# Patient Record
Sex: MALE | Race: WHITE | Employment: OTHER | URBAN - NONMETROPOLITAN AREA
[De-identification: names, ages, dates, MRNs, and addresses within clinical notes are randomized per-mention and may not be internally consistent; named-entity substitution may affect disease eponyms.]

---

## 2017-01-08 ENCOUNTER — OFFICE VISIT (OUTPATIENT)
Dept: URGENT CARE | Age: 32
End: 2017-01-08
Payer: MEDICAID

## 2017-01-08 VITALS
BODY MASS INDEX: 23.8 KG/M2 | HEART RATE: 82 BPM | SYSTOLIC BLOOD PRESSURE: 136 MMHG | TEMPERATURE: 98.9 F | DIASTOLIC BLOOD PRESSURE: 88 MMHG | WEIGHT: 78 LBS | OXYGEN SATURATION: 98 % | RESPIRATION RATE: 16 BRPM

## 2017-01-08 DIAGNOSIS — R19.5 ORANGE STOOL: ICD-10-CM

## 2017-01-08 DIAGNOSIS — R10.84 GENERALIZED ABDOMINAL CRAMPING: ICD-10-CM

## 2017-01-08 DIAGNOSIS — R19.5 STOOL MUCUS: Primary | ICD-10-CM

## 2017-01-08 PROCEDURE — 99213 OFFICE O/P EST LOW 20 MIN: CPT | Performed by: NURSE PRACTITIONER

## 2017-01-08 ASSESSMENT — ENCOUNTER SYMPTOMS
CONSTIPATION: 0
ALLERGIC/IMMUNOLOGIC NEGATIVE: 1
DIARRHEA: 0
RECTAL PAIN: 0
ABDOMINAL PAIN: 1
ANAL BLEEDING: 0
BLOOD IN STOOL: 0
EYES NEGATIVE: 1
VOMITING: 0
RESPIRATORY NEGATIVE: 1
NAUSEA: 0

## 2017-01-11 ENCOUNTER — HOSPITAL ENCOUNTER (OUTPATIENT)
Dept: LAB | Age: 32
Setting detail: SPECIMEN
Discharge: HOME | End: 2017-01-11
Payer: MEDICAID

## 2017-01-14 LAB
CULTURE, STOOL: NORMAL
E COLI SHIGA TOXIN ASSAY: NORMAL

## 2017-03-23 ENCOUNTER — OFFICE VISIT (OUTPATIENT)
Dept: PRIMARY CARE CLINIC | Age: 32
End: 2017-03-23
Payer: MEDICAID

## 2017-03-23 VITALS
SYSTOLIC BLOOD PRESSURE: 116 MMHG | DIASTOLIC BLOOD PRESSURE: 64 MMHG | HEART RATE: 100 BPM | OXYGEN SATURATION: 98 % | BODY MASS INDEX: 23.8 KG/M2 | WEIGHT: 78 LBS

## 2017-03-23 DIAGNOSIS — I10 ESSENTIAL HYPERTENSION: ICD-10-CM

## 2017-03-23 DIAGNOSIS — Q05.9 SPINA BIFIDA, UNSPECIFIED HYDROCEPHALUS PRESENCE, UNSPECIFIED SPINAL REGION (HCC): Primary | ICD-10-CM

## 2017-03-23 PROCEDURE — 99213 OFFICE O/P EST LOW 20 MIN: CPT | Performed by: FAMILY MEDICINE

## 2017-03-23 RX ORDER — POTASSIUM CITRATE 5 MEQ/1
TABLET, EXTENDED RELEASE ORAL
Qty: 180 TABLET | Refills: 0 | Status: SHIPPED | OUTPATIENT
Start: 2017-03-23 | End: 2017-05-17 | Stop reason: SDUPTHER

## 2017-03-23 ASSESSMENT — ENCOUNTER SYMPTOMS
SHORTNESS OF BREATH: 0
COUGH: 0

## 2017-04-04 ENCOUNTER — OFFICE VISIT (OUTPATIENT)
Dept: URGENT CARE | Age: 32
End: 2017-04-04
Payer: MEDICAID

## 2017-04-04 VITALS
HEART RATE: 100 BPM | WEIGHT: 78 LBS | RESPIRATION RATE: 20 BRPM | BODY MASS INDEX: 23.8 KG/M2 | DIASTOLIC BLOOD PRESSURE: 89 MMHG | OXYGEN SATURATION: 97 % | SYSTOLIC BLOOD PRESSURE: 129 MMHG | TEMPERATURE: 98.9 F

## 2017-04-04 DIAGNOSIS — M43.6 NECK STIFFNESS: ICD-10-CM

## 2017-04-04 DIAGNOSIS — T14.8XXA MUSCLE STRAIN: Primary | ICD-10-CM

## 2017-04-04 PROCEDURE — 99213 OFFICE O/P EST LOW 20 MIN: CPT | Performed by: NURSE PRACTITIONER

## 2017-04-04 RX ORDER — CYCLOBENZAPRINE HCL 5 MG
5 TABLET ORAL EVERY 8 HOURS PRN
Qty: 15 TABLET | Refills: 0 | Status: SHIPPED | OUTPATIENT
Start: 2017-04-04 | End: 2017-04-09

## 2017-04-04 RX ORDER — MELOXICAM 7.5 MG/1
7.5 TABLET ORAL DAILY
Qty: 10 TABLET | Refills: 0 | Status: SHIPPED | OUTPATIENT
Start: 2017-04-04 | End: 2017-05-23 | Stop reason: ALTCHOICE

## 2017-04-05 ENCOUNTER — TELEPHONE (OUTPATIENT)
Dept: PRIMARY CARE CLINIC | Age: 32
End: 2017-04-05

## 2017-05-03 DIAGNOSIS — R73.01 IMPAIRED FASTING GLUCOSE: Primary | ICD-10-CM

## 2017-05-05 DIAGNOSIS — R73.01 IMPAIRED FASTING GLUCOSE: ICD-10-CM

## 2017-05-05 DIAGNOSIS — I10 ESSENTIAL HYPERTENSION: ICD-10-CM

## 2017-05-05 LAB
ANION GAP SERPL CALCULATED.3IONS-SCNC: 19 MMOL/L (ref 7–19)
BUN BLDV-MCNC: 15 MG/DL (ref 6–20)
CALCIUM SERPL-MCNC: 9.7 MG/DL (ref 8.6–10)
CHLORIDE BLD-SCNC: 96 MMOL/L (ref 98–111)
CHOLESTEROL, TOTAL: 168 MG/DL (ref 160–199)
CO2: 24 MMOL/L (ref 22–29)
CREAT SERPL-MCNC: 0.5 MG/DL (ref 0.5–1.2)
GFR NON-AFRICAN AMERICAN: >60
GLUCOSE BLD-MCNC: 76 MG/DL (ref 74–109)
HBA1C MFR BLD: 5.7 %
HDLC SERPL-MCNC: 43 MG/DL (ref 55–121)
LDL CHOLESTEROL CALCULATED: 113 MG/DL
POTASSIUM SERPL-SCNC: 4.6 MMOL/L (ref 3.5–5)
SODIUM BLD-SCNC: 139 MMOL/L (ref 136–145)
TRIGL SERPL-MCNC: 59 MG/DL (ref 150–199)

## 2017-05-08 ENCOUNTER — TELEPHONE (OUTPATIENT)
Dept: PRIMARY CARE CLINIC | Age: 32
End: 2017-05-08

## 2017-05-15 ENCOUNTER — HOSPITAL ENCOUNTER (OUTPATIENT)
Dept: WOUND CARE | Age: 32
Discharge: HOME | End: 2017-05-15
Payer: MEDICAID

## 2017-05-15 VITALS
HEART RATE: 100 BPM | DIASTOLIC BLOOD PRESSURE: 75 MMHG | TEMPERATURE: 98.6 F | SYSTOLIC BLOOD PRESSURE: 107 MMHG | RESPIRATION RATE: 16 BRPM

## 2017-05-15 PROCEDURE — 99212 OFFICE O/P EST SF 10 MIN: CPT | Performed by: NURSE PRACTITIONER

## 2017-05-15 PROCEDURE — 99213 OFFICE O/P EST LOW 20 MIN: CPT

## 2017-05-17 DIAGNOSIS — I10 ESSENTIAL HYPERTENSION: ICD-10-CM

## 2017-05-19 ENCOUNTER — HOSPITAL ENCOUNTER (EMERGENCY)
Age: 32
Discharge: HOME | End: 2017-05-19
Attending: EMERGENCY MEDICINE
Payer: MEDICAID

## 2017-05-19 ENCOUNTER — HOSPITAL ENCOUNTER (OUTPATIENT)
Age: 32
Setting detail: SPECIMEN
Discharge: HOME | End: 2017-05-19
Payer: MEDICAID

## 2017-05-19 VITALS
OXYGEN SATURATION: 97 % | DIASTOLIC BLOOD PRESSURE: 91 MMHG | RESPIRATION RATE: 16 BRPM | WEIGHT: 78 LBS | BODY MASS INDEX: 23.8 KG/M2 | SYSTOLIC BLOOD PRESSURE: 126 MMHG | HEART RATE: 101 BPM | TEMPERATURE: 98.1 F

## 2017-05-19 DIAGNOSIS — Q64.70 ABNORMALITY OF URETHRAL MEATUS: Primary | ICD-10-CM

## 2017-05-19 PROCEDURE — 99281 EMR DPT VST MAYX REQ PHY/QHP: CPT | Performed by: EMERGENCY MEDICINE

## 2017-05-19 PROCEDURE — 99282 EMERGENCY DEPT VISIT SF MDM: CPT

## 2017-05-19 RX ORDER — POTASSIUM CITRATE 5 MEQ/1
TABLET, EXTENDED RELEASE ORAL
Qty: 180 TABLET | Refills: 5 | Status: SHIPPED | OUTPATIENT
Start: 2017-05-19 | End: 2017-09-21 | Stop reason: SDUPTHER

## 2017-05-19 ASSESSMENT — ENCOUNTER SYMPTOMS
ABDOMINAL PAIN: 0
NAUSEA: 0
VOMITING: 0

## 2017-05-21 LAB — URINE CULTURE, ROUTINE: NORMAL

## 2017-05-23 ENCOUNTER — HOSPITAL ENCOUNTER (OUTPATIENT)
Dept: WOUND CARE | Age: 32
Discharge: HOME | End: 2017-05-23
Payer: MEDICAID

## 2017-05-23 VITALS
TEMPERATURE: 98.4 F | HEART RATE: 126 BPM | RESPIRATION RATE: 16 BRPM | SYSTOLIC BLOOD PRESSURE: 124 MMHG | DIASTOLIC BLOOD PRESSURE: 84 MMHG | BODY MASS INDEX: 15.31 KG/M2 | WEIGHT: 78 LBS | HEIGHT: 60 IN

## 2017-05-23 PROCEDURE — 99213 OFFICE O/P EST LOW 20 MIN: CPT

## 2017-05-23 PROCEDURE — 99213 OFFICE O/P EST LOW 20 MIN: CPT | Performed by: SURGERY

## 2017-05-25 ENCOUNTER — OFFICE VISIT (OUTPATIENT)
Dept: UROLOGY | Age: 32
End: 2017-05-25
Payer: MEDICAID

## 2017-05-25 VITALS
TEMPERATURE: 98.5 F | OXYGEN SATURATION: 97 % | HEART RATE: 93 BPM | SYSTOLIC BLOOD PRESSURE: 100 MMHG | WEIGHT: 75 LBS | BODY MASS INDEX: 22.89 KG/M2 | DIASTOLIC BLOOD PRESSURE: 60 MMHG

## 2017-05-25 DIAGNOSIS — N31.9 NEUROGENIC BLADDER: ICD-10-CM

## 2017-05-25 DIAGNOSIS — N48.5 ULCER OF PENIS: Primary | ICD-10-CM

## 2017-05-25 PROCEDURE — 99214 OFFICE O/P EST MOD 30 MIN: CPT | Performed by: PHYSICIAN ASSISTANT

## 2017-05-25 ASSESSMENT — ENCOUNTER SYMPTOMS
BLURRED VISION: 0
HEMOPTYSIS: 0
DOUBLE VISION: 0
NAUSEA: 0
COUGH: 0
VOMITING: 0
ABDOMINAL PAIN: 0
BACK PAIN: 0

## 2017-06-01 ENCOUNTER — HOSPITAL ENCOUNTER (OUTPATIENT)
Dept: WOUND CARE | Age: 32
Discharge: HOME | End: 2017-06-01
Payer: MEDICAID

## 2017-06-01 VITALS
HEIGHT: 60 IN | DIASTOLIC BLOOD PRESSURE: 66 MMHG | SYSTOLIC BLOOD PRESSURE: 96 MMHG | RESPIRATION RATE: 16 BRPM | WEIGHT: 77 LBS | HEART RATE: 82 BPM | TEMPERATURE: 98.8 F | BODY MASS INDEX: 15.12 KG/M2

## 2017-06-01 PROCEDURE — 99213 OFFICE O/P EST LOW 20 MIN: CPT

## 2017-06-01 PROCEDURE — 99213 OFFICE O/P EST LOW 20 MIN: CPT | Performed by: SURGERY

## 2017-06-23 ENCOUNTER — OFFICE VISIT (OUTPATIENT)
Dept: URGENT CARE | Age: 32
End: 2017-06-23
Payer: MEDICAID

## 2017-06-23 VITALS
WEIGHT: 77 LBS | HEART RATE: 102 BPM | RESPIRATION RATE: 20 BRPM | BODY MASS INDEX: 23.5 KG/M2 | OXYGEN SATURATION: 96 % | SYSTOLIC BLOOD PRESSURE: 130 MMHG | DIASTOLIC BLOOD PRESSURE: 94 MMHG | TEMPERATURE: 99.3 F

## 2017-06-23 DIAGNOSIS — J02.0 STREP PHARYNGITIS: Primary | ICD-10-CM

## 2017-06-23 DIAGNOSIS — J02.9 SORE THROAT: ICD-10-CM

## 2017-06-23 LAB — S PYO AG THROAT QL: POSITIVE

## 2017-06-23 PROCEDURE — 99213 OFFICE O/P EST LOW 20 MIN: CPT | Performed by: PHYSICIAN ASSISTANT

## 2017-06-23 PROCEDURE — 87880 STREP A ASSAY W/OPTIC: CPT | Performed by: PHYSICIAN ASSISTANT

## 2017-06-23 RX ORDER — AMOXICILLIN 400 MG/5ML
800 POWDER, FOR SUSPENSION ORAL 2 TIMES DAILY
Qty: 200 ML | Refills: 0 | Status: SHIPPED | OUTPATIENT
Start: 2017-06-23 | End: 2017-07-03

## 2017-06-23 ASSESSMENT — ENCOUNTER SYMPTOMS
RHINORRHEA: 1
COUGH: 1
NAUSEA: 0
DIARRHEA: 0
VOMITING: 0
ABDOMINAL PAIN: 0
SORE THROAT: 1

## 2017-07-10 DIAGNOSIS — Q05.9 SPINA BIFIDA, UNSPECIFIED HYDROCEPHALUS PRESENCE, UNSPECIFIED SPINAL REGION (HCC): ICD-10-CM

## 2017-07-13 DIAGNOSIS — Q05.9 SPINA BIFIDA, UNSPECIFIED HYDROCEPHALUS PRESENCE, UNSPECIFIED SPINAL REGION (HCC): ICD-10-CM

## 2017-09-08 DIAGNOSIS — Q05.9 SPINA BIFIDA, UNSPECIFIED HYDROCEPHALUS PRESENCE, UNSPECIFIED SPINAL REGION (HCC): ICD-10-CM

## 2017-09-19 ENCOUNTER — TELEPHONE (OUTPATIENT)
Dept: PRIMARY CARE CLINIC | Age: 32
End: 2017-09-19

## 2017-09-21 ENCOUNTER — OFFICE VISIT (OUTPATIENT)
Dept: PRIMARY CARE CLINIC | Age: 32
End: 2017-09-21
Payer: MEDICAID

## 2017-09-21 VITALS
OXYGEN SATURATION: 97 % | DIASTOLIC BLOOD PRESSURE: 68 MMHG | SYSTOLIC BLOOD PRESSURE: 116 MMHG | HEIGHT: 60 IN | WEIGHT: 78 LBS | BODY MASS INDEX: 15.31 KG/M2 | TEMPERATURE: 98 F | HEART RATE: 103 BPM

## 2017-09-21 DIAGNOSIS — F41.0 PANIC ATTACKS: ICD-10-CM

## 2017-09-21 DIAGNOSIS — I10 ESSENTIAL HYPERTENSION: Primary | ICD-10-CM

## 2017-09-21 DIAGNOSIS — Q05.9 SPINA BIFIDA, UNSPECIFIED HYDROCEPHALUS PRESENCE, UNSPECIFIED SPINAL REGION (HCC): ICD-10-CM

## 2017-09-21 PROCEDURE — 99213 OFFICE O/P EST LOW 20 MIN: CPT | Performed by: FAMILY MEDICINE

## 2017-09-21 RX ORDER — LISINOPRIL 5 MG/1
TABLET ORAL
Qty: 30 TABLET | Refills: 11 | Status: SHIPPED | OUTPATIENT
Start: 2017-09-21 | End: 2018-09-25 | Stop reason: SDUPTHER

## 2017-09-21 RX ORDER — POTASSIUM CITRATE 5 MEQ/1
TABLET, EXTENDED RELEASE ORAL
Qty: 180 TABLET | Refills: 5 | Status: SHIPPED | OUTPATIENT
Start: 2017-09-21 | End: 2018-10-19 | Stop reason: SDUPTHER

## 2017-09-21 ASSESSMENT — ENCOUNTER SYMPTOMS
COUGH: 0
SHORTNESS OF BREATH: 0

## 2017-09-25 ENCOUNTER — TELEPHONE (OUTPATIENT)
Dept: URGENT CARE | Age: 32
End: 2017-09-25

## 2017-09-25 ENCOUNTER — OFFICE VISIT (OUTPATIENT)
Dept: URGENT CARE | Age: 32
End: 2017-09-25
Payer: MEDICAID

## 2017-09-25 VITALS
BODY MASS INDEX: 23.8 KG/M2 | DIASTOLIC BLOOD PRESSURE: 85 MMHG | HEART RATE: 109 BPM | SYSTOLIC BLOOD PRESSURE: 120 MMHG | TEMPERATURE: 98.9 F | RESPIRATION RATE: 18 BRPM | WEIGHT: 78 LBS | OXYGEN SATURATION: 97 %

## 2017-09-25 DIAGNOSIS — R10.84 GENERALIZED ABDOMINAL PAIN: ICD-10-CM

## 2017-09-25 DIAGNOSIS — R82.90 CLOUDY URINE: ICD-10-CM

## 2017-09-25 DIAGNOSIS — N30.01 ACUTE CYSTITIS WITH HEMATURIA: Primary | ICD-10-CM

## 2017-09-25 DIAGNOSIS — R31.9 HEMATURIA: ICD-10-CM

## 2017-09-25 DIAGNOSIS — R10.9 RIGHT FLANK PAIN: ICD-10-CM

## 2017-09-25 LAB
APPEARANCE FLUID: CLEAR
BILIRUBIN, POC: ABNORMAL
BLOOD URINE, POC: ABNORMAL
CLARITY, POC: CLEAR
COLOR, POC: YELLOW
GLUCOSE URINE, POC: ABNORMAL
KETONES, POC: ABNORMAL
LEUKOCYTE EST, POC: ABNORMAL
NITRITE, POC: ABNORMAL
PH, POC: 7
PROTEIN, POC: 100
SPECIFIC GRAVITY, POC: 1.01
UROBILINOGEN, POC: 2

## 2017-09-25 PROCEDURE — 99213 OFFICE O/P EST LOW 20 MIN: CPT | Performed by: NURSE PRACTITIONER

## 2017-09-25 RX ORDER — SULFAMETHOXAZOLE AND TRIMETHOPRIM 200; 40 MG/5ML; MG/5ML
160 SUSPENSION ORAL 2 TIMES DAILY
Qty: 400 ML | Refills: 0 | Status: SHIPPED | OUTPATIENT
Start: 2017-09-25 | End: 2018-07-26 | Stop reason: SDUPTHER

## 2017-09-25 ASSESSMENT — ENCOUNTER SYMPTOMS
VOMITING: 0
DIARRHEA: 0
ABDOMINAL PAIN: 1
NAUSEA: 1
RESPIRATORY NEGATIVE: 1

## 2017-09-26 ENCOUNTER — TELEPHONE (OUTPATIENT)
Dept: UROLOGY | Age: 32
End: 2017-09-26

## 2017-09-26 ENCOUNTER — HOSPITAL ENCOUNTER (OUTPATIENT)
Dept: CT IMAGING | Age: 32
Discharge: HOME | End: 2017-09-26
Payer: MEDICAID

## 2017-09-26 DIAGNOSIS — R10.9 RIGHT FLANK PAIN: ICD-10-CM

## 2017-09-26 DIAGNOSIS — N20.0 KIDNEY STONE: Primary | ICD-10-CM

## 2017-09-26 DIAGNOSIS — R31.9 HEMATURIA: ICD-10-CM

## 2017-09-26 PROCEDURE — 74176 CT ABD & PELVIS W/O CONTRAST: CPT

## 2017-09-27 LAB
ORGANISM: ABNORMAL
URINE CULTURE, ROUTINE: ABNORMAL
URINE CULTURE, ROUTINE: ABNORMAL

## 2017-12-28 ENCOUNTER — OFFICE VISIT (OUTPATIENT)
Dept: URGENT CARE | Age: 32
End: 2017-12-28
Payer: MEDICAID

## 2017-12-28 VITALS
RESPIRATION RATE: 18 BRPM | OXYGEN SATURATION: 97 % | WEIGHT: 74 LBS | BODY MASS INDEX: 14.53 KG/M2 | SYSTOLIC BLOOD PRESSURE: 135 MMHG | HEIGHT: 60 IN | TEMPERATURE: 99.1 F | HEART RATE: 111 BPM | DIASTOLIC BLOOD PRESSURE: 95 MMHG

## 2017-12-28 DIAGNOSIS — J02.9 SORE THROAT: Primary | ICD-10-CM

## 2017-12-28 LAB — S PYO AG THROAT QL: NORMAL

## 2017-12-28 PROCEDURE — 87880 STREP A ASSAY W/OPTIC: CPT | Performed by: NURSE PRACTITIONER

## 2017-12-28 PROCEDURE — 99213 OFFICE O/P EST LOW 20 MIN: CPT | Performed by: NURSE PRACTITIONER

## 2017-12-28 ASSESSMENT — ENCOUNTER SYMPTOMS
SINUS PRESSURE: 0
ALLERGIC/IMMUNOLOGIC NEGATIVE: 1
GASTROINTESTINAL NEGATIVE: 1
VOICE CHANGE: 0
RHINORRHEA: 0
EYES NEGATIVE: 1
TROUBLE SWALLOWING: 0
SORE THROAT: 1
COUGH: 0
SHORTNESS OF BREATH: 0

## 2017-12-29 NOTE — PROGRESS NOTES
[facility] E CARE  [facility]  [address] [place] [id number]  Dept: [phone]  Loc: [phone]    Apurva Becerril is a 28 y.o. male who presents today for his medical conditions/complaints as noted below. Apurva Becerril is c/o of Pharyngitis and Fatigue        HPI:     HPIPt presents to clinic with c/o dry scratchy throat that started yesterday. Denies fever, SOB, chills, n/v/d or any other symptoms. Mom states that pt's BP has been running a little high and it happens when he gets nervous and stressed. States he is on 5 mg BP med. States he has appt with PCP in March. Pt presents to clinic in wheelchair. Results for orders placed or performed in visit on 12/28/17   POCT rapid strep A   Result Value Ref Range    Strep A Ag None Detected None Detected       POCT strep is negative. No results found for this visit on 12/28/17. Past Medical History:   Diagnosis Date    Anxiety     Chronic kidney disease     kidney stones started at age 5, treated at [facility].   Has had lithotripsy and surgery to remove stones    Club Foot     Congenital paraplegia ([address]     Hypertension     diagnosed at age 6    Leg fracture, left     while in body cast due to swelling    Myelomeningocele with hydrocephalus ([address] 1985    Open spina bifida at birth   [facility] Neuropathy ([address]     Pressure sore on ankle     and knee     Scoliosis     Spastic neurogenic bladder     cather x 5 a day      (ventriculoperitoneal) shunt status       Past Surgical History:   Procedure Laterality Date    HERNIA REPAIR      groin    HIP SURGERY Left     LITHOTRIPSY      VENTRICULOPERITONEAL SHUNT      with revisions        Family History   Problem Relation Age of Onset    Colon Cancer Mother 54     In remission    High Cholesterol Mother     High Blood Pressure Mother     Thyroid Disease Mother      Hypothyroidism    Coronary Art Dis Maternal Grandmother CABG x 2    Heart Attack Maternal Grandmother     High Blood Pressure Maternal Grandmother     Cancer Maternal Grandmother      Pancreatic Cancer    Heart Failure Maternal Grandfather       in     Immunodeficiency Neg Hx        Social History   Substance Use Topics    Smoking status: Never Smoker    Smokeless tobacco: Never Used    Alcohol use No      Current Outpatient Prescriptions   Medication Sig Dispense Refill    menthol-zinc oxide (CALMOSEPTINE) 0.44-20.6 % OINT ointment Apply topically daily Max 30 ml per day.  potassium citrate (UROCIT-K) 5 MEQ (540 MG) extended release tablet Take 2 tablets 3 times a day with meals 180 tablet 5    lisinopril (PRINIVIL;ZESTRIL) 5 MG tablet TAKE (1) TABLET DAILY FOR BLOOD PRESSURE. 30 tablet 11    Catheters MISC CATHETER 5 TIMES DAILY IN & OUT *Q05.9* 150 each 5    ALPRAZolam (XANAX) 0.5 MG tablet Take 0.5 mg by mouth 2 times daily as needed for Sleep       Blood Pressure Monitoring [address] Disabled patient who doesn't drive needs BP monitor for home for parents to check it for him 1 Device 0    aspirin 81 MG chewable tablet Take 81 mg by mouth daily. No current facility-administered medications for this visit. Allergies   Allergen Reactions    Latex Swelling and Rash     Had reaction at dentist office-redness, swelling, and rash    Ceftriaxone Other (See Comments)     Fever, hallucinations was transported via ambulance [place]     Vancomycin Other (See Comments)     Red warm hands and itching       Health Maintenance   Topic Date Due    Flu vaccine (1) 2017    DTaP/Tdap/Td vaccine (2 - Td) 2022    HIV screen  Addressed       Subjective:      Review of Systems   Constitutional: Negative. HENT: Positive for sore throat. Negative for ear pain, rhinorrhea, sinus pressure, sneezing, trouble swallowing and voice change. Eyes: Negative. Respiratory: Negative for cough and shortness of breath.     Cardiovascular: Negative. Gastrointestinal: Negative. Endocrine: Negative. Genitourinary: Negative. Musculoskeletal: Negative. Skin: Negative. Negative for rash. Allergic/Immunologic: Negative. Neurological: Negative. Hematological: Negative. Psychiatric/Behavioral: Negative. Objective:     Physical Exam   Constitutional: He is oriented to person, place, and time. Vital signs are normal. He appears well-developed and well-nourished. Non-toxic appearance. He does not have a sickly appearance. He does not appear ill. No distress. HENT:   Head: Normocephalic. Right Ear: Hearing, tympanic membrane, external ear and ear canal normal.   Left Ear: Hearing, tympanic membrane, external ear and ear canal normal.   Nose: Nose normal. Right sinus exhibits no maxillary sinus tenderness and no frontal sinus tenderness. Left sinus exhibits no maxillary sinus tenderness and no frontal sinus tenderness. Mouth/Throat: Uvula is midline, oropharynx is clear and moist and mucous membranes are normal. No posterior oropharyngeal erythema. Eyes: Conjunctivae are normal.   Neck: Normal range of motion. Cardiovascular: Normal rate and regular rhythm. Pulmonary/Chest: Effort normal and breath sounds normal. He has no decreased breath sounds. Abdominal: Soft. Bowel sounds are normal.   Lymphadenopathy:        Head (right side): No submental, no submandibular, no tonsillar, no preauricular, no posterior auricular and no occipital adenopathy present. Head (left side): No submental, no submandibular, no tonsillar, no preauricular, no posterior auricular and no occipital adenopathy present. He has no cervical adenopathy. Neurological: He is alert and oriented to person, place, and time. Skin: Skin is warm and intact. No rash noted. Psychiatric: He has a normal mood and affect.  His speech is normal and behavior is normal. Judgment and thought content normal. Cognition and memory are normal. Nursing note and vitals reviewed. BP (!) 135/95   Pulse 111   Temp 99.1 °F (37.3 °C) (Oral)   Resp 18   Ht 4' (1.219 m)   Wt 74 lb (33.6 kg)   SpO2 97%   BMI 22.58 kg/m²     Assessment:      1. Sore throat  POCT rapid strep A       Plan:      Orders Placed This Encounter   Procedures    POCT rapid strep A       Return if symptoms worsen or fail to improve. Orders Placed This Encounter   Procedures    POCT rapid strep A     No orders of the defined types were placed in this encounter. Patient given educational materials - see patient instructions. Discussed use, benefit, and side effects of prescribed medications. All patient questions answered. Pt voiced understanding. Reviewed health maintenance. Instructed to continue current medications, diet and exercise. Patient agreed with treatment plan. Follow up as directed. Patient Instructions   1. Gargle 4 times a day with salt water  2. Monitor BP and follow up with Dr Mohamud Hanson if consistently high  3. Check BP in relaxed environment at home and  Go to ER if BP on bottom over 100  4.  Return to clinic if symptoms worsen or fail to improve        Electronically signed by Lisa Correa CNP on 12/28/2017 at 6:54 PM

## 2017-12-29 NOTE — PATIENT INSTRUCTIONS
1. Gargle 4 times a day with salt water  2. Monitor BP and follow up with Dr Jeet Bustillos if consistently high  3. Check BP in relaxed environment at home and  Go to ER if BP on bottom over 100  4.  Return to clinic if symptoms worsen or fail to improve

## 2018-01-09 ENCOUNTER — OFFICE VISIT (OUTPATIENT)
Dept: PRIMARY CARE CLINIC | Age: 33
End: 2018-01-09
Payer: MEDICAID

## 2018-01-09 VITALS
TEMPERATURE: 97.6 F | WEIGHT: 74 LBS | BODY MASS INDEX: 14.53 KG/M2 | HEART RATE: 138 BPM | DIASTOLIC BLOOD PRESSURE: 100 MMHG | SYSTOLIC BLOOD PRESSURE: 152 MMHG | HEIGHT: 60 IN | OXYGEN SATURATION: 97 %

## 2018-01-09 DIAGNOSIS — G83.9 PARALYSIS (HCC): ICD-10-CM

## 2018-01-09 DIAGNOSIS — F41.9 ANXIETY: Primary | ICD-10-CM

## 2018-01-09 PROCEDURE — 99213 OFFICE O/P EST LOW 20 MIN: CPT | Performed by: NURSE PRACTITIONER

## 2018-01-09 RX ORDER — BUSPIRONE HYDROCHLORIDE 7.5 MG/1
7.5 TABLET ORAL 2 TIMES DAILY
Qty: 60 TABLET | Refills: 1 | Status: SHIPPED | OUTPATIENT
Start: 2018-01-09 | End: 2018-02-12 | Stop reason: DRUGHIGH

## 2018-01-09 ASSESSMENT — ENCOUNTER SYMPTOMS
RESPIRATORY NEGATIVE: 1
GASTROINTESTINAL NEGATIVE: 1

## 2018-01-09 NOTE — PROGRESS NOTES
agreed with treatment plan. Follow up as directed.            Electronically signed by DEVON Dalal on 1/9/2018 at 2:41 PM

## 2018-02-08 ENCOUNTER — TELEPHONE (OUTPATIENT)
Dept: PRIMARY CARE CLINIC | Age: 33
End: 2018-02-08

## 2018-02-08 NOTE — TELEPHONE ENCOUNTER
Patients mom called regarding form that has to be filled out every 6 months that was faxed from [employer] for [employer] up and chucks

## 2018-02-12 ENCOUNTER — OFFICE VISIT (OUTPATIENT)
Dept: PRIMARY CARE CLINIC | Age: 33
End: 2018-02-12
Payer: MEDICAID

## 2018-02-12 VITALS
WEIGHT: 74 LBS | HEART RATE: 101 BPM | HEIGHT: 60 IN | DIASTOLIC BLOOD PRESSURE: 80 MMHG | SYSTOLIC BLOOD PRESSURE: 124 MMHG | TEMPERATURE: 99.1 F | OXYGEN SATURATION: 98 % | BODY MASS INDEX: 14.53 KG/M2

## 2018-02-12 DIAGNOSIS — F41.9 ANXIETY: ICD-10-CM

## 2018-02-12 DIAGNOSIS — I10 ESSENTIAL HYPERTENSION: Primary | ICD-10-CM

## 2018-02-12 DIAGNOSIS — Q05.9 SPINA BIFIDA, UNSPECIFIED HYDROCEPHALUS PRESENCE, UNSPECIFIED SPINAL REGION (HCC): ICD-10-CM

## 2018-02-12 PROCEDURE — 99213 OFFICE O/P EST LOW 20 MIN: CPT | Performed by: FAMILY MEDICINE

## 2018-02-12 RX ORDER — BUSPIRONE HYDROCHLORIDE 5 MG/1
5 TABLET ORAL 2 TIMES DAILY PRN
Qty: 60 TABLET | Refills: 2 | Status: SHIPPED | OUTPATIENT
Start: 2018-02-12 | End: 2018-11-08 | Stop reason: SDUPTHER

## 2018-02-12 ASSESSMENT — ENCOUNTER SYMPTOMS
COUGH: 0
SHORTNESS OF BREATH: 0

## 2018-02-12 ASSESSMENT — PATIENT HEALTH QUESTIONNAIRE - PHQ9
SUM OF ALL RESPONSES TO PHQ9 QUESTIONS 1 & 2: 0
1. LITTLE INTEREST OR PLEASURE IN DOING THINGS: 0
SUM OF ALL RESPONSES TO PHQ QUESTIONS 1-9: 0
2. FEELING DOWN, DEPRESSED OR HOPELESS: 0

## 2018-02-12 NOTE — PROGRESS NOTES
heart sounds. No murmur heard. Decreased pulses on right. Cold, erythematous right lower extremity. Pulmonary/Chest: No accessory muscle usage. No respiratory distress. He has no wheezes. He has no rales. Abdominal: He exhibits no distension and no mass. There is no tenderness. Musculoskeletal: He exhibits no edema. Paraplegic  Wheelchair bound   Neurological: He is alert and oriented to person, place, and time. Skin: He is not diaphoretic. Nursing note and vitals reviewed. Assessment    ICD-10-CM ICD-9-CM    1. Essential hypertension I10 401.9 The current medical regimen is effective;  continue present plan and medications. Comprehensive Metabolic Panel      Lipid panel   2. Spina bifida, unspecified hydrocephalus presence, unspecified spinal region ([facility] Q05.9 741.90    3. Anxiety F41.9 300.00 Decrease Buspar to 5 mg po prn anxiety. Plan    Orders Placed This Encounter   Medications    busPIRone (BUSPAR) 5 MG tablet     Sig: Take 1 tablet by mouth 2 times daily as needed (anxiety)     Dispense:  60 tablet     Refill:  2       Orders Placed This Encounter   Procedures    Comprehensive Metabolic Panel    Lipid panel        Return in about 3 months (around 5/12/2018) for htn, anxiety. There are no Patient Instructions on file for this visit.

## 2018-02-18 ENCOUNTER — OFFICE VISIT (OUTPATIENT)
Dept: URGENT CARE | Age: 33
End: 2018-02-18
Payer: MEDICAID

## 2018-02-18 VITALS
HEART RATE: 134 BPM | DIASTOLIC BLOOD PRESSURE: 86 MMHG | OXYGEN SATURATION: 98 % | BODY MASS INDEX: 22.58 KG/M2 | WEIGHT: 74 LBS | RESPIRATION RATE: 18 BRPM | SYSTOLIC BLOOD PRESSURE: 136 MMHG | TEMPERATURE: 98.5 F

## 2018-02-18 DIAGNOSIS — J02.9 ACUTE PHARYNGITIS, UNSPECIFIED ETIOLOGY: ICD-10-CM

## 2018-02-18 DIAGNOSIS — H10.32 ACUTE CONJUNCTIVITIS OF LEFT EYE, UNSPECIFIED ACUTE CONJUNCTIVITIS TYPE: ICD-10-CM

## 2018-02-18 DIAGNOSIS — J02.9 SORE THROAT: Primary | ICD-10-CM

## 2018-02-18 DIAGNOSIS — J01.90 ACUTE SINUSITIS, RECURRENCE NOT SPECIFIED, UNSPECIFIED LOCATION: ICD-10-CM

## 2018-02-18 LAB
INFLUENZA A ANTIBODY: NEGATIVE
INFLUENZA B ANTIBODY: NEGATIVE
S PYO AG THROAT QL: NORMAL

## 2018-02-18 PROCEDURE — 87804 INFLUENZA ASSAY W/OPTIC: CPT | Performed by: SPECIALIST

## 2018-02-18 PROCEDURE — 87880 STREP A ASSAY W/OPTIC: CPT | Performed by: SPECIALIST

## 2018-02-18 PROCEDURE — 99213 OFFICE O/P EST LOW 20 MIN: CPT | Performed by: SPECIALIST

## 2018-02-18 RX ORDER — AZITHROMYCIN 200 MG/5ML
10 POWDER, FOR SUSPENSION ORAL DAILY
Qty: 42 ML | Refills: 0 | Status: SHIPPED | OUTPATIENT
Start: 2018-02-18 | End: 2018-02-23

## 2018-02-18 RX ORDER — TOBRAMYCIN 3 MG/ML
2 SOLUTION/ DROPS OPHTHALMIC
Qty: 1 BOTTLE | Refills: 0 | Status: SHIPPED | OUTPATIENT
Start: 2018-02-18 | End: 2018-02-25

## 2018-02-18 ASSESSMENT — ENCOUNTER SYMPTOMS
RHINORRHEA: 1
COUGH: 1
SORE THROAT: 1

## 2018-02-18 NOTE — PROGRESS NOTES
[facility] [facility] E CARE  [address] [place] [address] Chito Gutiérrez [id number]  Dept: [phone]  Loc: [phone]    Rusty Adair is a 28 y.o. male who presents today for his medical conditions/complaints as noted below. Rusty Adair is c/o of Cough; Pharyngitis; Congestion; and Eye Problem (REDNESS IN LEFT EYE)        HPI:     Cough   This is a new problem. The current episode started yesterday. The problem has been gradually worsening. The cough is productive of sputum. Associated symptoms include headaches, rhinorrhea and a sore throat. Associated symptoms comments: Left eye drainage. Pharyngitis   Associated symptoms include congestion, coughing, headaches and a sore throat. Past Medical History:   Diagnosis Date    Anxiety     Chronic kidney disease     kidney stones started at age 5, treated at [facility].   Has had lithotripsy and surgery to remove stones    Club Foot     Congenital paraplegia ([address]     Hypertension     diagnosed at age 6    Leg fracture, left     while in body cast due to swelling    Myelomeningocele with hydrocephalus ([address] 1985    Open spina bifida at birth   [facility] Neuropathy ([address]     Pressure sore on ankle     and knee     Scoliosis     Spastic neurogenic bladder     cather x 5 a day      (ventriculoperitoneal) shunt status       Past Surgical History:   Procedure Laterality Date    HERNIA REPAIR      groin    HIP SURGERY Left     LITHOTRIPSY      VENTRICULOPERITONEAL SHUNT      with revisions        Family History   Problem Relation Age of Onset    Colon Cancer Mother 54     In remission    High Cholesterol Mother     High Blood Pressure Mother     Thyroid Disease Mother      Hypothyroidism    Coronary Art Dis Maternal Grandmother      CABG x 2    Heart Attack Maternal Grandmother     High Blood Pressure Maternal Grandmother     Cancer Maternal Grandmother      Pancreatic Cancer    Heart Failure Maternal Grandfather       in     Immunodeficiency Neg Hx        Social History   Substance Use Topics    Smoking status: Never Smoker    Smokeless tobacco: Never Used    Alcohol use No      Current Outpatient Prescriptions   Medication Sig Dispense Refill    tobramycin (TOBREX) 0.3 % ophthalmic solution Place 2 drops into the left eye every 4 hours (while awake) for 7 days 1 Bottle 0    azithromycin (ZITHROMAX) 200 MG/5ML suspension Take 8.4 mLs by mouth daily for 5 days Take by mouth daily. (10mg/kg day one and 5mg/kg day two through five 42 mL 0    busPIRone (BUSPAR) 5 MG tablet Take 1 tablet by mouth 2 times daily as needed (anxiety) 60 tablet 2    menthol-zinc oxide (CALMOSEPTINE) 0.44-20.6 % OINT ointment Apply topically daily Max 30 ml per day.  potassium citrate (UROCIT-K) 5 MEQ (540 MG) extended release tablet Take 2 tablets 3 times a day with meals 180 tablet 5    lisinopril (PRINIVIL;ZESTRIL) 5 MG tablet TAKE (1) TABLET DAILY FOR BLOOD PRESSURE. 30 tablet 11    Catheters MISC CATHETER 5 TIMES DAILY IN & OUT *Q05.9* 150 each 5    ALPRAZolam (XANAX) 0.5 MG tablet Take 0.5 mg by mouth 2 times daily as needed for Sleep       Blood Pressure Monitoring [address] Disabled patient who doesn't drive needs BP monitor for home for parents to check it for him 1 Device 0    aspirin 81 MG chewable tablet Take 81 mg by mouth daily. No current facility-administered medications for this visit.       Allergies   Allergen Reactions    Latex Swelling and Rash     Had reaction at dentist office-redness, swelling, and rash    Ceftriaxone Other (See Comments)     Fever, hallucinations was transported via ambulance [place]     Vancomycin Other (See Comments)     Red warm hands and itching       Health Maintenance   Topic Date Due    Flu vaccine (1) 2017    A1C test (Diabetic or Prediabetic)  2018    Potassium monitoring  2018    Creatinine monitoring  2018   

## 2018-02-23 DIAGNOSIS — Q05.9 SPINA BIFIDA, UNSPECIFIED HYDROCEPHALUS PRESENCE, UNSPECIFIED SPINAL REGION (HCC): ICD-10-CM

## 2018-03-16 DIAGNOSIS — Q05.9 SPINA BIFIDA, UNSPECIFIED HYDROCEPHALUS PRESENCE, UNSPECIFIED SPINAL REGION (HCC): ICD-10-CM

## 2018-03-19 DIAGNOSIS — I10 ESSENTIAL HYPERTENSION: ICD-10-CM

## 2018-03-19 LAB
ALBUMIN SERPL-MCNC: 4.5 G/DL (ref 3.5–5.2)
ALP BLD-CCNC: 86 U/L (ref 40–130)
ALT SERPL-CCNC: 17 U/L (ref 5–41)
ANION GAP SERPL CALCULATED.3IONS-SCNC: 12 MMOL/L (ref 7–19)
AST SERPL-CCNC: 22 U/L (ref 5–40)
BILIRUB SERPL-MCNC: 0.7 MG/DL (ref 0.2–1.2)
BUN BLDV-MCNC: 14 MG/DL (ref 6–20)
CALCIUM SERPL-MCNC: 9.4 MG/DL (ref 8.6–10)
CHLORIDE BLD-SCNC: 102 MMOL/L (ref 98–111)
CHOLESTEROL, TOTAL: 179 MG/DL (ref 160–199)
CO2: 29 MMOL/L (ref 22–29)
CREAT SERPL-MCNC: <0.5 MG/DL (ref 0.5–1.2)
GFR NON-AFRICAN AMERICAN: >60
GLUCOSE BLD-MCNC: 87 MG/DL (ref 74–109)
HDLC SERPL-MCNC: 52 MG/DL (ref 55–121)
LDL CHOLESTEROL CALCULATED: 117 MG/DL
POTASSIUM SERPL-SCNC: 4.8 MMOL/L (ref 3.5–5)
SODIUM BLD-SCNC: 143 MMOL/L (ref 136–145)
TOTAL PROTEIN: 7.1 G/DL (ref 6.6–8.7)
TRIGL SERPL-MCNC: 49 MG/DL (ref 0–149)

## 2018-03-20 ENCOUNTER — TELEPHONE (OUTPATIENT)
Dept: PRIMARY CARE CLINIC | Age: 33
End: 2018-03-20

## 2018-05-01 ENCOUNTER — TELEPHONE (OUTPATIENT)
Dept: PRIMARY CARE CLINIC | Age: 33
End: 2018-05-01

## 2018-06-05 ENCOUNTER — TELEPHONE (OUTPATIENT)
Dept: PRIMARY CARE CLINIC | Age: 33
End: 2018-06-05

## 2018-06-14 ENCOUNTER — TELEPHONE (OUTPATIENT)
Dept: PRIMARY CARE CLINIC | Age: 33
End: 2018-06-14

## 2018-06-14 ENCOUNTER — OFFICE VISIT (OUTPATIENT)
Dept: PRIMARY CARE CLINIC | Age: 33
End: 2018-06-14
Payer: MEDICAID

## 2018-06-14 VITALS
HEART RATE: 147 BPM | SYSTOLIC BLOOD PRESSURE: 116 MMHG | TEMPERATURE: 99 F | OXYGEN SATURATION: 98 % | HEIGHT: 60 IN | WEIGHT: 72 LBS | DIASTOLIC BLOOD PRESSURE: 78 MMHG | BODY MASS INDEX: 14.14 KG/M2

## 2018-06-14 DIAGNOSIS — R50.9 FEVER, UNSPECIFIED FEVER CAUSE: Primary | ICD-10-CM

## 2018-06-14 DIAGNOSIS — N30.00 ACUTE CYSTITIS WITHOUT HEMATURIA: ICD-10-CM

## 2018-06-14 DIAGNOSIS — F41.9 ANXIETY: ICD-10-CM

## 2018-06-14 LAB
APPEARANCE FLUID: ABNORMAL
BILIRUBIN, POC: ABNORMAL
BLOOD URINE, POC: ABNORMAL
CLARITY, POC: ABNORMAL
COLOR, POC: YELLOW
GLUCOSE URINE, POC: ABNORMAL
KETONES, POC: ABNORMAL
LEUKOCYTE EST, POC: ABNORMAL
NITRITE, POC: ABNORMAL
PH, POC: 6
PROTEIN, POC: ABNORMAL
SPECIFIC GRAVITY, POC: 1.02
UROBILINOGEN, POC: 0.2

## 2018-06-14 PROCEDURE — 99213 OFFICE O/P EST LOW 20 MIN: CPT | Performed by: FAMILY MEDICINE

## 2018-06-14 PROCEDURE — 81002 URINALYSIS NONAUTO W/O SCOPE: CPT | Performed by: FAMILY MEDICINE

## 2018-06-14 RX ORDER — CIPROFLOXACIN 500 MG/1
500 TABLET, FILM COATED ORAL 2 TIMES DAILY
Qty: 20 TABLET | Refills: 0 | Status: SHIPPED | OUTPATIENT
Start: 2018-06-14 | End: 2018-06-24

## 2018-06-14 RX ORDER — CIPROFLOXACIN 500 MG/5ML
500 KIT ORAL 2 TIMES DAILY
Qty: 100 ML | Refills: 0 | Status: SHIPPED | OUTPATIENT
Start: 2018-06-14 | End: 2018-06-14

## 2018-06-14 ASSESSMENT — ENCOUNTER SYMPTOMS
VOMITING: 0
ANAL BLEEDING: 0
BLOOD IN STOOL: 0
NAUSEA: 0
WHEEZING: 0
SHORTNESS OF BREATH: 0
EYE PAIN: 0
COLOR CHANGE: 0

## 2018-06-20 ENCOUNTER — OFFICE VISIT (OUTPATIENT)
Dept: PRIMARY CARE CLINIC | Age: 33
End: 2018-06-20
Payer: MEDICAID

## 2018-06-20 VITALS
DIASTOLIC BLOOD PRESSURE: 78 MMHG | OXYGEN SATURATION: 98 % | TEMPERATURE: 98.4 F | WEIGHT: 72 LBS | HEART RATE: 110 BPM | BODY MASS INDEX: 14.14 KG/M2 | HEIGHT: 60 IN | SYSTOLIC BLOOD PRESSURE: 122 MMHG

## 2018-06-20 DIAGNOSIS — R00.0 TACHYCARDIA: ICD-10-CM

## 2018-06-20 DIAGNOSIS — Q05.9 SPINA BIFIDA, UNSPECIFIED HYDROCEPHALUS PRESENCE, UNSPECIFIED SPINAL REGION (HCC): ICD-10-CM

## 2018-06-20 DIAGNOSIS — N39.0 URINARY TRACT INFECTION ASSOCIATED WITH CATHETERIZATION OF URINARY TRACT, UNSPECIFIED INDWELLING URINARY CATHETER TYPE, INITIAL ENCOUNTER (HCC): Primary | ICD-10-CM

## 2018-06-20 DIAGNOSIS — T83.511A URINARY TRACT INFECTION ASSOCIATED WITH CATHETERIZATION OF URINARY TRACT, UNSPECIFIED INDWELLING URINARY CATHETER TYPE, INITIAL ENCOUNTER (HCC): Primary | ICD-10-CM

## 2018-06-20 PROCEDURE — 99213 OFFICE O/P EST LOW 20 MIN: CPT | Performed by: FAMILY MEDICINE

## 2018-06-20 ASSESSMENT — ENCOUNTER SYMPTOMS
SHORTNESS OF BREATH: 0
COUGH: 0

## 2018-07-26 ENCOUNTER — OFFICE VISIT (OUTPATIENT)
Dept: PRIMARY CARE CLINIC | Age: 33
End: 2018-07-26
Payer: MEDICAID

## 2018-07-26 VITALS
OXYGEN SATURATION: 96 % | DIASTOLIC BLOOD PRESSURE: 90 MMHG | HEART RATE: 131 BPM | BODY MASS INDEX: 21.97 KG/M2 | TEMPERATURE: 99.3 F | WEIGHT: 72 LBS | SYSTOLIC BLOOD PRESSURE: 136 MMHG

## 2018-07-26 DIAGNOSIS — N30.01 ACUTE CYSTITIS WITH HEMATURIA: Primary | ICD-10-CM

## 2018-07-26 DIAGNOSIS — N30.01 ACUTE CYSTITIS WITH HEMATURIA: ICD-10-CM

## 2018-07-26 LAB
APPEARANCE FLUID: ABNORMAL
BILIRUBIN, POC: ABNORMAL
BLOOD URINE, POC: ABNORMAL
CLARITY, POC: ABNORMAL
COLOR, POC: YELLOW
GLUCOSE URINE, POC: ABNORMAL
KETONES, POC: ABNORMAL
LEUKOCYTE EST, POC: ABNORMAL
NITRITE, POC: ABNORMAL
PH, POC: 7
PROTEIN, POC: ABNORMAL
SPECIFIC GRAVITY, POC: 1.01
UROBILINOGEN, POC: 0.2

## 2018-07-26 PROCEDURE — 99213 OFFICE O/P EST LOW 20 MIN: CPT | Performed by: NURSE PRACTITIONER

## 2018-07-26 PROCEDURE — 81002 URINALYSIS NONAUTO W/O SCOPE: CPT | Performed by: NURSE PRACTITIONER

## 2018-07-26 RX ORDER — SULFAMETHOXAZOLE AND TRIMETHOPRIM 200; 40 MG/5ML; MG/5ML
160 SUSPENSION ORAL 2 TIMES DAILY
Qty: 400 ML | Refills: 0 | Status: SHIPPED | OUTPATIENT
Start: 2018-07-26 | End: 2018-08-05

## 2018-07-26 NOTE — PROGRESS NOTES
[address] [place] [address], [facility]  Phone:  [phone]  Fax:  [phone]      Polly Medley is a 28 y.o. male who presents today for his medical conditions/complaints as noted below. Polly Medley is c/o of Other (possible UTI)      Chief Complaint   Patient presents with    Other     possible UTI       HPI:     HPI    Polly Medley presents today with his mother and father for complaints of increased urinary incontinence, foul smelling urine, and lower back pain when his bladder is full. He is a paraplegic and sees a urologist in [place]. He has not been to the urologist in over two years. He self caths five times per day with clean technique. He is anxious and does not want to be admitted to the hospital. He has this fear since he was admitted for tachycardia in Urgent care and after botox treatment a few years ago. He also had botox injections two years ago for his bladder but does not want this again. He was treated for a UTI 34 days ago with Cipro. He states improvement for a few weeks then symptoms returned. He cannot see the urologist in [place] until September. He has increased his fluid intake per orders. Past Medical History:   Diagnosis Date    Anxiety     Chronic kidney disease     kidney stones started at age 5, treated at [address] [place] [address].   Has had lithotripsy and surgery to remove stones    Club Foot     Congenital paraplegia ([address]     Hypertension     diagnosed at age 6    Leg fracture, left     while in body cast due to swelling    Myelomeningocele with hydrocephalus ([address] 1985    Open spina bifida at birth   [facility] Neuropathy ([address]     Pressure sore on ankle     and knee     Scoliosis     Spastic neurogenic bladder     cather x 5 a day      (ventriculoperitoneal) shunt status         Past Surgical History:   Procedure Laterality Date    HERNIA REPAIR      groin    HIP SURGERY Left     LITHOTRIPSY      VENTRICULOPERITONEAL SHUNT      with revisions        Social History   Substance Use Topics    Smoking status: Never Smoker    Smokeless tobacco: Never Used    Alcohol use No        Current Outpatient Prescriptions   Medication Sig Dispense Refill    sulfamethoxazole-trimethoprim (BACTRIM;SEPTRA) 200-40 MG/5ML suspension Take 20 mLs by mouth 2 times daily for 10 days 400 mL 0    Catheters MISC CATHETER 5 TIMES DAILY IN & OUT *Q05.9* 150 each 2    busPIRone (BUSPAR) 5 MG tablet Take 1 tablet by mouth 2 times daily as needed (anxiety) 60 tablet 2    menthol-zinc oxide (CALMOSEPTINE) 0.44-20.6 % OINT ointment Apply topically daily Max 30 ml per day.  potassium citrate (UROCIT-K) 5 MEQ (540 MG) extended release tablet Take 2 tablets 3 times a day with meals 180 tablet 5    lisinopril (PRINIVIL;ZESTRIL) 5 MG tablet TAKE (1) TABLET DAILY FOR BLOOD PRESSURE. 30 tablet 11    Blood Pressure Monitoring [address] Disabled patient who doesn't drive needs BP monitor for home for parents to check it for him 1 Device 0    aspirin 81 MG chewable tablet Take 81 mg by mouth daily.  ALPRAZolam (XANAX) 0.5 MG tablet Take 0.5 mg by mouth 2 times daily as needed for Sleep        No current facility-administered medications for this visit.         Allergies   Allergen Reactions    Latex Swelling and Rash     Had reaction at dentist office-redness, swelling, and rash    Ceftriaxone Other (See Comments)     Fever, hallucinations was transported via ambulance [place]     Vancomycin Other (See Comments)     Red warm hands and itching       Family History   Problem Relation Age of Onset    Colon Cancer Mother 54        In remission    High Cholesterol Mother     High Blood Pressure Mother     Thyroid Disease Mother         Hypothyroidism    Coronary Art Dis Maternal Grandmother         CABG x 2    Heart Attack Maternal Grandmother     High Blood Pressure Maternal Grandmother     Cancer Maternal Grandmother         Pancreatic Cancer   

## 2018-07-28 LAB
ORGANISM: ABNORMAL
URINE CULTURE, ROUTINE: ABNORMAL
URINE CULTURE, ROUTINE: ABNORMAL

## 2018-07-29 DIAGNOSIS — N30.01 ACUTE CYSTITIS WITH HEMATURIA: Primary | ICD-10-CM

## 2018-07-29 RX ORDER — NITROFURANTOIN 25; 75 MG/1; MG/1
100 CAPSULE ORAL 2 TIMES DAILY
Qty: 14 CAPSULE | Refills: 0 | Status: SHIPPED | OUTPATIENT
Start: 2018-07-29 | End: 2018-08-05

## 2018-07-30 ENCOUNTER — TELEPHONE (OUTPATIENT)
Dept: PRIMARY CARE CLINIC | Age: 33
End: 2018-07-30

## 2018-08-07 ENCOUNTER — OFFICE VISIT (OUTPATIENT)
Dept: PRIMARY CARE CLINIC | Age: 33
End: 2018-08-07
Payer: MEDICAID

## 2018-08-07 VITALS
OXYGEN SATURATION: 98 % | HEIGHT: 60 IN | HEART RATE: 140 BPM | WEIGHT: 72 LBS | BODY MASS INDEX: 14.14 KG/M2 | SYSTOLIC BLOOD PRESSURE: 110 MMHG | DIASTOLIC BLOOD PRESSURE: 68 MMHG | TEMPERATURE: 98.8 F

## 2018-08-07 DIAGNOSIS — Q05.9 SPINA BIFIDA, UNSPECIFIED HYDROCEPHALUS PRESENCE, UNSPECIFIED SPINAL REGION (HCC): ICD-10-CM

## 2018-08-07 DIAGNOSIS — T83.511A URINARY TRACT INFECTION ASSOCIATED WITH CATHETERIZATION OF URINARY TRACT, UNSPECIFIED INDWELLING URINARY CATHETER TYPE, INITIAL ENCOUNTER (HCC): ICD-10-CM

## 2018-08-07 DIAGNOSIS — N39.0 URINARY TRACT INFECTION ASSOCIATED WITH CATHETERIZATION OF URINARY TRACT, UNSPECIFIED INDWELLING URINARY CATHETER TYPE, INITIAL ENCOUNTER (HCC): ICD-10-CM

## 2018-08-07 DIAGNOSIS — R00.0 TACHYCARDIA: Primary | ICD-10-CM

## 2018-08-07 PROCEDURE — 99213 OFFICE O/P EST LOW 20 MIN: CPT | Performed by: FAMILY MEDICINE

## 2018-08-07 NOTE — PROGRESS NOTES
started at age 5, treated at [facility]. Has had lithotripsy and surgery to remove stones    Club Foot     [facility]     Hypertension     diagnosed at age 6    Leg fracture, left     while in body cast due to swelling    Myelomeningocele with hydrocephalus ([facility] 1985    Open spina bifida at birth   [facility] Neuropathy     Pressure sore on ankle     and knee     Scoliosis     Spastic neurogenic bladder     cather x 5 a day      (ventriculoperitoneal) shunt status        Past Surgical History:   Procedure Laterality Date    HERNIA REPAIR      groin    HIP SURGERY Left     LITHOTRIPSY      VENTRICULOPERITONEAL SHUNT      with revisions        Social History     Social History    Marital status: Single     Spouse name: N/A    Number of children: N/A    Years of education: N/A     Social History Main Topics    Smoking status: Never Smoker    Smokeless tobacco: Never Used    Alcohol use No    Drug use: No    Sexual activity: No     Other Topics Concern    None     Social History Narrative    Adult living with his mother, does his own transfers, sleeps on twin mattress on the floor     Not working, on disability, enjoys painting, goes to [employer] most days     /68   Pulse 140   Temp 98.8 °F (37.1 °C)   Ht 4' (1.219 m)   Wt 72 lb (32.7 kg)   SpO2 98%   BMI 21.97 kg/m²     Physical Exam   Constitutional: He is oriented to person, place, and time. He appears well-developed and well-nourished. No distress. HENT:   Head: Normocephalic and atraumatic. Neck: Normal range of motion. Neck supple. Cardiovascular: Regular rhythm and normal heart sounds. Tachycardia present. No murmur heard. Decreased pulses on right. Cold, erythematous right lower extremity. Pulmonary/Chest: No accessory muscle usage. No respiratory distress. He has no wheezes. He has no rales. Abdominal: He exhibits no distension and no mass. There is no tenderness.    Musculoskeletal: He

## 2018-08-20 ASSESSMENT — ENCOUNTER SYMPTOMS: COUGH: 0

## 2018-09-25 RX ORDER — LISINOPRIL 5 MG/1
TABLET ORAL
Qty: 30 TABLET | Refills: 0 | Status: SHIPPED | OUTPATIENT
Start: 2018-09-25 | End: 2018-10-25 | Stop reason: SDUPTHER

## 2018-10-09 ENCOUNTER — OFFICE VISIT (OUTPATIENT)
Dept: URGENT CARE | Age: 33
End: 2018-10-09
Payer: MEDICAID

## 2018-10-09 ENCOUNTER — HOSPITAL ENCOUNTER (OUTPATIENT)
Dept: GENERAL RADIOLOGY | Age: 33
Discharge: HOME | End: 2018-10-09
Payer: MEDICAID

## 2018-10-09 ENCOUNTER — TELEPHONE (OUTPATIENT)
Dept: PRIMARY CARE CLINIC | Age: 33
End: 2018-10-09

## 2018-10-09 VITALS
TEMPERATURE: 98.1 F | HEART RATE: 115 BPM | OXYGEN SATURATION: 98 % | SYSTOLIC BLOOD PRESSURE: 137 MMHG | RESPIRATION RATE: 18 BRPM | WEIGHT: 72 LBS | BODY MASS INDEX: 21.97 KG/M2 | DIASTOLIC BLOOD PRESSURE: 95 MMHG

## 2018-10-09 DIAGNOSIS — R31.9 HEMATURIA, UNSPECIFIED TYPE: ICD-10-CM

## 2018-10-09 DIAGNOSIS — N30.01 ACUTE CYSTITIS WITH HEMATURIA: Primary | ICD-10-CM

## 2018-10-09 PROCEDURE — 99213 OFFICE O/P EST LOW 20 MIN: CPT | Performed by: SPECIALIST

## 2018-10-09 PROCEDURE — 74018 RADEX ABDOMEN 1 VIEW: CPT

## 2018-10-09 PROCEDURE — 81002 URINALYSIS NONAUTO W/O SCOPE: CPT | Performed by: SPECIALIST

## 2018-10-09 RX ORDER — NITROFURANTOIN 25; 75 MG/1; MG/1
100 CAPSULE ORAL 2 TIMES DAILY
Qty: 20 CAPSULE | Refills: 0 | Status: SHIPPED | OUTPATIENT
Start: 2018-10-09 | End: 2019-12-16 | Stop reason: SDUPTHER

## 2018-10-09 NOTE — PROGRESS NOTES
Neurological: He is alert and oriented to person, place, and time. Skin: Skin is warm and dry. Psychiatric: He has a normal mood and affect. His speech is normal and behavior is normal.   Nursing note and vitals reviewed. BP (!) 137/95   Pulse 115   Temp 98.1 °F (36.7 °C) (Temporal)   Resp 18   Wt 72 lb (32.7 kg)   SpO2 98%   BMI 21.97 kg/m²     Assessment:       Diagnosis Orders   1. Acute cystitis with hematuria     2. Hematuria, unspecified type  POCT Urinalysis no Micro    XR ABDOMEN (KUB) (SINGLE AP VIEW)     EXAMINATION:  XR ABDOMEN (KUB) (SINGLE AP VIEW)  10/9/2018 4:14 PM   HISTORY: Hematuria. Pain on the right side. COMPARISON: No comparison study. TECHNIQUE: Supine abdomen. FINDINGS:  The patient has severe spinal stenosis and deformity of the   spine. There is a chronic appearing left hip dislocation. The bowel   gas pattern is normal. No organomegaly is seen. The visualized lung   bases are clear. Ventriculoperitoneal shunt tubing is noted.       Impression   No acute findings. Signed by Dr Ike Joshua on 10/9/2018 4:15 PM         Plan:      Orders Placed This Encounter   Procedures    XR ABDOMEN (KUB) (SINGLE AP VIEW)     Standing Status:   Future     Number of Occurrences:   1     Standing Expiration Date:   10/9/2019     Order Specific Question:   Reason for exam:     Answer:   abdominal pain     Order Specific Question:   Reason for exam:     Answer:   hematuria    POCT Urinalysis no Micro       No Follow-up on file.     Orders Placed This Encounter   Procedures    XR ABDOMEN (KUB) (SINGLE AP VIEW)     Standing Status:   Future     Number of Occurrences:   1     Standing Expiration Date:   10/9/2019     Order Specific Question:   Reason for exam:     Answer:   abdominal pain     Order Specific Question:   Reason for exam:     Answer:   hematuria    POCT Urinalysis no Micro     Orders Placed This Encounter   Medications    nitrofurantoin, macrocrystal-monohydrate, and have to limit your fluids, talk with your doctor before you increase your fluid intake.)  · Avoid drinks that are carbonated or have caffeine. They can irritate the bladder. · Urinate often. Try to empty your bladder each time. · To relieve pain, take a hot bath or lay a heating pad (set on low) over your lower belly or genital area. Never go to sleep with a heating pad in place. To help prevent UTIs  · Drink plenty of fluids, enough so that your urine is light yellow or clear like water. If you have kidney, heart, or liver disease and have to limit fluids, talk with your doctor before you increase the amount of fluids you drink. · Urinate when you have the urge. Do not hold your urine for a long time. Urinate before you go to sleep. · Keep your penis clean. Catheter care  If you have a drainage tube (catheter) in place, the following steps will help you care for it. · Always wash your hands before and after touching your catheter. · Check the area around the urethra for inflammation or signs of infection. Signs of infection include irritated, swollen, red, or tender skin, or pus around the catheter. · Clean the area around the catheter with soap and water two times a day. Dry with a clean towel afterward. · Do not apply powder or lotion to the skin around the catheter. To empty the urine collection bag  · Wash your hands with soap and water. · Without touching the drain spout, remove the spout from its sleeve at the bottom of the collection bag. Open the valve on the spout. · Let the urine flow out of the bag and into the toilet or a container. Do not let the tubing or drain spout touch anything. · After you empty the bag, clean the end of the drain spout with tissue and water. Close the valve and put the drain spout back into its sleeve at the bottom of the collection bag. · Wash your hands with soap and water. When should you call for help?   Call your doctor now or seek immediate medical care

## 2018-10-10 LAB
APPEARANCE FLUID: ABNORMAL
BILIRUBIN, POC: NEGATIVE
BLOOD URINE, POC: ABNORMAL
CLARITY, POC: CLEAR
COLOR, POC: YELLOW
GLUCOSE URINE, POC: NEGATIVE
KETONES, POC: NEGATIVE
LEUKOCYTE EST, POC: ABNORMAL
NITRITE, POC: NEGATIVE
PH, POC: 7
PROTEIN, POC: NEGATIVE
SPECIFIC GRAVITY, POC: 1.01
UROBILINOGEN, POC: 1

## 2018-10-11 DIAGNOSIS — Q05.9 SPINA BIFIDA, UNSPECIFIED HYDROCEPHALUS PRESENCE, UNSPECIFIED SPINAL REGION (HCC): ICD-10-CM

## 2018-10-19 DIAGNOSIS — I10 ESSENTIAL HYPERTENSION: ICD-10-CM

## 2018-10-19 RX ORDER — POTASSIUM CITRATE 5 MEQ/1
TABLET, EXTENDED RELEASE ORAL
Qty: 180 TABLET | Refills: 0 | Status: SHIPPED | OUTPATIENT
Start: 2018-10-19 | End: 2018-11-08 | Stop reason: SDUPTHER

## 2018-10-22 DIAGNOSIS — Q05.9 SPINA BIFIDA, UNSPECIFIED HYDROCEPHALUS PRESENCE, UNSPECIFIED SPINAL REGION (HCC): ICD-10-CM

## 2018-10-25 DIAGNOSIS — I10 ESSENTIAL HYPERTENSION: Primary | ICD-10-CM

## 2018-10-25 RX ORDER — LISINOPRIL 5 MG/1
TABLET ORAL
Qty: 30 TABLET | Refills: 3 | Status: SHIPPED | OUTPATIENT
Start: 2018-10-25 | End: 2019-01-25 | Stop reason: SDUPTHER

## 2018-11-08 ENCOUNTER — OFFICE VISIT (OUTPATIENT)
Dept: PRIMARY CARE CLINIC | Age: 33
End: 2018-11-08
Payer: MEDICAID

## 2018-11-08 VITALS
OXYGEN SATURATION: 97 % | WEIGHT: 72 LBS | SYSTOLIC BLOOD PRESSURE: 122 MMHG | HEIGHT: 60 IN | HEART RATE: 105 BPM | TEMPERATURE: 98 F | BODY MASS INDEX: 14.14 KG/M2 | DIASTOLIC BLOOD PRESSURE: 84 MMHG | RESPIRATION RATE: 14 BRPM

## 2018-11-08 DIAGNOSIS — T83.511D URINARY TRACT INFECTION ASSOCIATED WITH CATHETERIZATION OF URINARY TRACT, UNSPECIFIED INDWELLING URINARY CATHETER TYPE, SUBSEQUENT ENCOUNTER: Primary | ICD-10-CM

## 2018-11-08 DIAGNOSIS — I10 ESSENTIAL HYPERTENSION: ICD-10-CM

## 2018-11-08 DIAGNOSIS — N39.0 URINARY TRACT INFECTION ASSOCIATED WITH CATHETERIZATION OF URINARY TRACT, UNSPECIFIED INDWELLING URINARY CATHETER TYPE, SUBSEQUENT ENCOUNTER: ICD-10-CM

## 2018-11-08 DIAGNOSIS — T83.511D URINARY TRACT INFECTION ASSOCIATED WITH CATHETERIZATION OF URINARY TRACT, UNSPECIFIED INDWELLING URINARY CATHETER TYPE, SUBSEQUENT ENCOUNTER: ICD-10-CM

## 2018-11-08 DIAGNOSIS — N39.0 URINARY TRACT INFECTION ASSOCIATED WITH CATHETERIZATION OF URINARY TRACT, UNSPECIFIED INDWELLING URINARY CATHETER TYPE, SUBSEQUENT ENCOUNTER: Primary | ICD-10-CM

## 2018-11-08 LAB
APPEARANCE FLUID: NORMAL
BILIRUBIN, POC: NORMAL
BLOOD URINE, POC: NORMAL
CLARITY, POC: CLEAR
COLOR, POC: YELLOW
GLUCOSE URINE, POC: NORMAL
KETONES, POC: NORMAL
LEUKOCYTE EST, POC: NORMAL
NITRITE, POC: NORMAL
PH, POC: 8.5
PROTEIN, POC: NORMAL
SPECIFIC GRAVITY, POC: 1.01
UROBILINOGEN, POC: 1

## 2018-11-08 PROCEDURE — 81002 URINALYSIS NONAUTO W/O SCOPE: CPT | Performed by: NURSE PRACTITIONER

## 2018-11-08 PROCEDURE — 99213 OFFICE O/P EST LOW 20 MIN: CPT | Performed by: NURSE PRACTITIONER

## 2018-11-08 RX ORDER — POTASSIUM CITRATE 5 MEQ/1
TABLET, EXTENDED RELEASE ORAL
Qty: 180 TABLET | Refills: 2 | Status: SHIPPED | OUTPATIENT
Start: 2018-11-08 | End: 2019-02-12 | Stop reason: SDUPTHER

## 2018-11-08 RX ORDER — BUSPIRONE HYDROCHLORIDE 5 MG/1
5 TABLET ORAL 2 TIMES DAILY PRN
Qty: 60 TABLET | Refills: 2 | Status: SHIPPED | OUTPATIENT
Start: 2018-11-08 | End: 2019-11-04 | Stop reason: SDUPTHER

## 2018-11-08 RX ORDER — OXYBUTYNIN CHLORIDE 5 MG/5ML
5 SYRUP ORAL 2 TIMES DAILY
COMMUNITY
Start: 2018-09-10 | End: 2018-11-08 | Stop reason: SDUPTHER

## 2018-11-08 RX ORDER — OXYBUTYNIN CHLORIDE 5 MG/5ML
5 SYRUP ORAL 2 TIMES DAILY
Qty: 473 ML | Refills: 1 | Status: SHIPPED | OUTPATIENT
Start: 2018-11-08 | End: 2020-02-17

## 2018-11-08 ASSESSMENT — ENCOUNTER SYMPTOMS: RESPIRATORY NEGATIVE: 1

## 2018-11-08 NOTE — PROGRESS NOTES
[facility] PRIMARY CARE  [address] [facility]  [address] [place] [address]  Dept: [phone]  Dept Fax: [phone]  Loc: [phone]    Gautam Pfeiffer is a 35 y.o. male who presents today for his medical conditions/complaints as noted below. Gautam Pfeiffer is c/o of Urinary Tract Infection (FU-symptoms are gone)      Chief Complaint   Patient presents with    Urinary Tract Infection     FU-symptoms are gone       HPI:     HPI  Patient here for follow up on UTIs. Patient here with mother and father. They reports symptoms have completely resolved. He is concerned about reoccurring UTIs. He has been seeing a physician in [place] for these issues. Past Medical History:   Diagnosis Date    Anxiety     Chronic kidney disease     kidney stones started at age 5, treated at [facility].   Has had lithotripsy and surgery to remove stones    Club Foot     [address] [address]     Hypertension     diagnosed at age 6    Leg fracture, left     while in body cast due to swelling    Myelomeningocele with hydrocephalus ([facility] [address] 1985    Open spina bifida at birth   Mike Neff Neuropathy     Pressure sore on ankle     and knee     Scoliosis     Spastic neurogenic bladder     cather x 5 a day      (ventriculoperitoneal) shunt status         Past Surgical History:   Procedure Laterality Date    HERNIA REPAIR      groin    HIP SURGERY Left     LITHOTRIPSY      VENTRICULOPERITONEAL SHUNT      with revisions        Social History   Substance Use Topics    Smoking status: Never Smoker    Smokeless tobacco: Never Used    Alcohol use No        Current Outpatient Prescriptions   Medication Sig Dispense Refill    potassium citrate (UROCIT-K) 5 MEQ (540 MG) extended release tablet TAKE (2) TABLETS THREE TIMES DAILY WITH MEALS. 180 tablet 2    busPIRone (BUSPAR) 5 MG tablet Take 1 tablet by mouth 2 times daily as needed (anxiety) 60 tablet 2    oxybutynin reactive to light. Neck: Normal range of motion. Neck supple. Cardiovascular: Normal rate, regular rhythm and normal heart sounds. No murmur heard. Pulmonary/Chest: Effort normal and breath sounds normal. No respiratory distress. He has no wheezes. Abdominal: Soft. Bowel sounds are normal. He exhibits no distension. There is no tenderness. Musculoskeletal: He exhibits no edema or tenderness. Lumbar back: He exhibits decreased range of motion. Wheelchair bound   Neurological: He is alert and oriented to person, place, and time. Skin: Skin is warm and dry. No rash noted. Psychiatric: He has a normal mood and affect. His behavior is normal. Thought content normal.   Nursing note and vitals reviewed. /84   Pulse 105   Temp 98 °F (36.7 °C) (Temporal)   Resp 14   Ht 4' (1.219 m)   Wt 72 lb (32.7 kg)   SpO2 97%   BMI 21.97 kg/m²     Assessment:      Diagnosis Orders   1. Urinary tract infection associated with catheterization of urinary tract, unspecified indwelling urinary catheter type, subsequent encounter  POCT Urinalysis no Micro    Urine Culture   2. Essential hypertension  potassium citrate (UROCIT-K) 5 MEQ (540 MG) extended release tablet       Results for orders placed or performed in visit on 11/08/18   POCT Urinalysis no Micro   Result Value Ref Range    Color, UA Yellow     Clarity, UA Clear     Glucose, UA POC Neg     Bilirubin, UA Neg     Ketones, UA Neg     Spec Grav, UA 1.015     Blood, UA POC Neg     pH, UA 8.5     Protein, UA POC Neg     Urobilinogen, UA 1.0     Leukocytes, UA Neg     Nitrite, UA Neg     Appearance, Fluid  Clear, Slightly Cloudy       Plan:     I am checking urine culture - we will call with these results. Refills on other meds. Return in about 3 months (around 2/8/2019) for Follow up.     Orders Placed This Encounter   Procedures    Urine Culture     Standing Status:   Future     Number of Occurrences:   1     Standing Expiration Date:

## 2018-11-10 LAB — URINE CULTURE, ROUTINE: NORMAL

## 2018-11-13 ENCOUNTER — TELEPHONE (OUTPATIENT)
Dept: PRIMARY CARE CLINIC | Age: 33
End: 2018-11-13

## 2018-12-30 ENCOUNTER — HOSPITAL ENCOUNTER (INPATIENT)
Age: 33
LOS: 2 days | Discharge: HOME HEALTH | DRG: 699 | End: 2019-01-02
Attending: EMERGENCY MEDICINE | Admitting: HOSPITALIST
Payer: MEDICAID

## 2018-12-30 ENCOUNTER — OFFICE VISIT (OUTPATIENT)
Dept: URGENT CARE | Age: 33
End: 2018-12-30

## 2018-12-30 VITALS
OXYGEN SATURATION: 96 % | SYSTOLIC BLOOD PRESSURE: 120 MMHG | DIASTOLIC BLOOD PRESSURE: 78 MMHG | BODY MASS INDEX: 21.97 KG/M2 | HEART RATE: 154 BPM | TEMPERATURE: 98.3 F | RESPIRATION RATE: 20 BRPM | WEIGHT: 72 LBS

## 2018-12-30 DIAGNOSIS — N39.0 BACTERIAL UTI: Primary | ICD-10-CM

## 2018-12-30 DIAGNOSIS — D72.829 LEUKOCYTOSIS, UNSPECIFIED TYPE: ICD-10-CM

## 2018-12-30 DIAGNOSIS — R00.0 SINUS TACHYCARDIA: ICD-10-CM

## 2018-12-30 DIAGNOSIS — A49.9 BACTERIAL UTI: Primary | ICD-10-CM

## 2018-12-30 DIAGNOSIS — R35.0 URINARY FREQUENCY: Primary | ICD-10-CM

## 2018-12-30 PROBLEM — F41.9 ANXIETY: Status: ACTIVE | Noted: 2018-12-30

## 2018-12-30 LAB
ALBUMIN SERPL-MCNC: 4.5 G/DL (ref 3.5–5.2)
ALP BLD-CCNC: 78 U/L (ref 40–130)
ALT SERPL-CCNC: 20 U/L (ref 5–41)
ANION GAP SERPL CALCULATED.3IONS-SCNC: 10 MMOL/L (ref 7–19)
AST SERPL-CCNC: 21 U/L (ref 5–40)
BACTERIA: ABNORMAL /HPF
BASOPHILS ABSOLUTE: 0.1 K/UL (ref 0–0.2)
BASOPHILS RELATIVE PERCENT: 0.2 % (ref 0–1)
BILIRUB SERPL-MCNC: 1 MG/DL (ref 0.2–1.2)
BILIRUBIN URINE: NEGATIVE
BLOOD, URINE: ABNORMAL
BUN BLDV-MCNC: 9 MG/DL (ref 6–20)
CALCIUM SERPL-MCNC: 9.6 MG/DL (ref 8.6–10)
CHLORIDE BLD-SCNC: 101 MMOL/L (ref 98–111)
CLARITY: ABNORMAL
CO2: 29 MMOL/L (ref 22–29)
COLOR: YELLOW
CREAT SERPL-MCNC: 0.5 MG/DL (ref 0.5–1.2)
EOSINOPHILS ABSOLUTE: 0 K/UL (ref 0–0.6)
EOSINOPHILS RELATIVE PERCENT: 0 % (ref 0–5)
EPITHELIAL CELLS, UA: 0 /HPF (ref 0–5)
GFR NON-AFRICAN AMERICAN: >60
GLUCOSE BLD-MCNC: 115 MG/DL (ref 74–109)
GLUCOSE URINE: NEGATIVE MG/DL
HCT VFR BLD CALC: 48.8 % (ref 42–52)
HEMOGLOBIN: 15.5 G/DL (ref 14–18)
HYALINE CASTS: 2 /HPF (ref 0–8)
KETONES, URINE: NEGATIVE MG/DL
LACTIC ACID: 1.6 MMOL/L (ref 0.5–1.9)
LEUKOCYTE ESTERASE, URINE: ABNORMAL
LYMPHOCYTES ABSOLUTE: 1.4 K/UL (ref 1.1–4.5)
LYMPHOCYTES RELATIVE PERCENT: 4.3 % (ref 20–40)
MCH RBC QN AUTO: 30.5 PG (ref 27–31)
MCHC RBC AUTO-ENTMCNC: 31.8 G/DL (ref 33–37)
MCV RBC AUTO: 95.9 FL (ref 80–94)
MONOCYTES ABSOLUTE: 2.7 K/UL (ref 0–0.9)
MONOCYTES RELATIVE PERCENT: 8.2 % (ref 0–10)
NEUTROPHILS ABSOLUTE: 28.7 K/UL (ref 1.5–7.5)
NEUTROPHILS RELATIVE PERCENT: 86.7 % (ref 50–65)
NITRITE, URINE: POSITIVE
PDW BLD-RTO: 12.4 % (ref 11.5–14.5)
PH UA: 7
PLATELET # BLD: 344 K/UL (ref 130–400)
PMV BLD AUTO: 9.3 FL (ref 9.4–12.4)
POTASSIUM SERPL-SCNC: 4.2 MMOL/L (ref 3.5–5)
PROTEIN UA: NEGATIVE MG/DL
RBC # BLD: 5.09 M/UL (ref 4.7–6.1)
RBC UA: 2 /HPF (ref 0–4)
SODIUM BLD-SCNC: 140 MMOL/L (ref 136–145)
SPECIFIC GRAVITY UA: 1.01
TOTAL PROTEIN: 7.7 G/DL (ref 6.6–8.7)
URINE REFLEX TO CULTURE: YES
UROBILINOGEN, URINE: 1 E.U./DL
WBC # BLD: 33.2 K/UL (ref 4.8–10.8)
WBC UA: 374 /HPF (ref 0–5)

## 2018-12-30 PROCEDURE — G0378 HOSPITAL OBSERVATION PER HR: HCPCS

## 2018-12-30 PROCEDURE — 87086 URINE CULTURE/COLONY COUNT: CPT

## 2018-12-30 PROCEDURE — 80053 COMPREHEN METABOLIC PANEL: CPT

## 2018-12-30 PROCEDURE — 87040 BLOOD CULTURE FOR BACTERIA: CPT

## 2018-12-30 PROCEDURE — 81001 URINALYSIS AUTO W/SCOPE: CPT

## 2018-12-30 PROCEDURE — 99285 EMERGENCY DEPT VISIT HI MDM: CPT

## 2018-12-30 PROCEDURE — 99285 EMERGENCY DEPT VISIT HI MDM: CPT | Performed by: EMERGENCY MEDICINE

## 2018-12-30 PROCEDURE — 51701 INSERT BLADDER CATHETER: CPT

## 2018-12-30 PROCEDURE — 2500000003 HC RX 250 WO HCPCS: Performed by: FAMILY MEDICINE

## 2018-12-30 PROCEDURE — 36415 COLL VENOUS BLD VENIPUNCTURE: CPT

## 2018-12-30 PROCEDURE — 85025 COMPLETE CBC W/AUTO DIFF WBC: CPT

## 2018-12-30 PROCEDURE — 2580000003 HC RX 258: Performed by: FAMILY MEDICINE

## 2018-12-30 PROCEDURE — 93005 ELECTROCARDIOGRAM TRACING: CPT

## 2018-12-30 PROCEDURE — 1210000000 HC MED SURG R&B

## 2018-12-30 PROCEDURE — 6370000000 HC RX 637 (ALT 250 FOR IP): Performed by: FAMILY MEDICINE

## 2018-12-30 PROCEDURE — 87186 SC STD MICRODIL/AGAR DIL: CPT

## 2018-12-30 PROCEDURE — 6360000002 HC RX W HCPCS: Performed by: FAMILY MEDICINE

## 2018-12-30 PROCEDURE — 99222 1ST HOSP IP/OBS MODERATE 55: CPT | Performed by: FAMILY MEDICINE

## 2018-12-30 PROCEDURE — 83605 ASSAY OF LACTIC ACID: CPT

## 2018-12-30 PROCEDURE — 99999 PR OFFICE/OUTPT VISIT,PROCEDURE ONLY: CPT | Performed by: SPECIALIST

## 2018-12-30 PROCEDURE — 2580000003 HC RX 258: Performed by: EMERGENCY MEDICINE

## 2018-12-30 RX ORDER — SODIUM CHLORIDE 9 MG/ML
INJECTION, SOLUTION INTRAVENOUS CONTINUOUS
Status: DISCONTINUED | OUTPATIENT
Start: 2018-12-30 | End: 2019-01-02 | Stop reason: HOSPADM

## 2018-12-30 RX ORDER — ONDANSETRON 2 MG/ML
4 INJECTION INTRAMUSCULAR; INTRAVENOUS EVERY 6 HOURS PRN
Status: DISCONTINUED | OUTPATIENT
Start: 2018-12-30 | End: 2019-01-02 | Stop reason: HOSPADM

## 2018-12-30 RX ORDER — 0.9 % SODIUM CHLORIDE 0.9 %
1000 INTRAVENOUS SOLUTION INTRAVENOUS ONCE
Status: COMPLETED | OUTPATIENT
Start: 2018-12-30 | End: 2018-12-30

## 2018-12-30 RX ORDER — BUSPIRONE HYDROCHLORIDE 5 MG/1
5 TABLET ORAL 2 TIMES DAILY PRN
Status: DISCONTINUED | OUTPATIENT
Start: 2018-12-30 | End: 2019-01-02 | Stop reason: HOSPADM

## 2018-12-30 RX ORDER — ALPRAZOLAM 0.5 MG/1
0.5 TABLET ORAL 2 TIMES DAILY PRN
Status: DISCONTINUED | OUTPATIENT
Start: 2018-12-30 | End: 2019-01-02 | Stop reason: HOSPADM

## 2018-12-30 RX ORDER — ACETAMINOPHEN 325 MG/1
650 TABLET ORAL EVERY 4 HOURS PRN
Status: DISCONTINUED | OUTPATIENT
Start: 2018-12-30 | End: 2019-01-02 | Stop reason: HOSPADM

## 2018-12-30 RX ORDER — SODIUM CHLORIDE 0.9 % (FLUSH) 0.9 %
10 SYRINGE (ML) INJECTION EVERY 12 HOURS SCHEDULED
Status: DISCONTINUED | OUTPATIENT
Start: 2018-12-30 | End: 2019-01-02 | Stop reason: HOSPADM

## 2018-12-30 RX ORDER — LISINOPRIL 5 MG/1
5 TABLET ORAL DAILY
Status: DISCONTINUED | OUTPATIENT
Start: 2018-12-30 | End: 2019-01-02 | Stop reason: HOSPADM

## 2018-12-30 RX ORDER — SODIUM CHLORIDE 0.9 % (FLUSH) 0.9 %
10 SYRINGE (ML) INJECTION PRN
Status: DISCONTINUED | OUTPATIENT
Start: 2018-12-30 | End: 2019-01-02 | Stop reason: HOSPADM

## 2018-12-30 RX ORDER — OXYBUTYNIN CHLORIDE 5 MG/1
5 TABLET ORAL 2 TIMES DAILY
Status: DISCONTINUED | OUTPATIENT
Start: 2018-12-30 | End: 2019-01-02 | Stop reason: HOSPADM

## 2018-12-30 RX ORDER — ASPIRIN 81 MG/1
81 TABLET, CHEWABLE ORAL DAILY
Status: DISCONTINUED | OUTPATIENT
Start: 2018-12-30 | End: 2019-01-02 | Stop reason: HOSPADM

## 2018-12-30 RX ORDER — POTASSIUM CITRATE 10 MEQ/1
10 TABLET, EXTENDED RELEASE ORAL
Status: DISCONTINUED | OUTPATIENT
Start: 2018-12-30 | End: 2019-01-02 | Stop reason: HOSPADM

## 2018-12-30 RX ADMIN — ASPIRIN 81 MG CHEWABLE TABLET 81 MG: 81 TABLET CHEWABLE at 16:44

## 2018-12-30 RX ADMIN — DOXYCYCLINE 100 MG: 100 INJECTION, POWDER, LYOPHILIZED, FOR SOLUTION INTRAVENOUS at 17:02

## 2018-12-30 RX ADMIN — ACETAMINOPHEN 650 MG: 325 TABLET ORAL at 20:28

## 2018-12-30 RX ADMIN — SODIUM CHLORIDE: 9 INJECTION, SOLUTION INTRAVENOUS at 16:44

## 2018-12-30 RX ADMIN — POTASSIUM CITRATE 10 MEQ: 10 TABLET ORAL at 16:44

## 2018-12-30 RX ADMIN — OXYBUTYNIN CHLORIDE 5 MG: 5 TABLET ORAL at 20:28

## 2018-12-30 RX ADMIN — SODIUM CHLORIDE 1000 ML: 9 INJECTION, SOLUTION INTRAVENOUS at 14:08

## 2018-12-30 RX ADMIN — BUSPIRONE HYDROCHLORIDE 5 MG: 5 TABLET ORAL at 20:28

## 2018-12-30 RX ADMIN — ENOXAPARIN SODIUM 40 MG: 40 INJECTION SUBCUTANEOUS at 16:44

## 2018-12-30 ASSESSMENT — ENCOUNTER SYMPTOMS
DIARRHEA: 0
ABDOMINAL PAIN: 0
NAUSEA: 0
VOMITING: 0

## 2018-12-31 LAB
ANION GAP SERPL CALCULATED.3IONS-SCNC: 9 MMOL/L (ref 7–19)
BUN BLDV-MCNC: 8 MG/DL (ref 6–20)
CALCIUM SERPL-MCNC: 8.3 MG/DL (ref 8.6–10)
CHLORIDE BLD-SCNC: 107 MMOL/L (ref 98–111)
CO2: 26 MMOL/L (ref 22–29)
CREAT SERPL-MCNC: <0.5 MG/DL (ref 0.5–1.2)
EKG P AXIS: 63 DEGREES
EKG P-R INTERVAL: 126 MS
EKG Q-T INTERVAL: 272 MS
EKG QRS DURATION: 72 MS
EKG QTC CALCULATION (BAZETT): 394 MS
EKG T AXIS: 32 DEGREES
GFR NON-AFRICAN AMERICAN: >60
GLUCOSE BLD-MCNC: 80 MG/DL (ref 74–109)
HCT VFR BLD CALC: 38.5 % (ref 42–52)
HEMOGLOBIN: 12 G/DL (ref 14–18)
MCH RBC QN AUTO: 30.3 PG (ref 27–31)
MCHC RBC AUTO-ENTMCNC: 31.2 G/DL (ref 33–37)
MCV RBC AUTO: 97.2 FL (ref 80–94)
PDW BLD-RTO: 12.5 % (ref 11.5–14.5)
PLATELET # BLD: 257 K/UL (ref 130–400)
PMV BLD AUTO: 9.1 FL (ref 9.4–12.4)
POTASSIUM REFLEX MAGNESIUM: 3.8 MMOL/L (ref 3.5–5)
RBC # BLD: 3.96 M/UL (ref 4.7–6.1)
SODIUM BLD-SCNC: 142 MMOL/L (ref 136–145)
WBC # BLD: 27 K/UL (ref 4.8–10.8)

## 2018-12-31 PROCEDURE — 85027 COMPLETE CBC AUTOMATED: CPT

## 2018-12-31 PROCEDURE — 99254 IP/OBS CNSLTJ NEW/EST MOD 60: CPT | Performed by: UROLOGY

## 2018-12-31 PROCEDURE — 6360000002 HC RX W HCPCS: Performed by: FAMILY MEDICINE

## 2018-12-31 PROCEDURE — 2580000003 HC RX 258: Performed by: FAMILY MEDICINE

## 2018-12-31 PROCEDURE — 6370000000 HC RX 637 (ALT 250 FOR IP): Performed by: FAMILY MEDICINE

## 2018-12-31 PROCEDURE — 2500000003 HC RX 250 WO HCPCS: Performed by: FAMILY MEDICINE

## 2018-12-31 PROCEDURE — 36415 COLL VENOUS BLD VENIPUNCTURE: CPT

## 2018-12-31 PROCEDURE — 80048 BASIC METABOLIC PNL TOTAL CA: CPT

## 2018-12-31 PROCEDURE — 51701 INSERT BLADDER CATHETER: CPT

## 2018-12-31 PROCEDURE — 99233 SBSQ HOSP IP/OBS HIGH 50: CPT | Performed by: HOSPITALIST

## 2018-12-31 PROCEDURE — 1210000000 HC MED SURG R&B

## 2018-12-31 RX ADMIN — DOXYCYCLINE 100 MG: 100 INJECTION, POWDER, LYOPHILIZED, FOR SOLUTION INTRAVENOUS at 05:13

## 2018-12-31 RX ADMIN — ENOXAPARIN SODIUM 40 MG: 40 INJECTION SUBCUTANEOUS at 17:57

## 2018-12-31 RX ADMIN — OXYBUTYNIN CHLORIDE 5 MG: 5 TABLET ORAL at 08:55

## 2018-12-31 RX ADMIN — BUSPIRONE HYDROCHLORIDE 5 MG: 5 TABLET ORAL at 14:08

## 2018-12-31 RX ADMIN — POTASSIUM CITRATE 10 MEQ: 10 TABLET ORAL at 14:08

## 2018-12-31 RX ADMIN — OXYBUTYNIN CHLORIDE 5 MG: 5 TABLET ORAL at 21:07

## 2018-12-31 RX ADMIN — POTASSIUM CITRATE 10 MEQ: 10 TABLET ORAL at 17:57

## 2018-12-31 RX ADMIN — POTASSIUM CITRATE 10 MEQ: 10 TABLET ORAL at 08:55

## 2018-12-31 RX ADMIN — DOXYCYCLINE 100 MG: 100 INJECTION, POWDER, LYOPHILIZED, FOR SOLUTION INTRAVENOUS at 14:08

## 2018-12-31 RX ADMIN — LISINOPRIL 5 MG: 5 TABLET ORAL at 08:55

## 2018-12-31 RX ADMIN — ASPIRIN 81 MG CHEWABLE TABLET 81 MG: 81 TABLET CHEWABLE at 08:55

## 2018-12-31 RX ADMIN — SODIUM CHLORIDE: 9 INJECTION, SOLUTION INTRAVENOUS at 03:19

## 2019-01-01 PROBLEM — N39.0 E. COLI UTI: Status: ACTIVE | Noted: 2019-01-01

## 2019-01-01 PROBLEM — B96.20 E. COLI UTI: Status: ACTIVE | Noted: 2019-01-01

## 2019-01-01 LAB
ANION GAP SERPL CALCULATED.3IONS-SCNC: 9 MMOL/L (ref 7–19)
BUN BLDV-MCNC: 7 MG/DL (ref 6–20)
CALCIUM SERPL-MCNC: 8.3 MG/DL (ref 8.6–10)
CHLORIDE BLD-SCNC: 110 MMOL/L (ref 98–111)
CO2: 25 MMOL/L (ref 22–29)
CREAT SERPL-MCNC: <0.5 MG/DL (ref 0.5–1.2)
GFR NON-AFRICAN AMERICAN: >60
GLUCOSE BLD-MCNC: 91 MG/DL (ref 74–109)
HCT VFR BLD CALC: 35.2 % (ref 42–52)
HEMOGLOBIN: 11.2 G/DL (ref 14–18)
MCH RBC QN AUTO: 30.9 PG (ref 27–31)
MCHC RBC AUTO-ENTMCNC: 31.8 G/DL (ref 33–37)
MCV RBC AUTO: 97.2 FL (ref 80–94)
ORGANISM: ABNORMAL
PDW BLD-RTO: 12.7 % (ref 11.5–14.5)
PLATELET # BLD: 237 K/UL (ref 130–400)
PMV BLD AUTO: 9.5 FL (ref 9.4–12.4)
POTASSIUM REFLEX MAGNESIUM: 4.1 MMOL/L (ref 3.5–5)
RBC # BLD: 3.62 M/UL (ref 4.7–6.1)
SODIUM BLD-SCNC: 144 MMOL/L (ref 136–145)
URINE CULTURE, ROUTINE: ABNORMAL
URINE CULTURE, ROUTINE: ABNORMAL
WBC # BLD: 17.6 K/UL (ref 4.8–10.8)

## 2019-01-01 PROCEDURE — 6360000002 HC RX W HCPCS: Performed by: HOSPITALIST

## 2019-01-01 PROCEDURE — 36415 COLL VENOUS BLD VENIPUNCTURE: CPT

## 2019-01-01 PROCEDURE — 2500000003 HC RX 250 WO HCPCS: Performed by: FAMILY MEDICINE

## 2019-01-01 PROCEDURE — 85027 COMPLETE CBC AUTOMATED: CPT

## 2019-01-01 PROCEDURE — 6360000002 HC RX W HCPCS: Performed by: FAMILY MEDICINE

## 2019-01-01 PROCEDURE — 51701 INSERT BLADDER CATHETER: CPT

## 2019-01-01 PROCEDURE — 80048 BASIC METABOLIC PNL TOTAL CA: CPT

## 2019-01-01 PROCEDURE — 99232 SBSQ HOSP IP/OBS MODERATE 35: CPT | Performed by: HOSPITALIST

## 2019-01-01 PROCEDURE — 1210000000 HC MED SURG R&B

## 2019-01-01 PROCEDURE — 6370000000 HC RX 637 (ALT 250 FOR IP): Performed by: FAMILY MEDICINE

## 2019-01-01 PROCEDURE — 2580000003 HC RX 258: Performed by: FAMILY MEDICINE

## 2019-01-01 RX ORDER — LEVOFLOXACIN 5 MG/ML
750 INJECTION, SOLUTION INTRAVENOUS EVERY 24 HOURS
Status: DISCONTINUED | OUTPATIENT
Start: 2019-01-01 | End: 2019-01-02 | Stop reason: HOSPADM

## 2019-01-01 RX ADMIN — OXYBUTYNIN CHLORIDE 5 MG: 5 TABLET ORAL at 08:47

## 2019-01-01 RX ADMIN — LISINOPRIL 5 MG: 5 TABLET ORAL at 08:47

## 2019-01-01 RX ADMIN — ASPIRIN 81 MG CHEWABLE TABLET 81 MG: 81 TABLET CHEWABLE at 08:47

## 2019-01-01 RX ADMIN — SODIUM CHLORIDE: 9 INJECTION, SOLUTION INTRAVENOUS at 13:43

## 2019-01-01 RX ADMIN — BUSPIRONE HYDROCHLORIDE 5 MG: 5 TABLET ORAL at 17:25

## 2019-01-01 RX ADMIN — SODIUM CHLORIDE: 9 INJECTION, SOLUTION INTRAVENOUS at 01:56

## 2019-01-01 RX ADMIN — POTASSIUM CITRATE 10 MEQ: 10 TABLET ORAL at 08:47

## 2019-01-01 RX ADMIN — POTASSIUM CITRATE 10 MEQ: 10 TABLET ORAL at 16:45

## 2019-01-01 RX ADMIN — DOXYCYCLINE 100 MG: 100 INJECTION, POWDER, LYOPHILIZED, FOR SOLUTION INTRAVENOUS at 02:04

## 2019-01-01 RX ADMIN — POTASSIUM CITRATE 10 MEQ: 10 TABLET ORAL at 12:22

## 2019-01-01 RX ADMIN — OXYBUTYNIN CHLORIDE 5 MG: 5 TABLET ORAL at 19:49

## 2019-01-01 RX ADMIN — Medication 10 ML: at 19:49

## 2019-01-01 RX ADMIN — Medication 10 ML: at 08:47

## 2019-01-01 RX ADMIN — ENOXAPARIN SODIUM 40 MG: 40 INJECTION SUBCUTANEOUS at 16:45

## 2019-01-01 RX ADMIN — LEVOFLOXACIN 750 MG: 5 INJECTION, SOLUTION INTRAVENOUS at 10:23

## 2019-01-02 ENCOUNTER — TELEPHONE (OUTPATIENT)
Dept: PRIMARY CARE CLINIC | Age: 34
End: 2019-01-02

## 2019-01-02 VITALS
SYSTOLIC BLOOD PRESSURE: 135 MMHG | WEIGHT: 73.3 LBS | BODY MASS INDEX: 14.39 KG/M2 | HEIGHT: 60 IN | HEART RATE: 102 BPM | RESPIRATION RATE: 20 BRPM | DIASTOLIC BLOOD PRESSURE: 81 MMHG | TEMPERATURE: 98.1 F | OXYGEN SATURATION: 96 %

## 2019-01-02 LAB
ANION GAP SERPL CALCULATED.3IONS-SCNC: 11 MMOL/L (ref 7–19)
BUN BLDV-MCNC: 7 MG/DL (ref 6–20)
CALCIUM SERPL-MCNC: 8.3 MG/DL (ref 8.6–10)
CHLORIDE BLD-SCNC: 109 MMOL/L (ref 98–111)
CO2: 23 MMOL/L (ref 22–29)
CREAT SERPL-MCNC: <0.5 MG/DL (ref 0.5–1.2)
GFR NON-AFRICAN AMERICAN: >60
GLUCOSE BLD-MCNC: 81 MG/DL (ref 74–109)
HCT VFR BLD CALC: 33.6 % (ref 42–52)
HEMOGLOBIN: 10.6 G/DL (ref 14–18)
MCH RBC QN AUTO: 30.8 PG (ref 27–31)
MCHC RBC AUTO-ENTMCNC: 31.5 G/DL (ref 33–37)
MCV RBC AUTO: 97.7 FL (ref 80–94)
PDW BLD-RTO: 12.7 % (ref 11.5–14.5)
PLATELET # BLD: 224 K/UL (ref 130–400)
PMV BLD AUTO: 9.8 FL (ref 9.4–12.4)
POTASSIUM REFLEX MAGNESIUM: 3.9 MMOL/L (ref 3.5–5)
RBC # BLD: 3.44 M/UL (ref 4.7–6.1)
SODIUM BLD-SCNC: 143 MMOL/L (ref 136–145)
WBC # BLD: 8.9 K/UL (ref 4.8–10.8)

## 2019-01-02 PROCEDURE — 36415 COLL VENOUS BLD VENIPUNCTURE: CPT

## 2019-01-02 PROCEDURE — 99239 HOSP IP/OBS DSCHRG MGMT >30: CPT | Performed by: HOSPITALIST

## 2019-01-02 PROCEDURE — 6360000002 HC RX W HCPCS: Performed by: HOSPITALIST

## 2019-01-02 PROCEDURE — 6370000000 HC RX 637 (ALT 250 FOR IP): Performed by: FAMILY MEDICINE

## 2019-01-02 PROCEDURE — 80048 BASIC METABOLIC PNL TOTAL CA: CPT

## 2019-01-02 PROCEDURE — 85027 COMPLETE CBC AUTOMATED: CPT

## 2019-01-02 PROCEDURE — 51701 INSERT BLADDER CATHETER: CPT

## 2019-01-02 RX ORDER — CIPROFLOXACIN 500 MG/5ML
500 KIT ORAL 2 TIMES DAILY
Qty: 100 ML | Refills: 0 | Status: SHIPPED | OUTPATIENT
Start: 2019-01-02 | End: 2019-01-12

## 2019-01-02 RX ADMIN — POTASSIUM CITRATE 10 MEQ: 10 TABLET ORAL at 09:09

## 2019-01-02 RX ADMIN — LISINOPRIL 5 MG: 5 TABLET ORAL at 09:09

## 2019-01-02 RX ADMIN — ASPIRIN 81 MG CHEWABLE TABLET 81 MG: 81 TABLET CHEWABLE at 09:09

## 2019-01-02 RX ADMIN — OXYBUTYNIN CHLORIDE 5 MG: 5 TABLET ORAL at 09:09

## 2019-01-02 RX ADMIN — LEVOFLOXACIN 750 MG: 5 INJECTION, SOLUTION INTRAVENOUS at 09:08

## 2019-01-03 ENCOUNTER — TELEPHONE (OUTPATIENT)
Dept: PRIMARY CARE CLINIC | Age: 34
End: 2019-01-03

## 2019-01-03 ENCOUNTER — TELEPHONE (OUTPATIENT)
Dept: INTERNAL MEDICINE | Age: 34
End: 2019-01-03

## 2019-01-03 DIAGNOSIS — N39.0 E. COLI UTI: ICD-10-CM

## 2019-01-03 DIAGNOSIS — B96.20 E. COLI UTI: ICD-10-CM

## 2019-01-03 DIAGNOSIS — A49.9 BACTERIAL UTI: Primary | ICD-10-CM

## 2019-01-03 DIAGNOSIS — N39.0 BACTERIAL UTI: Primary | ICD-10-CM

## 2019-01-04 LAB
BLOOD CULTURE, ROUTINE: NORMAL
CULTURE, BLOOD 2: NORMAL

## 2019-01-08 ENCOUNTER — OFFICE VISIT (OUTPATIENT)
Dept: PRIMARY CARE CLINIC | Age: 34
End: 2019-01-08
Payer: MEDICAID

## 2019-01-08 VITALS
OXYGEN SATURATION: 97 % | HEIGHT: 60 IN | BODY MASS INDEX: 14.33 KG/M2 | WEIGHT: 73 LBS | TEMPERATURE: 98.1 F | HEART RATE: 110 BPM | DIASTOLIC BLOOD PRESSURE: 68 MMHG | SYSTOLIC BLOOD PRESSURE: 122 MMHG

## 2019-01-08 DIAGNOSIS — R00.0 SINUS TACHYCARDIA: ICD-10-CM

## 2019-01-08 DIAGNOSIS — Z09 HOSPITAL DISCHARGE FOLLOW-UP: Primary | ICD-10-CM

## 2019-01-08 DIAGNOSIS — Q05.9 SPINA BIFIDA, UNSPECIFIED HYDROCEPHALUS PRESENCE, UNSPECIFIED SPINAL REGION (HCC): ICD-10-CM

## 2019-01-08 DIAGNOSIS — B96.20 E. COLI UTI: ICD-10-CM

## 2019-01-08 DIAGNOSIS — N39.0 E. COLI UTI: ICD-10-CM

## 2019-01-08 PROCEDURE — 1111F DSCHRG MED/CURRENT MED MERGE: CPT | Performed by: NURSE PRACTITIONER

## 2019-01-08 PROCEDURE — 99214 OFFICE O/P EST MOD 30 MIN: CPT | Performed by: NURSE PRACTITIONER

## 2019-01-08 ASSESSMENT — ENCOUNTER SYMPTOMS: RESPIRATORY NEGATIVE: 1

## 2019-01-25 DIAGNOSIS — I10 ESSENTIAL HYPERTENSION: ICD-10-CM

## 2019-01-25 RX ORDER — LISINOPRIL 5 MG/1
TABLET ORAL
Qty: 30 TABLET | Refills: 0 | Status: SHIPPED | OUTPATIENT
Start: 2019-01-25 | End: 2019-02-12 | Stop reason: SDUPTHER

## 2019-02-12 ENCOUNTER — OFFICE VISIT (OUTPATIENT)
Dept: PRIMARY CARE CLINIC | Age: 34
End: 2019-02-12
Payer: MEDICAID

## 2019-02-12 VITALS
DIASTOLIC BLOOD PRESSURE: 82 MMHG | WEIGHT: 73 LBS | OXYGEN SATURATION: 92 % | HEIGHT: 60 IN | SYSTOLIC BLOOD PRESSURE: 124 MMHG | TEMPERATURE: 98.4 F | HEART RATE: 108 BPM | BODY MASS INDEX: 14.33 KG/M2

## 2019-02-12 DIAGNOSIS — I10 ESSENTIAL HYPERTENSION: ICD-10-CM

## 2019-02-12 DIAGNOSIS — R30.0 DYSURIA: Primary | ICD-10-CM

## 2019-02-12 DIAGNOSIS — Q05.9 SPINA BIFIDA, UNSPECIFIED HYDROCEPHALUS PRESENCE, UNSPECIFIED SPINAL REGION (HCC): ICD-10-CM

## 2019-02-12 LAB
APPEARANCE FLUID: NORMAL
BILIRUBIN, POC: NORMAL
BLOOD URINE, POC: NORMAL
CLARITY, POC: CLEAR
COLOR, POC: YELLOW
GLUCOSE URINE, POC: NORMAL
KETONES, POC: NORMAL
LEUKOCYTE EST, POC: NORMAL
NITRITE, POC: NORMAL
PH, POC: 7
PROTEIN, POC: NORMAL
SPECIFIC GRAVITY, POC: 1.01
UROBILINOGEN, POC: 1

## 2019-02-12 PROCEDURE — 99213 OFFICE O/P EST LOW 20 MIN: CPT | Performed by: FAMILY MEDICINE

## 2019-02-12 PROCEDURE — 81002 URINALYSIS NONAUTO W/O SCOPE: CPT | Performed by: FAMILY MEDICINE

## 2019-02-12 RX ORDER — LISINOPRIL 5 MG/1
TABLET ORAL
Qty: 30 TABLET | Refills: 3 | Status: SHIPPED | OUTPATIENT
Start: 2019-02-12 | End: 2019-08-13 | Stop reason: SDUPTHER

## 2019-02-12 RX ORDER — POTASSIUM CITRATE 5 MEQ/1
TABLET, EXTENDED RELEASE ORAL
Qty: 180 TABLET | Refills: 2 | Status: SHIPPED | OUTPATIENT
Start: 2019-02-12 | End: 2019-06-17 | Stop reason: SDUPTHER

## 2019-02-12 ASSESSMENT — PATIENT HEALTH QUESTIONNAIRE - PHQ9
SUM OF ALL RESPONSES TO PHQ QUESTIONS 1-9: 0
SUM OF ALL RESPONSES TO PHQ QUESTIONS 1-9: 0
2. FEELING DOWN, DEPRESSED OR HOPELESS: 0
1. LITTLE INTEREST OR PLEASURE IN DOING THINGS: 0
SUM OF ALL RESPONSES TO PHQ9 QUESTIONS 1 & 2: 0

## 2019-02-12 ASSESSMENT — ENCOUNTER SYMPTOMS
COUGH: 0
SHORTNESS OF BREATH: 0
COLOR CHANGE: 0

## 2019-06-17 DIAGNOSIS — I10 ESSENTIAL HYPERTENSION: ICD-10-CM

## 2019-06-17 RX ORDER — POTASSIUM CITRATE 5 MEQ/1
TABLET, EXTENDED RELEASE ORAL
Qty: 180 TABLET | Refills: 0 | Status: SHIPPED | OUTPATIENT
Start: 2019-06-17 | End: 2019-08-13 | Stop reason: SDUPTHER

## 2019-06-24 DIAGNOSIS — I10 ESSENTIAL HYPERTENSION: ICD-10-CM

## 2019-06-24 RX ORDER — LISINOPRIL 5 MG/1
TABLET ORAL
Qty: 30 TABLET | Refills: 0 | Status: SHIPPED | OUTPATIENT
Start: 2019-06-24 | End: 2020-02-17

## 2019-08-13 ENCOUNTER — OFFICE VISIT (OUTPATIENT)
Dept: PRIMARY CARE CLINIC | Age: 34
End: 2019-08-13
Payer: MEDICAID

## 2019-08-13 VITALS
WEIGHT: 72 LBS | OXYGEN SATURATION: 99 % | HEART RATE: 97 BPM | SYSTOLIC BLOOD PRESSURE: 120 MMHG | HEIGHT: 60 IN | DIASTOLIC BLOOD PRESSURE: 82 MMHG | TEMPERATURE: 99.2 F | BODY MASS INDEX: 14.14 KG/M2

## 2019-08-13 DIAGNOSIS — I10 ESSENTIAL HYPERTENSION: Primary | ICD-10-CM

## 2019-08-13 DIAGNOSIS — R73.09 ELEVATED GLUCOSE: ICD-10-CM

## 2019-08-13 PROCEDURE — 99213 OFFICE O/P EST LOW 20 MIN: CPT | Performed by: NURSE PRACTITIONER

## 2019-08-13 RX ORDER — LISINOPRIL 5 MG/1
TABLET ORAL
Qty: 30 TABLET | Refills: 5 | Status: SHIPPED | OUTPATIENT
Start: 2019-08-13 | End: 2020-01-16

## 2019-08-13 RX ORDER — POTASSIUM CITRATE 5 MEQ/1
TABLET, EXTENDED RELEASE ORAL
Qty: 180 TABLET | Refills: 5 | Status: SHIPPED | OUTPATIENT
Start: 2019-08-13 | End: 2020-06-22 | Stop reason: SDUPTHER

## 2019-08-13 RX ORDER — SOLIFENACIN SUCCINATE 5 MG/1
5 TABLET, FILM COATED ORAL
COMMUNITY
Start: 2019-04-22 | End: 2020-02-17 | Stop reason: SDUPTHER

## 2019-08-13 NOTE — PROGRESS NOTES
[address] [address]     Phone:  [phone]  Fax:  [phone]      Gala Noland is a 29 y.o. male who presents today for his medical conditions/complaints as noted below. Gala Noland is c/o of Establish Care      Chief Complaint   Patient presents with    \[facility] [place] [facility]\"" Care       HPI:     HPI    Gala Noland presents [facility] to [facility]. He is a former patient of Dr. Inge Lr. He needs med refills. He was born with spina bifida and is a paraplegic. He functions well in a wheelchair. He goes to adult  most days. His mothe ris present with him today. They deny any new issues. He has no skin issues, per patient and mother. He self caths. He has frequent UTIs, but has went several months without one. He denies issues today. He also has HTN and tachycardia. He is well controlled today. He takes his medication regularly. He also has anxiety. He takes BuSpar maybe one per week for this. Sees ortho, neurosurgery and urology at [facility]. Past Medical History:   Diagnosis Date    Anxiety     Chronic kidney disease     kidney stones started at age 5, treated at [facility].   Has had lithotripsy and surgery to remove stones    Club Foot     [facility]en[facility] paraplegia ([facility] [address]     Hypertension     diagnosed at age 6    Leg fracture, left     while in body cast due to swelling    Myelomeningocele with hydrocephalus ([facility] [address] 1985    Open spina bifida at birth   Chelsie Imam Neuropathy     Pressure sore on ankle     and knee     Scoliosis     Spastic neurogenic bladder     cather x 5 a day      (ventriculoperitoneal) shunt status         Past Surgical History:   Procedure Laterality Date    HERNIA REPAIR      groin    HIP SURGERY Left     LITHOTRIPSY      VENTRICULOPERITONEAL SHUNT      with revisions        Social History     Tobacco Use    Smoking status: Never Smoker    Smokeless tobacco: Never Used   Substance Use Topics    Alcohol use: No        Current Outpatient Medications   Medication Sig Dispense Refill    solifenacin (VESICARE) 5 MG tablet Take 5 mg by mouth      lisinopril (PRINIVIL;ZESTRIL) 5 MG tablet TAKE (1) TABLET DAILY FOR BLOOD PRESSURE. 30 tablet 5    potassium citrate (UROCIT-K) 5 MEQ (540 MG) extended release tablet TAKE (2) TABLETS BY MOUTH THREE TIMES DAILY WITH MEALS. 180 tablet 5    Catheters MISC CATHETER 6 TIMES DAILY IN & OUT *Q05.9* 180 each 0    Catheters MISC CATHETER 6 TIMES DAILY IN & OUT *Q05.9* 180 each 1    busPIRone (BUSPAR) 5 MG tablet Take 1 tablet by mouth 2 times daily as needed (anxiety) 60 tablet 2    menthol-zinc oxide (CALMOSEPTINE) 0.44-20.6 % OINT ointment Apply topically daily Max 30 ml per day.  Blood Pressure Monitoring [address] Disabled patient who doesn't drive needs BP monitor for home for parents to check it for him 1 Device 0    aspirin 81 MG chewable tablet Take 81 mg by mouth daily.  lisinopril (PRINIVIL;ZESTRIL) 5 MG tablet TAKE (1) TABLET DAILY FOR BLOOD PRESSURE. 30 tablet 0    oxybutynin (DITROPAN) 5 MG/5ML syrup Take 5 mLs by mouth 2 times daily (Patient not taking: Reported on 8/13/2019) 473 mL 1     No current facility-administered medications for this visit. Allergies   Allergen Reactions    Latex Swelling and Rash     Had reaction at dentist office-redness, swelling, and rash  Other reaction(s): rash  Had reaction at dentist office-redness, swelling, and rash    Ceftriaxone Other (See Comments)     Other reaction(s): Other (see comments)  Fever, hallucinations was transported via ambulance [address]   Other reaction(s): high fever  Fever, hallucinations was transported via ambulance [place]     Vancomycin Other (See Comments)     Other reaction(s):  Other (See Comments)  Red warm hands and itching  Red warm hands and itching       Family History   Problem Relation Age of Onset    Colon Cancer Mother 54        In remission    High Cholesterol Mother     High waist down   Lymphadenopathy:     He has no cervical adenopathy. Neurological: He is alert and oriented to person, place, and time. Skin: Skin is warm and dry. No rash noted. Psychiatric: He has a normal mood and affect. Nursing note and vitals reviewed. /82   Pulse 97   Temp 99.2 °F (37.3 °C) (Temporal)   Ht 4' (1.219 m)   Wt 72 lb (32.7 kg)   SpO2 99%   BMI 21.97 kg/m²     Assessment:      Diagnosis Orders   1. Essential hypertension  lisinopril (PRINIVIL;ZESTRIL) 5 MG tablet    potassium citrate (UROCIT-K) 5 MEQ (540 MG) extended release tablet    CBC    Comprehensive Metabolic Panel    Lipid, Fasting   2. Elevated glucose  Hemoglobin A1C       No results found for this visit on 08/13/19. Plan:     Meds refilled. Labs ordered. Patient will get these while fasting. Will call with results. Added hemoglocin A1C because at one time, he was prediabetic. Return in about 6 months (around 2/13/2020), or if symptoms worsen or fail to improve, for Physical.    Orders Placed This Encounter   Procedures    CBC     Standing Status:   Future     Standing Expiration Date:   8/13/2020    Comprehensive Metabolic Panel     Standing Status:   Future     Standing Expiration Date:   8/13/2020    Lipid, Fasting     Standing Status:   Future     Standing Expiration Date:   8/13/2020    Hemoglobin A1C     Standing Status:   Future     Standing Expiration Date:   8/13/2020       Orders Placed This Encounter   Medications    lisinopril (PRINIVIL;ZESTRIL) 5 MG tablet     Sig: TAKE (1) TABLET DAILY FOR BLOOD PRESSURE. Dispense:  30 tablet     Refill:  5    potassium citrate (UROCIT-K) 5 MEQ (540 MG) extended release tablet     Sig: TAKE (2) TABLETS BY MOUTH THREE TIMES DAILY WITH MEALS. Dispense:  180 tablet     Refill:  5        Patient offered educational materials - see patient instructions for any instruction needed. Discussed use, benefit, and side effects of prescribed medications.

## 2019-08-14 ASSESSMENT — ENCOUNTER SYMPTOMS
SHORTNESS OF BREATH: 0
VOMITING: 0
BACK PAIN: 0
DIARRHEA: 0
COUGH: 0
WHEEZING: 0
ABDOMINAL PAIN: 0
NAUSEA: 0

## 2019-09-04 DIAGNOSIS — Q05.9 SPINA BIFIDA, UNSPECIFIED HYDROCEPHALUS PRESENCE, UNSPECIFIED SPINAL REGION (HCC): ICD-10-CM

## 2019-11-04 RX ORDER — BUSPIRONE HYDROCHLORIDE 5 MG/1
TABLET ORAL
Qty: 60 TABLET | Refills: 0 | Status: SHIPPED | OUTPATIENT
Start: 2019-11-04 | End: 2020-02-17 | Stop reason: SDUPTHER

## 2019-12-16 ENCOUNTER — OFFICE VISIT (OUTPATIENT)
Dept: PRIMARY CARE CLINIC | Age: 34
End: 2019-12-16
Payer: MEDICAID

## 2019-12-16 VITALS
BODY MASS INDEX: 21.97 KG/M2 | WEIGHT: 72 LBS | TEMPERATURE: 98.4 F | OXYGEN SATURATION: 98 % | HEART RATE: 102 BPM | RESPIRATION RATE: 20 BRPM | SYSTOLIC BLOOD PRESSURE: 118 MMHG | DIASTOLIC BLOOD PRESSURE: 84 MMHG

## 2019-12-16 DIAGNOSIS — N31.9 NEUROGENIC BLADDER: ICD-10-CM

## 2019-12-16 DIAGNOSIS — N30.01 ACUTE CYSTITIS WITH HEMATURIA: Primary | ICD-10-CM

## 2019-12-16 DIAGNOSIS — N30.01 ACUTE CYSTITIS WITH HEMATURIA: ICD-10-CM

## 2019-12-16 PROCEDURE — 99213 OFFICE O/P EST LOW 20 MIN: CPT | Performed by: NURSE PRACTITIONER

## 2019-12-16 PROCEDURE — 81002 URINALYSIS NONAUTO W/O SCOPE: CPT | Performed by: NURSE PRACTITIONER

## 2019-12-16 RX ORDER — NITROFURANTOIN 25; 75 MG/1; MG/1
100 CAPSULE ORAL 2 TIMES DAILY
Qty: 20 CAPSULE | Refills: 0 | Status: SHIPPED | OUTPATIENT
Start: 2019-12-16 | End: 2019-12-26

## 2019-12-19 ENCOUNTER — TELEPHONE (OUTPATIENT)
Dept: PRIMARY CARE CLINIC | Age: 34
End: 2019-12-19

## 2019-12-19 LAB
ORGANISM: ABNORMAL
URINE CULTURE, ROUTINE: ABNORMAL
URINE CULTURE, ROUTINE: ABNORMAL

## 2020-02-13 DIAGNOSIS — I10 ESSENTIAL HYPERTENSION: ICD-10-CM

## 2020-02-13 DIAGNOSIS — R73.09 ELEVATED GLUCOSE: ICD-10-CM

## 2020-02-13 LAB
ALBUMIN SERPL-MCNC: 4.5 G/DL (ref 3.5–5.2)
ALP BLD-CCNC: 73 U/L (ref 40–130)
ALT SERPL-CCNC: 16 U/L (ref 5–41)
ANION GAP SERPL CALCULATED.3IONS-SCNC: 14 MMOL/L (ref 7–19)
AST SERPL-CCNC: 17 U/L (ref 5–40)
BILIRUB SERPL-MCNC: 0.7 MG/DL (ref 0.2–1.2)
BUN BLDV-MCNC: 16 MG/DL (ref 6–20)
CALCIUM SERPL-MCNC: 9.6 MG/DL (ref 8.6–10)
CHLORIDE BLD-SCNC: 102 MMOL/L (ref 98–111)
CHOLESTEROL, FASTING: 201 MG/DL (ref 160–199)
CO2: 27 MMOL/L (ref 22–29)
CREAT SERPL-MCNC: <0.5 MG/DL (ref 0.5–1.2)
GFR NON-AFRICAN AMERICAN: >60
GLUCOSE BLD-MCNC: 85 MG/DL (ref 74–109)
HBA1C MFR BLD: 5.3 % (ref 4–6)
HCT VFR BLD CALC: 48.3 % (ref 42–52)
HDLC SERPL-MCNC: 51 MG/DL (ref 55–121)
HEMOGLOBIN: 15.5 G/DL (ref 14–18)
LDL CHOLESTEROL CALCULATED: 140 MG/DL
MCH RBC QN AUTO: 31.1 PG (ref 27–31)
MCHC RBC AUTO-ENTMCNC: 32.1 G/DL (ref 33–37)
MCV RBC AUTO: 97 FL (ref 80–94)
PDW BLD-RTO: 12.1 % (ref 11.5–14.5)
PLATELET # BLD: 285 K/UL (ref 130–400)
PMV BLD AUTO: 10.1 FL (ref 9.4–12.4)
POTASSIUM SERPL-SCNC: 4.7 MMOL/L (ref 3.5–5)
RBC # BLD: 4.98 M/UL (ref 4.7–6.1)
SODIUM BLD-SCNC: 143 MMOL/L (ref 136–145)
TOTAL PROTEIN: 7.2 G/DL (ref 6.6–8.7)
TRIGLYCERIDE, FASTING: 50 MG/DL (ref 0–149)
WBC # BLD: 6.6 K/UL (ref 4.8–10.8)

## 2020-02-17 ENCOUNTER — OFFICE VISIT (OUTPATIENT)
Dept: PRIMARY CARE CLINIC | Age: 35
End: 2020-02-17
Payer: MEDICAID

## 2020-02-17 VITALS
BODY MASS INDEX: 14.14 KG/M2 | HEART RATE: 132 BPM | OXYGEN SATURATION: 99 % | HEIGHT: 60 IN | SYSTOLIC BLOOD PRESSURE: 122 MMHG | TEMPERATURE: 97.9 F | DIASTOLIC BLOOD PRESSURE: 80 MMHG | WEIGHT: 72 LBS

## 2020-02-17 PROBLEM — I10 ESSENTIAL HYPERTENSION: Status: ACTIVE | Noted: 2020-02-17

## 2020-02-17 PROCEDURE — 99395 PREV VISIT EST AGE 18-39: CPT | Performed by: NURSE PRACTITIONER

## 2020-02-17 PROCEDURE — 69210 REMOVE IMPACTED EAR WAX UNI: CPT | Performed by: NURSE PRACTITIONER

## 2020-02-17 RX ORDER — BUSPIRONE HYDROCHLORIDE 5 MG/1
TABLET ORAL
Qty: 60 TABLET | Refills: 11 | Status: SHIPPED | OUTPATIENT
Start: 2020-02-17 | End: 2021-08-03 | Stop reason: SDUPTHER

## 2020-02-17 RX ORDER — SOLIFENACIN SUCCINATE 5 MG/1
5 TABLET, FILM COATED ORAL DAILY
Qty: 30 TABLET | Refills: 11 | Status: SHIPPED | OUTPATIENT
Start: 2020-02-17 | End: 2021-02-02 | Stop reason: SDUPTHER

## 2020-02-17 RX ORDER — LISINOPRIL 5 MG/1
TABLET ORAL
Qty: 30 TABLET | Refills: 11 | Status: SHIPPED | OUTPATIENT
Start: 2020-02-17 | End: 2021-02-02 | Stop reason: SDUPTHER

## 2020-02-17 ASSESSMENT — PATIENT HEALTH QUESTIONNAIRE - PHQ9
1. LITTLE INTEREST OR PLEASURE IN DOING THINGS: 0
2. FEELING DOWN, DEPRESSED OR HOPELESS: 0
SUM OF ALL RESPONSES TO PHQ9 QUESTIONS 1 & 2: 0
SUM OF ALL RESPONSES TO PHQ QUESTIONS 1-9: 0
SUM OF ALL RESPONSES TO PHQ QUESTIONS 1-9: 0

## 2020-02-17 ASSESSMENT — ENCOUNTER SYMPTOMS
SINUS PAIN: 0
SHORTNESS OF BREATH: 0
VOMITING: 0
ABDOMINAL PAIN: 0
NAUSEA: 0
WHEEZING: 0
DIARRHEA: 0
SINUS PRESSURE: 0
EYE PAIN: 0
COUGH: 0
BACK PAIN: 0

## 2020-02-17 NOTE — PROGRESS NOTES
[address] [address]     Phone:  [phone]  Fax:  [phone]      Raghav Pandey is a 29 y.o. male who presents today for his medical conditions/complaints as noted below. Raghav Pandey is c/o of Annual Exam (doing good)      Chief Complaint   Patient presents with    Annual Exam     doing good       HPI:     HPI    Raghav Pandey presents today for an annual wellness exam.  His mother and father are present with him today. He was born with spina bifida and has congenital paraplegia. He has a neurogenic bladder and has to in and out cath himself 4-6 times per day. He does follow-up with urology in [place]. He states he only sees them yearly. He has no urinary complaints of infections. He is in a wheelchair. He is alert and oriented. He does have known tachycardia. He states he gets anxious when coming to the doctor. He did take a BuSpar 5 mg before coming to this appointment. He states his pulse is from  at home. He denies any chest pain or shortness of breath. He was once on a beta-blocker, metoprolol, he states this made him sweat and feel fatigued. He does not wish to take any further medications. He typically Goes to [facility] 5 days/week. After checking ears, he does state he has had some ear pressure and popping lately. He denies any fevers. Past Medical History:   Diagnosis Date    Anxiety     Chronic kidney disease     kidney stones started at age 5, treated at [facility].   Has had lithotripsy and surgery to remove stones    Club Foot     [facility] [address]     Hypertension     diagnosed at age 6    Leg fracture, left     while in body cast due to swelling    Myelomeningocele with hydrocephalus ([address] 1985    Open spina bifida at birth    Neuropathy     Pressure sore on ankle     and knee     Scoliosis     Spastic neurogenic bladder     cather x 5 a day      (ventriculoperitoneal) shunt status         Past Surgical History:   Procedure Laterality Date    HERNIA REPAIR      groin    HIP SURGERY Left     LITHOTRIPSY      VENTRICULOPERITONEAL SHUNT      with revisions        Social History     Tobacco Use    Smoking status: Never Smoker    Smokeless tobacco: Never Used   Substance Use Topics    Alcohol use: No        Current Outpatient Medications   Medication Sig Dispense Refill    Catheters MISC CATHETER 6 TIMES DAILY IN & OUT *Q05.9* 180 each 11    busPIRone (BUSPAR) 5 MG tablet TAKE 1 TABLET TWICE DAILY AS NEEDED FOR ANXIETY 60 tablet 11    lisinopril (PRINIVIL;ZESTRIL) 5 MG tablet TAKE (1) TABLET DAILY FOR BLOOD PRESSURE. 30 tablet 11    solifenacin (VESICARE) 5 MG tablet Take 1 tablet by mouth daily Take 5 mg by mouth 30 tablet 11    potassium citrate (UROCIT-K) 5 MEQ (540 MG) extended release tablet TAKE (2) TABLETS BY MOUTH THREE TIMES DAILY WITH MEALS. 180 tablet 5    menthol-zinc oxide (CALMOSEPTINE) 0.44-20.6 % OINT ointment Apply topically daily Max 30 ml per day.  Blood Pressure Monitoring [address] Disabled patient who doesn't drive needs BP monitor for home for parents to check it for him 1 Device 0    aspirin 81 MG chewable tablet Take 81 mg by mouth daily.  Catheters MISC CATHETER 6 TIMES DAILY IN & OUT *Q05.9* 180 each 5     No current facility-administered medications for this visit. Allergies   Allergen Reactions    Latex Swelling and Rash     Had reaction at dentist office-redness, swelling, and rash  Other reaction(s): rash  Had reaction at dentist office-redness, swelling, and rash    Ceftriaxone Other (See Comments)     Other reaction(s): Other (see comments)  Fever, hallucinations was transported via ambulance [address]   Other reaction(s): high fever  Fever, hallucinations was transported via ambulance [place]     Vancomycin Other (See Comments)     Other reaction(s):  Other (See Comments)  Red warm hands and itching  Red warm hands and itching Family History   Problem Relation Age of Onset    Colon Cancer Mother 54        In remission    High Cholesterol Mother     High Blood Pressure Mother     Thyroid Disease Mother         Hypothyroidism    Coronary Art Dis Maternal Grandmother         CABG x 2    Heart Attack Maternal Grandmother     High Blood Pressure Maternal Grandmother     Cancer Maternal Grandmother         Pancreatic Cancer    Heart Failure Maternal Grandfather          in     Immunodeficiency Neg Hx                Review of Systems   Constitutional: Negative for appetite change, fatigue and fever. HENT: Positive for ear discharge and ear pain. Negative for congestion, sinus pressure and sinus pain. Eyes: Negative for pain and visual disturbance. Respiratory: Negative for cough, shortness of breath and wheezing. Cardiovascular: Negative for chest pain, palpitations and leg swelling. Gastrointestinal: Negative for abdominal pain, diarrhea, nausea and vomiting. Endocrine: Negative for cold intolerance, heat intolerance, polydipsia, polyphagia and polyuria. Genitourinary: Negative for dysuria, frequency, penile pain and urgency. Musculoskeletal: Negative for arthralgias and back pain. Skin: Negative for rash and wound. Neurological: Positive for weakness. Negative for dizziness, tremors and headaches. Hematological: Negative for adenopathy. Psychiatric/Behavioral: Negative for dysphoric mood and sleep disturbance. The patient is nervous/anxious. Objective:     Physical Exam  Vitals signs and nursing note reviewed. Constitutional:       General: He is not in acute distress. Appearance: He is well-developed. HENT:      Head: Normocephalic and atraumatic. Right Ear: External ear normal. There is impacted cerumen. Left Ear: External ear normal. There is impacted cerumen. Nose: Nose normal.      Mouth/Throat:      Pharynx: No oropharyngeal exudate.    Eyes:      General: 02/17/20. Plan:     Follow-up with specialist as scheduled. Continue current medications. If pulse continues to stay above 120, will start propanolol. Patient does not wish to do this at this time. I have advised him and his mother and father to keep a check on his pulse. They state he checks this at least daily. He does have anxiety. Medications and self catheters refilled. Ceruminosis is noted bilaterally. Wax is removed by syringing and manual debridement. Instructions for home care to prevent wax buildup are given. Return in about 4 months (around 6/17/2020), or if symptoms worsen or fail to improve, for Tachycardia, hypertension, Anxiety. Orders Placed This Encounter   Procedures    Lipid, Fasting     Standing Status:   Future     Standing Expiration Date:   2/17/2021    23442 - HI REMOVE IMPACTED EAR WAX       Orders Placed This Encounter   Medications    Catheters MISC     Sig: CATHETER 6 TIMES DAILY IN & OUT *Q05.9*     Dispense:  180 each     Refill:  11     $    busPIRone (BUSPAR) 5 MG tablet     Sig: TAKE 1 TABLET TWICE DAILY AS NEEDED FOR ANXIETY     Dispense:  60 tablet     Refill:  11     $    lisinopril (PRINIVIL;ZESTRIL) 5 MG tablet     Sig: TAKE (1) TABLET DAILY FOR BLOOD PRESSURE. Dispense:  30 tablet     Refill:  11    solifenacin (VESICARE) 5 MG tablet     Sig: Take 1 tablet by mouth daily Take 5 mg by mouth     Dispense:  30 tablet     Refill:  11        Patient offered educational materials - see patient instructions for any instruction needed. Discussed use, benefit, and side effects of prescribed medications. All patient questions answered. Instructed to continue current medications, diet and exercise. Patient agreed with treatment plan. Follow up as directed. Patient was advised to go to the ED if condition ever becomes emergent.      EMR Dragon/transcription disclaimer: Some of this encounter note is an electronic transcription/translation of spoken

## 2020-02-25 ENCOUNTER — OFFICE VISIT (OUTPATIENT)
Dept: PRIMARY CARE CLINIC | Age: 35
End: 2020-02-25
Payer: MEDICAID

## 2020-02-25 VITALS
SYSTOLIC BLOOD PRESSURE: 128 MMHG | OXYGEN SATURATION: 98 % | HEART RATE: 138 BPM | DIASTOLIC BLOOD PRESSURE: 84 MMHG | TEMPERATURE: 98.3 F

## 2020-02-25 DIAGNOSIS — N30.00 ACUTE CYSTITIS WITHOUT HEMATURIA: ICD-10-CM

## 2020-02-25 LAB
APPEARANCE FLUID: ABNORMAL
BILIRUBIN, POC: ABNORMAL
BLOOD URINE, POC: ABNORMAL
CLARITY, POC: ABNORMAL
COLOR, POC: YELLOW
GLUCOSE URINE, POC: ABNORMAL
KETONES, POC: 15
LEUKOCYTE EST, POC: ABNORMAL
NITRITE, POC: ABNORMAL
PH, POC: 7.5
PROTEIN, POC: ABNORMAL
SPECIFIC GRAVITY, POC: 1.01
UROBILINOGEN, POC: 0.2

## 2020-02-25 PROCEDURE — 81002 URINALYSIS NONAUTO W/O SCOPE: CPT | Performed by: NURSE PRACTITIONER

## 2020-02-25 PROCEDURE — 99213 OFFICE O/P EST LOW 20 MIN: CPT | Performed by: NURSE PRACTITIONER

## 2020-02-25 RX ORDER — NITROFURANTOIN 25; 75 MG/1; MG/1
100 CAPSULE ORAL 2 TIMES DAILY
Qty: 20 CAPSULE | Refills: 0 | Status: SHIPPED | OUTPATIENT
Start: 2020-02-25 | End: 2020-03-06

## 2020-02-25 ASSESSMENT — ENCOUNTER SYMPTOMS
COUGH: 0
PHOTOPHOBIA: 0
VOICE CHANGE: 0
VOMITING: 0
SHORTNESS OF BREATH: 0
RHINORRHEA: 0
BACK PAIN: 0
NAUSEA: 0
COLOR CHANGE: 0

## 2020-02-25 NOTE — PROGRESS NOTES
kg/m2 - 21.97 kg/m2 22.27 kg/m2 22.27 kg/m2   Pain Level - - - - - -   Some recent data might be hidden       Family History   Problem Relation Age of Onset    Colon Cancer Mother 54        In remission    High Cholesterol Mother     High Blood Pressure Mother     Thyroid Disease Mother         Hypothyroidism    Coronary Art Dis Maternal Grandmother         CABG x 2    Heart Attack Maternal Grandmother     High Blood Pressure Maternal Grandmother     Cancer Maternal Grandmother         Pancreatic Cancer    Heart Failure Maternal Grandfather          in     Immunodeficiency Neg Hx        Social History     Tobacco Use    Smoking status: Never Smoker    Smokeless tobacco: Never Used   Substance Use Topics    Alcohol use: No      Current Outpatient Medications   Medication Sig Dispense Refill    nitrofurantoin, macrocrystal-monohydrate, (MACROBID) 100 MG capsule Take 1 capsule by mouth 2 times daily for 10 days 20 capsule 0    Catheters MISC CATHETER 6 TIMES DAILY IN & OUT *Q05.9* 180 each 11    busPIRone (BUSPAR) 5 MG tablet TAKE 1 TABLET TWICE DAILY AS NEEDED FOR ANXIETY 60 tablet 11    lisinopril (PRINIVIL;ZESTRIL) 5 MG tablet TAKE (1) TABLET DAILY FOR BLOOD PRESSURE. 30 tablet 11    solifenacin (VESICARE) 5 MG tablet Take 1 tablet by mouth daily Take 5 mg by mouth 30 tablet 11    Catheters MISC CATHETER 6 TIMES DAILY IN & OUT *Q05.9* 180 each 5    potassium citrate (UROCIT-K) 5 MEQ (540 MG) extended release tablet TAKE (2) TABLETS BY MOUTH THREE TIMES DAILY WITH MEALS. 180 tablet 5    menthol-zinc oxide (CALMOSEPTINE) 0.44-20.6 % OINT ointment Apply topically daily Max 30 ml per day.  Blood Pressure Monitoring [address] Disabled patient who doesn't drive needs BP monitor for home for parents to check it for him 1 Device 0    aspirin 81 MG chewable tablet Take 81 mg by mouth daily. No current facility-administered medications for this visit.       Allergies   Allergen Psychiatric/Behavioral: Negative for sleep disturbance and suicidal ideas. Objective:     Physical Exam  Vitals signs and nursing note reviewed. Constitutional:       Appearance: He is well-developed. HENT:      Head: Atraumatic. Right Ear: External ear normal.      Left Ear: External ear normal.      Nose: Nose normal.   Eyes:      Conjunctiva/sclera: Conjunctivae normal.      Pupils: Pupils are equal, round, and reactive to light. Neck:      Musculoskeletal: Normal range of motion and neck supple. Cardiovascular:      Rate and Rhythm: Normal rate and regular rhythm. Heart sounds: Normal heart sounds, S1 normal and S2 normal.   Pulmonary:      Effort: Pulmonary effort is normal.      Breath sounds: Normal breath sounds. Abdominal:      General: Bowel sounds are normal.      Palpations: Abdomen is soft. Musculoskeletal: Normal range of motion. Skin:     General: Skin is warm and dry. Neurological:      Mental Status: He is alert and oriented to person, place, and time. Psychiatric:         Behavior: Behavior normal.       /84 (Site: Right Upper Arm, Position: Sitting)   Pulse 138   Temp 98.3 °F (36.8 °C)   SpO2 98%     Assessment:       Diagnosis Orders   1. Acute cystitis without hematuria  Urine Culture   2. Dysuria  POCT Urinalysis no Micro       Results for orders placed or performed in visit on 02/25/20   POCT Urinalysis no Micro   Result Value Ref Range    Color, UA yellow     Clarity, UA cloudy     Glucose, UA POC neg     Bilirubin, UA neg     Ketones, UA 15     Spec Grav, UA 1.015     Blood, UA POC trace-lysed     pH, UA 7.5     Protein, UA POC neg     Urobilinogen, UA 0.2     Leukocytes, UA large     Nitrite, UA neg     Appearance, Fluid Cloudy (A) Clear, Slightly Cloudy       Plan: Will send urine for culture; patient will begin macrobid. I discussed sterile technique for in and out cath. He will follow-up for worsening symptoms.      Patient given

## 2020-02-28 LAB
ORGANISM: ABNORMAL
URINE CULTURE, ROUTINE: ABNORMAL
URINE CULTURE, ROUTINE: ABNORMAL

## 2020-03-16 ENCOUNTER — OFFICE VISIT (OUTPATIENT)
Dept: PRIMARY CARE CLINIC | Age: 35
End: 2020-03-16
Payer: MEDICAID

## 2020-03-16 VITALS
DIASTOLIC BLOOD PRESSURE: 88 MMHG | TEMPERATURE: 99.1 F | SYSTOLIC BLOOD PRESSURE: 132 MMHG | HEIGHT: 60 IN | WEIGHT: 72 LBS | BODY MASS INDEX: 14.14 KG/M2 | OXYGEN SATURATION: 99 % | HEART RATE: 122 BPM

## 2020-03-16 DIAGNOSIS — N30.01 ACUTE CYSTITIS WITH HEMATURIA: ICD-10-CM

## 2020-03-16 LAB
APPEARANCE FLUID: ABNORMAL
BILIRUBIN, POC: ABNORMAL
BLOOD URINE, POC: ABNORMAL
CLARITY, POC: ABNORMAL
COLOR, POC: YELLOW
GLUCOSE URINE, POC: ABNORMAL
KETONES, POC: ABNORMAL
LEUKOCYTE EST, POC: ABNORMAL
NITRITE, POC: ABNORMAL
PH, POC: 6.5
PROTEIN, POC: ABNORMAL
SPECIFIC GRAVITY, POC: 1.01
UROBILINOGEN, POC: 1

## 2020-03-16 PROCEDURE — 99214 OFFICE O/P EST MOD 30 MIN: CPT | Performed by: NURSE PRACTITIONER

## 2020-03-16 PROCEDURE — 81002 URINALYSIS NONAUTO W/O SCOPE: CPT | Performed by: NURSE PRACTITIONER

## 2020-03-16 RX ORDER — CIPROFLOXACIN 500 MG/5ML
500 KIT ORAL 2 TIMES DAILY
Qty: 100 ML | Refills: 0 | Status: SHIPPED | OUTPATIENT
Start: 2020-03-16 | End: 2020-08-27 | Stop reason: SDUPTHER

## 2020-03-16 ASSESSMENT — ENCOUNTER SYMPTOMS
WHEEZING: 0
STRIDOR: 0
SHORTNESS OF BREATH: 0
COUGH: 0
COLOR CHANGE: 1

## 2020-03-16 NOTE — PROGRESS NOTES
[address], [facility]     Phone:  [phone]  Fax:  [phone]      Anahy Moe is a 29 y.o. male who presents today for his medical conditions/complaints as noted below. Anahy Moe is c/o of Urinary Tract Infection (possible UTI yesterday morning) and Foot Swelling (swelling purple in color)      Chief Complaint   Patient presents with    Urinary Tract Infection     possible UTI yesterday morning    Foot Swelling     swelling purple in color       HPI:     HPI    Anahy Moe presents today for possible UTI. He recently was treated with macrobid for UTI last month. He states this cleared but he has been having frequency, cloudy/odorous urine for 2 days now. He denies fevers. His parents are with him today. He is also concerned about foot discoloration to right  foot. He states it has also been purple in color at times. This is not new. He has history of spina bifida. He has no feeling to his lower extremities. He is in a wheelchair. Mother states this gets better when he's lying in the floor. He goes to adult  and they were concerned it had worsened. Mother is unsure. Patient has no pain, no ulcers, open areas. He has seen a vascular specialist and they thought this was related to positioning.    Arterial scan, 2016:  Impression        Based on ankle brachial indices and doppler waveforms, the patient has    mildly diminished flow to the bilateral lower extremity arterial system at   [facility] on segmental pressures and doppler waveforms, the patient likely has    disease in the right tibial arterial segments.        Signature        ----------------------------------------------------------------    Electronically signed by Keith Villalba MD(Interpreting    physician) on 05/18/2016 12:04 PM         Past Medical History:   Diagnosis Date    Anxiety     Chronic kidney disease     kidney stones started at age 5, treated at [facility]       No results found for this visit on 03/16/20. Plan:     Cipro. F/u with urology is urine does not clear. Arterial scans of RLE. Return if symptoms worsen or fail to improve, for As scheduled. Orders Placed This Encounter   Procedures    Culture, Urine     Standing Status:   Future     Standing Expiration Date:   3/16/2021     Order Specific Question:   Specify (ex-cath, midstream, cysto, etc)? Answer:   self cath    VL LOWER EXTREMITY ARTERIAL SEGMENTAL PRESSURES W [facility]     Standing Status:   Future     Standing Expiration Date:   3/16/2021     Order Specific Question:   Reason for exam:     Answer:   Right foot discoloration, ab art studies in 2016    POCT Urinalysis no Micro       Orders Placed This Encounter   Medications    ciprofloxacin (CIPRO) 500 MG/5ML (10%) suspension     Sig: Take 5 mLs by mouth 2 times daily for 10 days     Dispense:  100 mL     Refill:  0        Patient offered educational materials - see patient instructions for any instruction needed. Discussed use, benefit, and side effects of prescribed medications. All patient questions answered. Instructed to continue current medications, diet and exercise. Patient agreed with treatment plan. Follow up as directed. Patient was advised to go to the ED if condition ever becomes emergent. EMR Dragon/transcription disclaimer: Some of this encounter note is an electronic transcription/translation of spoken language to printed text. The electronic translation of spoken language may permit erroneous, or at times, nonsensical words or phrases to be inadvertently transcribed.  Although I have reviewed the note for such errors, some may still exist.      Electronically signed by DEVON Albarran on 3/16/2020 at 12:06 PM

## 2020-03-18 ENCOUNTER — HOSPITAL ENCOUNTER (OUTPATIENT)
Dept: NON INVASIVE DIAGNOSTICS | Age: 35
Discharge: HOME | End: 2020-03-18
Payer: MEDICAID

## 2020-03-18 LAB
ORGANISM: ABNORMAL
URINE CULTURE, ROUTINE: ABNORMAL
URINE CULTURE, ROUTINE: ABNORMAL

## 2020-03-18 PROCEDURE — 93923 UPR/LXTR ART STDY 3+ LVLS: CPT

## 2020-03-20 ENCOUNTER — TELEPHONE (OUTPATIENT)
Dept: PRIMARY CARE CLINIC | Age: 35
End: 2020-03-20

## 2020-03-20 NOTE — TELEPHONE ENCOUNTER
----- Message from DEVON Rizvi sent at 3/19/2020  2:37 PM CDT -----  Please notify patient of normal result. Be sure to reposition often.  Also, urge them to get MyChart to do evisits, video visits, etc.

## 2020-03-20 NOTE — TELEPHONE ENCOUNTER
----- Message from DEVON Hollins sent at 3/19/2020  2:36 PM CDT -----  Please call patient with results. Continue cipro at this time. If this does not clear, we will add another antibiotic.  If you have severe problems, I recommend following up with urologist.

## 2020-06-16 DIAGNOSIS — E78.2 MIXED HYPERLIPIDEMIA: ICD-10-CM

## 2020-06-16 LAB
CHOLESTEROL, FASTING: 198 MG/DL (ref 160–199)
HDLC SERPL-MCNC: 50 MG/DL (ref 55–121)
LDL CHOLESTEROL CALCULATED: 140 MG/DL
TRIGLYCERIDE, FASTING: 42 MG/DL (ref 0–149)

## 2020-06-22 ENCOUNTER — TELEPHONE (OUTPATIENT)
Dept: PRIMARY CARE CLINIC | Age: 35
End: 2020-06-22

## 2020-06-22 ENCOUNTER — OFFICE VISIT (OUTPATIENT)
Dept: PRIMARY CARE CLINIC | Age: 35
End: 2020-06-22
Payer: MEDICAID

## 2020-06-22 VITALS
BODY MASS INDEX: 14.92 KG/M2 | SYSTOLIC BLOOD PRESSURE: 124 MMHG | RESPIRATION RATE: 20 BRPM | OXYGEN SATURATION: 97 % | WEIGHT: 76 LBS | HEIGHT: 60 IN | TEMPERATURE: 98.8 F | DIASTOLIC BLOOD PRESSURE: 82 MMHG | HEART RATE: 125 BPM

## 2020-06-22 PROCEDURE — 99214 OFFICE O/P EST MOD 30 MIN: CPT | Performed by: NURSE PRACTITIONER

## 2020-06-22 RX ORDER — PRAVASTATIN SODIUM 10 MG
10 TABLET ORAL DAILY
Qty: 30 TABLET | Refills: 1 | Status: SHIPPED | OUTPATIENT
Start: 2020-06-22 | End: 2020-07-16

## 2020-06-22 RX ORDER — POTASSIUM CITRATE 5 MEQ/1
TABLET, EXTENDED RELEASE ORAL
Qty: 180 TABLET | Refills: 5 | Status: SHIPPED | OUTPATIENT
Start: 2020-06-22 | End: 2021-08-03

## 2020-06-22 RX ORDER — POTASSIUM CITRATE MONOHYDRATE 100 %
5 POWDER (GRAM) MISCELLANEOUS DAILY
Qty: 100 G | Refills: 2 | Status: SHIPPED
Start: 2020-06-22 | End: 2020-06-22

## 2020-06-22 ASSESSMENT — ENCOUNTER SYMPTOMS
SINUS PRESSURE: 0
BACK PAIN: 0
EYE PAIN: 0
DIARRHEA: 0
WHEEZING: 0
COUGH: 0
SHORTNESS OF BREATH: 0
SINUS PAIN: 0
VOMITING: 0
NAUSEA: 0
ABDOMINAL PAIN: 0

## 2020-06-22 NOTE — PROGRESS NOTES
No respiratory distress. Breath sounds: Normal breath sounds. No stridor. No wheezing, rhonchi or rales. Abdominal:      General: Bowel sounds are normal. There is no distension. Palpations: Abdomen is soft. Tenderness: There is no abdominal tenderness. Musculoskeletal:      Lumbar back: He exhibits normal range of motion and no tenderness. Right lower leg: No edema. Left lower leg: No edema. Comments: In wheelchair, paraplegic   Lymphadenopathy:      Cervical: No cervical adenopathy. Skin:     General: Skin is warm and dry. Capillary Refill: Capillary refill takes less than 2 seconds. Findings: No rash. Neurological:      Mental Status: He is alert and oriented to person, place, and time. Mental status is at baseline. Motor: Weakness present. Psychiatric:         Mood and Affect: Mood normal.         Behavior: Behavior normal.         Thought Content: Thought content normal.         /82   Pulse 125   Temp 98.8 °F (37.1 °C) (Temporal)   Resp 20   Ht 4' (1.219 m)   Wt 76 lb (34.5 kg)   SpO2 97%   BMI 23.19 kg/m²     Assessment:      Diagnosis Orders   1. Mixed hyperlipidemia  Comprehensive Metabolic Panel   2. Essential hypertension  Comprehensive Metabolic Panel   3. Tachycardia     4. Anxiety     5. Hypokalemia  DISCONTINUED: Potassium Citrate POWD   6. Elevated lipoprotein(a)  pravastatin (PRAVACHOL) 10 MG tablet    Lipid, Fasting       No results found for this visit on 06/22/20. Plan:     Pravastatin ordered. Okay to change potassium citrate to powder. CMP and lipid panel in 6 months. Return in about 5 months (around 11/22/2020), or if symptoms worsen or fail to improve, for HTN, Anxiety, tachycardia, hyperlipidemia.     Orders Placed This Encounter   Procedures    Comprehensive Metabolic Panel     Standing Status:   Future     Standing Expiration Date:   12/22/2021    Lipid, Fasting     Standing Status:   Future     Standing

## 2020-08-27 ENCOUNTER — VIRTUAL VISIT (OUTPATIENT)
Dept: PRIMARY CARE CLINIC | Age: 35
End: 2020-08-27
Payer: MEDICAID

## 2020-08-27 PROCEDURE — 99442 PR PHYS/QHP TELEPHONE EVALUATION 11-20 MIN: CPT | Performed by: NURSE PRACTITIONER

## 2020-08-27 RX ORDER — CIPROFLOXACIN 500 MG/5ML
500 KIT ORAL 2 TIMES DAILY
Qty: 100 ML | Refills: 0 | Status: SHIPPED | OUTPATIENT
Start: 2020-08-27 | End: 2020-09-06

## 2020-08-27 ASSESSMENT — ENCOUNTER SYMPTOMS
GASTROINTESTINAL NEGATIVE: 1
RESPIRATORY NEGATIVE: 1

## 2020-08-27 NOTE — PROGRESS NOTES
[address] [address]            Phone:  [phone]  Fax:  [phone]    Daryl Shook is a 28 y.o. male evaluated via telephone on 8/27/2020. Consent:    He and/or health care decision maker is aware that that he may receive a bill for this telephone service, depending on his insurance coverage, and has provided verbal consent to proceed: Yes      HPI  Chief Complaint   Patient presents with    Urinary Tract Infection       I communicated with the patient and/or health care decision maker about possible UTI. He started having cloudy urine on Tuesday. He self caths to urinate. He does have frequent UTIs. He does see urology at [facility]. He has been afebrile. Other symptoms: dark urine. He has no feeling from the waist down so he cannot tell if he has dysuria. He is drinking plenty of water and has not changed his diet. Review of Systems   Constitutional: Negative for fatigue and fever. HENT: Negative. Respiratory: Negative. Cardiovascular: Negative. Gastrointestinal: Negative. Genitourinary: Negative for decreased urine volume, discharge, dysuria, flank pain, frequency, hematuria, penile pain, penile swelling, scrotal swelling, testicular pain and urgency. Cloudy urine, dark urine         PLAN    Details of this discussion including any medical advice provided:    1. Acute cystitis with hematuria    Start Cipro    F/U urology if symptoms do not clear. Return if symptoms worsen or fail to improve. I affirm this is a Patient Initiated Episode with an Established Patient who has not had a related appointment within my department in the past 7 days or scheduled within the next 24 hours.       Total Time: minutes: 11-20 minutes      Note: not billable if this call serves to triage the patient into an appointment for the relevant concern      Enzo Vega         Pursuant to the emergency declaration under the [address] [facility], [address] waiver authority and the [employer] Act, this Virtual  Visit was conducted, with patient's consent, to reduce the patient's risk of exposure to COVID-19 and provide continuity of care for an established patient. Services were provided through a telephone discussion  to substitute for in-person clinic visit. Parties involved during telephone call include myself, DEVON Prasad and patient listed.

## 2020-08-28 ENCOUNTER — TELEPHONE (OUTPATIENT)
Dept: PRIMARY CARE CLINIC | Age: 35
End: 2020-08-28

## 2020-08-28 RX ORDER — CIPROFLOXACIN 500 MG/1
500 TABLET, FILM COATED ORAL 2 TIMES DAILY
Qty: 20 TABLET | Refills: 0 | Status: SHIPPED | OUTPATIENT
Start: 2020-08-28 | End: 2020-11-09 | Stop reason: SDUPTHER

## 2020-08-28 NOTE — TELEPHONE ENCOUNTER
Patients mom called with request to change cipro to tablet.  \"We can't get the liquid and last time we had to crush the tablet\"  Rx escribed

## 2020-10-05 NOTE — TELEPHONE ENCOUNTER
Varsha Medina called to request a refill on his medication.       Last office visit : 8/27/2020   Next office visit : 11/16/2020     Requested Prescriptions     Signed Prescriptions Disp Refills    Catheters MISC 180 each 3     Sig: CATHETER 6 TIMES DAILY IN & OUT *Q05.9*     Authorizing Provider: Rachel Matthews     Ordering User: Nancy Taylor

## 2020-10-23 ENCOUNTER — OFFICE VISIT (OUTPATIENT)
Dept: PRIMARY CARE CLINIC | Age: 35
End: 2020-10-23
Payer: MEDICAID

## 2020-10-23 VITALS
HEART RATE: 122 BPM | HEIGHT: 60 IN | OXYGEN SATURATION: 98 % | WEIGHT: 69 LBS | TEMPERATURE: 98.3 F | DIASTOLIC BLOOD PRESSURE: 86 MMHG | BODY MASS INDEX: 13.55 KG/M2 | SYSTOLIC BLOOD PRESSURE: 118 MMHG

## 2020-10-23 DIAGNOSIS — N30.01 ACUTE CYSTITIS WITH HEMATURIA: ICD-10-CM

## 2020-10-23 DIAGNOSIS — N39.0 RECURRENT UTI: ICD-10-CM

## 2020-10-23 LAB
APPEARANCE FLUID: CLEAR
BILIRUBIN, POC: ABNORMAL
BLOOD URINE, POC: ABNORMAL
CLARITY, POC: CLEAR
COLOR, POC: YELLOW
GLUCOSE URINE, POC: ABNORMAL
KETONES, POC: ABNORMAL
LEUKOCYTE EST, POC: ABNORMAL
NITRITE, POC: ABNORMAL
PH, POC: 6.5
PROTEIN, POC: ABNORMAL
SPECIFIC GRAVITY, POC: 1.01
UROBILINOGEN, POC: 1

## 2020-10-23 PROCEDURE — 90471 IMMUNIZATION ADMIN: CPT | Performed by: NURSE PRACTITIONER

## 2020-10-23 PROCEDURE — 81002 URINALYSIS NONAUTO W/O SCOPE: CPT | Performed by: NURSE PRACTITIONER

## 2020-10-23 PROCEDURE — 90686 IIV4 VACC NO PRSV 0.5 ML IM: CPT | Performed by: NURSE PRACTITIONER

## 2020-10-23 PROCEDURE — 99214 OFFICE O/P EST MOD 30 MIN: CPT | Performed by: NURSE PRACTITIONER

## 2020-10-23 RX ORDER — OMEGA-3S/DHA/EPA/FISH OIL/D3 1150-1000
1 LIQUID (ML) ORAL DAILY
Qty: 1 BOTTLE | Refills: 2 | Status: SHIPPED | OUTPATIENT
Start: 2020-10-23 | End: 2021-05-10

## 2020-10-23 RX ORDER — AMOXICILLIN AND CLAVULANATE POTASSIUM 400; 57 MG/5ML; MG/5ML
41 POWDER, FOR SUSPENSION ORAL 2 TIMES DAILY
Qty: 160 ML | Refills: 0 | Status: SHIPPED | OUTPATIENT
Start: 2020-10-23 | End: 2020-11-02

## 2020-10-23 NOTE — PROGRESS NOTES
(OMEGA ESSENTIALS BASIC) LIQD Take 1 Units by mouth daily 1 Bottle 2    Catheters MISC CATHETER 6 TIMES DAILY IN & OUT *Q05.9* 180 each 3    potassium citrate (UROCIT-K) 5 MEQ (540 MG) extended release tablet TAKE (2) TABLETS BY MOUTH THREE TIMES DAILY WITH MEALS. 180 tablet 5    busPIRone (BUSPAR) 5 MG tablet TAKE 1 TABLET TWICE DAILY AS NEEDED FOR ANXIETY 60 tablet 11    lisinopril (PRINIVIL;ZESTRIL) 5 MG tablet TAKE (1) TABLET DAILY FOR BLOOD PRESSURE. 30 tablet 11    solifenacin (VESICARE) 5 MG tablet Take 1 tablet by mouth daily Take 5 mg by mouth 30 tablet 11    menthol-zinc oxide (CALMOSEPTINE) 0.44-20.6 % OINT ointment Apply topically daily Max 30 ml per day.  Blood Pressure Monitoring [address] Disabled patient who doesn't drive needs BP monitor for home for parents to check it for him 1 Device 0    aspirin 81 MG chewable tablet Take 81 mg by mouth daily. No current facility-administered medications for this visit. Allergies   Allergen Reactions    Latex Swelling and Rash     Had reaction at dentist office-redness, swelling, and rash  Other reaction(s): rash  Had reaction at dentist office-redness, swelling, and rash    Ceftriaxone Other (See Comments)     Other reaction(s): Other (see comments)  Fever, hallucinations was transported via ambulance [address]   Other reaction(s): high fever  Fever, hallucinations was transported via ambulance [place]     Pravastatin      Myalgias    Vancomycin Other (See Comments)     Other reaction(s):  Other (See Comments)  Red warm hands and itching  Red warm hands and itching       Family History   Problem Relation Age of Onset    Colon Cancer Mother 54        In remission    High Cholesterol Mother     High Blood Pressure Mother     Thyroid Disease Mother         Hypothyroidism    Coronary Art Dis Maternal Grandmother         CABG x 2    Heart Attack Maternal Grandmother     High Blood Pressure Maternal Grandmother     Cancer Maternal Grandmother         Pancreatic Cancer    Heart Failure Maternal Grandfather          in 2012    Immunodeficiency Neg Hx                Review of Systems   Constitutional: Negative for fever. Genitourinary: Negative for frequency and hematuria. Cloudy and foul smelling urine   Musculoskeletal: Positive for myalgias. Objective:     Physical Exam  Vitals signs and nursing note reviewed. Constitutional:       General: He is not in acute distress. Appearance: He is well-developed. He is not ill-appearing. HENT:      Head: Normocephalic and atraumatic. Right Ear: External ear normal.      Left Ear: External ear normal.      Nose: Nose normal. No rhinorrhea. Eyes:      General:         Right eye: No discharge. Left eye: No discharge. Conjunctiva/sclera: Conjunctivae normal.   Neck:      Musculoskeletal: Normal range of motion and neck supple. Cardiovascular:      Rate and Rhythm: Regular rhythm. Tachycardia present. Pulses: Normal pulses. Heart sounds: Normal heart sounds. Pulmonary:      Effort: Pulmonary effort is normal. No respiratory distress. Breath sounds: Normal breath sounds. No stridor. No wheezing, rhonchi or rales. Abdominal:      General: Bowel sounds are normal. There is no distension. Palpations: Abdomen is soft. Tenderness: There is no abdominal tenderness. Musculoskeletal:      Lumbar back: He exhibits decreased range of motion. Right lower leg: No edema. Left lower leg: No edema. Comments: In wheelchair, lower paralysis    Skin:     General: Skin is warm and dry. Capillary Refill: Capillary refill takes less than 2 seconds. Findings: No rash. Neurological:      Mental Status: He is alert and oriented to person, place, and time. Mental status is at baseline. Psychiatric:         Mood and Affect: Mood normal.         Behavior: Behavior normal.         Thought Content:  Thought content normal. /86   Pulse 122   Temp 98.3 °F (36.8 °C) (Temporal)   Ht 4' (1.219 m)   Wt 69 lb (31.3 kg)   SpO2 98%   BMI 21.06 kg/m²     Assessment:      Diagnosis Orders   1. Acute cystitis with hematuria  POCT Urinalysis no Micro    Culture, Urine    amoxicillin-clavulanate (AUGMENTIN) 400-57 MG/5ML suspension   2. Recurrent UTI  POCT Urinalysis no Micro    Culture, Urine    amoxicillin-clavulanate (AUGMENTIN) 400-57 MG/5ML suspension   3. Need for influenza vaccination  INFLUENZA, QUADV, 3 YRS AND OLDER, IM PF, PREFILL SYR OR SDV, 0.5ML (AFLURIA QUADV, PF)   4. Elevated lipoprotein(a)         No results found for this visit on 10/23/20. Plan:     Labs before next visit, ok to move for 90 days. UA pos. Send for culture. Start Augmentin, he states he tolerates Amoxicillin. He has this when he goes to the dentist. He most recently had this last year for dental work. F/u with urology. Start fish oil. He is unable to tolerate pills. Must be liquid. Flu vaccine    Return in about 3 months (around 1/23/2021), or if symptoms worsen or fail to improve, for hyperlipidemia, HTN. Orders Placed This Encounter   Procedures    Culture, Urine     Standing Status:   Future     Standing Expiration Date:   10/23/2021     Order Specific Question:   Specify (ex-cath, midstream, cysto, etc)? Answer:   midstream    INFLUENZA, QUADV, 3 YRS AND OLDER, IM PF, PREFILL SYR OR SDV, 0.5ML (AFLURIA QUADV, PF)    POCT Urinalysis no Micro       Orders Placed This Encounter   Medications    amoxicillin-clavulanate (AUGMENTIN) 400-57 MG/5ML suspension     Sig: Take 8 mLs by mouth 2 times daily for 10 days     Dispense:  160 mL     Refill:  0    Omega-3 Fatty Acids (OMEGA ESSENTIALS BASIC) LIQD     Sig: Take 1 Units by mouth daily     Dispense:  1 Bottle     Refill:  2        Patient offered educational materials - see patient instructions for any instruction needed.   Discussed use, benefit, and side effects of prescribed medications. All patient questions answered. Instructed to continue current medications, diet and exercise. Patient agreed with treatment plan. Follow up as directed. Patient was advised to go to the ED if condition ever becomes emergent. EMR Dragon/transcription disclaimer: Some of this encounter note is an electronic transcription/translation of spoken language to printed text. The electronic translation of spoken language may permit erroneous, or at times, nonsensical words or phrases to be inadvertently transcribed.  Although I have reviewed the note for such errors, some may still exist.      Electronically signed by DEVON Jurado on 10/23/2020 at 10:34 AM

## 2020-10-25 LAB
ORGANISM: ABNORMAL
URINE CULTURE, ROUTINE: ABNORMAL
URINE CULTURE, ROUTINE: ABNORMAL

## 2020-11-09 ENCOUNTER — TELEPHONE (OUTPATIENT)
Dept: PRIMARY CARE CLINIC | Age: 35
End: 2020-11-09

## 2020-11-09 RX ORDER — CIPROFLOXACIN 500 MG/1
500 TABLET, FILM COATED ORAL 2 TIMES DAILY
Qty: 20 TABLET | Refills: 0 | Status: SHIPPED | OUTPATIENT
Start: 2020-11-09 | End: 2020-11-19

## 2020-11-09 RX ORDER — CIPROFLOXACIN 500 MG/5ML
500 KIT ORAL 2 TIMES DAILY
Qty: 100 ML | Refills: 0 | Status: CANCELLED | OUTPATIENT
Start: 2020-11-09 | End: 2020-11-19

## 2020-11-09 NOTE — TELEPHONE ENCOUNTER
[facility]ed [facility] and [facility]-no suspension available  Discussed with mom and she crushes the cipro  Discussed with [facility] [place] approval sent to pharmacy

## 2020-11-09 NOTE — PROGRESS NOTES
We can do the cipro suspension. Can we call around and see where he can get this medication? I know that [facility] did not have it last time? I have pended this to be sent to pharmacy that has it available.

## 2020-11-09 NOTE — TELEPHONE ENCOUNTER
Pt's mother called he has started running a temp and his urine is foggy again. She states he was on amoxicillin until last Sunday and 6 days later started getting foggy again she states he cath'd at 930 this morning. She states he will be going to [facility] the day before thanksgiving he will be having some test due to all the UTI's but they are trying to keep him clear until then. She is very concerned and didn't know if something can be called in or if paige PAYTON Thought he needed to be seen . . please advise

## 2020-11-26 ENCOUNTER — TRANSCRIBE ORDERS (OUTPATIENT)
Dept: ADMINISTRATIVE | Facility: HOSPITAL | Age: 35
End: 2020-11-26

## 2020-11-26 DIAGNOSIS — N13.30 HYDRONEPHROSIS, UNSPECIFIED HYDRONEPHROSIS TYPE: Primary | ICD-10-CM

## 2020-12-15 ENCOUNTER — HOSPITAL ENCOUNTER (OUTPATIENT)
Dept: CT IMAGING | Facility: HOSPITAL | Age: 35
Discharge: HOME | End: 2020-12-15
Admitting: UROLOGY

## 2020-12-15 DIAGNOSIS — N13.30 HYDRONEPHROSIS, UNSPECIFIED HYDRONEPHROSIS TYPE: ICD-10-CM

## 2020-12-15 PROCEDURE — 74178 CT ABD&PLV WO CNTR FLWD CNTR: CPT

## 2020-12-15 PROCEDURE — 0 IOPAMIDOL PER 1 ML: Performed by: UROLOGY

## 2020-12-15 RX ADMIN — IOPAMIDOL 100 ML: 755 INJECTION, SOLUTION INTRAVENOUS at 09:15

## 2021-01-27 DIAGNOSIS — E78.41 ELEVATED LIPOPROTEIN(A): ICD-10-CM

## 2021-01-27 DIAGNOSIS — I10 ESSENTIAL HYPERTENSION: ICD-10-CM

## 2021-01-27 DIAGNOSIS — E78.2 MIXED HYPERLIPIDEMIA: ICD-10-CM

## 2021-01-27 LAB
ALBUMIN SERPL-MCNC: 4.4 G/DL (ref 3.5–5.2)
ALP BLD-CCNC: 74 U/L (ref 40–130)
ALT SERPL-CCNC: 14 U/L (ref 5–41)
ANION GAP SERPL CALCULATED.3IONS-SCNC: 9 MMOL/L (ref 7–19)
AST SERPL-CCNC: 17 U/L (ref 5–40)
BILIRUB SERPL-MCNC: 0.7 MG/DL (ref 0.2–1.2)
BUN BLDV-MCNC: 10 MG/DL (ref 6–20)
CALCIUM SERPL-MCNC: 9.3 MG/DL (ref 8.6–10)
CHLORIDE BLD-SCNC: 102 MMOL/L (ref 98–111)
CHOLESTEROL, FASTING: 200 MG/DL (ref 160–199)
CO2: 30 MMOL/L (ref 22–29)
CREAT SERPL-MCNC: 0.4 MG/DL (ref 0.5–1.2)
GFR AFRICAN AMERICAN: >59
GFR NON-AFRICAN AMERICAN: >60
GLUCOSE BLD-MCNC: 87 MG/DL (ref 74–109)
HDLC SERPL-MCNC: 50 MG/DL (ref 55–121)
LDL CHOLESTEROL CALCULATED: 138 MG/DL
POTASSIUM SERPL-SCNC: 4.6 MMOL/L (ref 3.5–5)
SODIUM BLD-SCNC: 141 MMOL/L (ref 136–145)
TOTAL PROTEIN: 7 G/DL (ref 6.6–8.7)
TRIGLYCERIDE, FASTING: 62 MG/DL (ref 0–149)

## 2021-02-02 ENCOUNTER — OFFICE VISIT (OUTPATIENT)
Dept: PRIMARY CARE CLINIC | Age: 36
End: 2021-02-02
Payer: MEDICAID

## 2021-02-02 VITALS
OXYGEN SATURATION: 97 % | HEART RATE: 111 BPM | WEIGHT: 71.4 LBS | RESPIRATION RATE: 18 BRPM | HEIGHT: 60 IN | DIASTOLIC BLOOD PRESSURE: 76 MMHG | TEMPERATURE: 98.4 F | BODY MASS INDEX: 14.02 KG/M2 | SYSTOLIC BLOOD PRESSURE: 120 MMHG

## 2021-02-02 DIAGNOSIS — I10 ESSENTIAL HYPERTENSION: ICD-10-CM

## 2021-02-02 DIAGNOSIS — E78.2 MIXED HYPERLIPIDEMIA: ICD-10-CM

## 2021-02-02 DIAGNOSIS — Q05.9 SPINA BIFIDA, UNSPECIFIED HYDROCEPHALUS PRESENCE, UNSPECIFIED SPINAL REGION (HCC): ICD-10-CM

## 2021-02-02 DIAGNOSIS — L98.9 LEG SKIN LESION, LEFT: Primary | ICD-10-CM

## 2021-02-02 DIAGNOSIS — F41.9 ANXIETY: ICD-10-CM

## 2021-02-02 PROCEDURE — 99214 OFFICE O/P EST MOD 30 MIN: CPT | Performed by: NURSE PRACTITIONER

## 2021-02-02 RX ORDER — CRANBERRY FRUIT EXTRACT 200 MG
1 CAPSULE ORAL DAILY
COMMUNITY

## 2021-02-02 RX ORDER — SOLIFENACIN SUCCINATE 5 MG/1
7.5 TABLET, FILM COATED ORAL DAILY
Qty: 45 TABLET | Refills: 11 | Status: SHIPPED | OUTPATIENT
Start: 2021-02-02 | End: 2021-04-05 | Stop reason: SDUPTHER

## 2021-02-02 RX ORDER — LISINOPRIL 5 MG/1
TABLET ORAL
Qty: 30 TABLET | Refills: 11 | Status: SHIPPED | OUTPATIENT
Start: 2021-02-02 | End: 2022-02-07

## 2021-02-02 SDOH — ECONOMIC STABILITY: INCOME INSECURITY: HOW HARD IS IT FOR YOU TO PAY FOR THE VERY BASICS LIKE FOOD, HOUSING, MEDICAL CARE, AND HEATING?: NOT HARD AT ALL

## 2021-02-02 SDOH — ECONOMIC STABILITY: TRANSPORTATION INSECURITY
IN THE PAST 12 MONTHS, HAS THE LACK OF TRANSPORTATION KEPT YOU FROM MEDICAL APPOINTMENTS OR FROM GETTING MEDICATIONS?: NO

## 2021-02-02 SDOH — ECONOMIC STABILITY: FOOD INSECURITY: WITHIN THE PAST 12 MONTHS, THE FOOD YOU BOUGHT JUST DIDN'T LAST AND YOU DIDN'T HAVE MONEY TO GET MORE.: NEVER TRUE

## 2021-02-02 ASSESSMENT — PATIENT HEALTH QUESTIONNAIRE - PHQ9
SUM OF ALL RESPONSES TO PHQ QUESTIONS 1-9: 0
2. FEELING DOWN, DEPRESSED OR HOPELESS: 0

## 2021-02-02 ASSESSMENT — ENCOUNTER SYMPTOMS
GASTROINTESTINAL NEGATIVE: 1
RESPIRATORY NEGATIVE: 1
EYES NEGATIVE: 1

## 2021-02-02 NOTE — PROGRESS NOTES
LITHOTRIPSY      VENTRICULOPERITONEAL SHUNT      with revisions        Social History     Tobacco Use    Smoking status: Never Smoker    Smokeless tobacco: Never Used   Substance Use Topics    Alcohol use: No        Current Outpatient Medications   Medication Sig Dispense Refill    Cranberry 200 MG CAPS Take 1 capsule by mouth daily      lisinopril (PRINIVIL;ZESTRIL) 5 MG tablet TAKE (1) TABLET DAILY FOR BLOOD PRESSURE. 30 tablet 11    Catheters MISC CATHETER 6 TIMES DAILY IN & OUT *Q05.9* 180 each 3    solifenacin (VESICARE) 5 MG tablet Take 1.5 tablets by mouth daily Take 5 mg by mouth 45 tablet 11    Omega-3 Fatty Acids (OMEGA ESSENTIALS BASIC) LIQD Take 1 Units by mouth daily 1 Bottle 2    potassium citrate (UROCIT-K) 5 MEQ (540 MG) extended release tablet TAKE (2) TABLETS BY MOUTH THREE TIMES DAILY WITH MEALS. 180 tablet 5    busPIRone (BUSPAR) 5 MG tablet TAKE 1 TABLET TWICE DAILY AS NEEDED FOR ANXIETY 60 tablet 11    menthol-zinc oxide (CALMOSEPTINE) 0.44-20.6 % OINT ointment Apply topically daily Max 30 ml per day.  Blood Pressure Monitoring [address] Disabled patient who doesn't drive needs BP monitor for home for parents to check it for him 1 Device 0    aspirin 81 MG chewable tablet Take 81 mg by mouth daily. No current facility-administered medications for this visit. Allergies   Allergen Reactions    Latex Swelling and Rash     Had reaction at dentist office-redness, swelling, and rash  Other reaction(s): rash  Had reaction at dentist office-redness, swelling, and rash    Ceftriaxone Other (See Comments)     Other reaction(s): Other (see comments)  Fever, hallucinations was transported via ambulance [address]   Other reaction(s): high fever  Fever, hallucinations was transported via ambulance [place]     Pravastatin      Myalgias    Vancomycin Other (See Comments)     Other reaction(s):  Other (See Comments)  Red warm hands and itching  Red warm hands and itching       Family History   Problem Relation Age of Onset    Colon Cancer Mother 54        In remission    High Cholesterol Mother     High Blood Pressure Mother     Thyroid Disease Mother         Hypothyroidism    Coronary Art Dis Maternal Grandmother         CABG x 2    Heart Attack Maternal Grandmother     High Blood Pressure Maternal Grandmother     Cancer Maternal Grandmother         Pancreatic Cancer    Heart Failure Maternal Grandfather          in     Immunodeficiency Neg Hx                Subjective:      Review of Systems   Constitutional: Negative. HENT: Negative. Eyes: Negative. Respiratory: Negative. Cardiovascular: Negative. Gastrointestinal: Negative. Endocrine: Negative. Genitourinary: Negative. Musculoskeletal: Negative. Skin: Negative. Neurological: Negative. Hematological: Negative. Psychiatric/Behavioral: Negative. Objective:     Physical Exam  Constitutional:       Appearance: Normal appearance. HENT:      Head: Normocephalic and atraumatic. Nose: Nose normal.   Eyes:      Extraocular Movements: Extraocular movements intact. Conjunctiva/sclera: Conjunctivae normal.      Pupils: Pupils are equal, round, and reactive to light. Neck:      Musculoskeletal: Normal range of motion. Cardiovascular:      Rate and Rhythm: Normal rate and regular rhythm. Pulmonary:      Effort: Pulmonary effort is normal.      Breath sounds: Normal breath sounds. Abdominal:      General: Abdomen is flat. Bowel sounds are normal.   Musculoskeletal: Normal range of motion. Thoracic back: He exhibits deformity. Lumbar back: He exhibits deformity. Legs:       Comments: wheelchair   Skin:     General: Skin is warm and dry. Capillary Refill: Capillary refill takes less than 2 seconds. Neurological:      General: No focal deficit present. Mental Status: He is alert and oriented to person, place, and time. Mental status is at baseline. Psychiatric:         Mood and Affect: Mood normal.         Behavior: Behavior normal.         Thought Content: Thought content normal.         Judgment: Judgment normal.         /76   Pulse 111   Temp 98.4 °F (36.9 °C) (Temporal)   Resp 18   Ht 4' (1.219 m)   Wt 71 lb 6.4 oz (32.4 kg)   SpO2 97%   BMI 21.79 kg/m²     Assessment:      Diagnosis Orders   1. Leg skin lesion, left  External Referral To Dermatology   2. Essential hypertension  lisinopril (PRINIVIL;ZESTRIL) 5 MG tablet    CBC Auto Differential    Comprehensive Metabolic Panel    TSH WITH REFLEX TO FT4    Hemoglobin A1C   3. Spina bifida, unspecified hydrocephalus presence, unspecified spinal region [facility])  Catheters MISC   4. Mixed hyperlipidemia  Lipid, Fasting   5. Anxiety  TSH WITH REFLEX TO FT4    Hemoglobin A1C       No results found for this visit on 02/02/21. Plan:     I am checking labs prior to next appointment. If cholesterol is still slightly elevated we will plan to start Zetia daily as discussed. Refill on other medications as needed. Referral to dermatology due to skin lesions noted on his left knee.     Return in about 6 months (around 8/2/2021) for Annual Physical Exam.    Orders Placed This Encounter   Procedures    CBC Auto Differential     Standing Status:   Future     Standing Expiration Date:   2/2/2022    Comprehensive Metabolic Panel     Standing Status:   Future     Standing Expiration Date:   2/2/2022    TSH WITH REFLEX TO FT4     Standing Status:   Future     Standing Expiration Date:   2/2/2022    Hemoglobin A1C     Standing Status:   Future     Standing Expiration Date:   2/2/2022    Lipid, Fasting     Standing Status:   Future     Standing Expiration Date:   2/2/2022   [employer] External Referral To Dermatology     Referral Priority:   Routine     Referral Type:   Eval and Treat     Referral Reason:   Specialty Services Required     Requested Specialty:   Dermatology     Number of Visits Requested:   1 Orders Placed This Encounter   Medications    lisinopril (PRINIVIL;ZESTRIL) 5 MG tablet     Sig: TAKE (1) TABLET DAILY FOR BLOOD PRESSURE. Dispense:  30 tablet     Refill:  11    Catheters MISC     Sig: CATHETER 6 TIMES DAILY IN & OUT *Q05.9*     Dispense:  180 each     Refill:  3    solifenacin (VESICARE) 5 MG tablet     Sig: Take 1.5 tablets by mouth daily Take 5 mg by mouth     Dispense:  45 tablet     Refill:  11        Patient offered educational handouts and has had all questions answered. Patient voices understanding and agrees to plans along with risks and benefits of plan. Patient is instructed to continue prior meds, diet, and exercise plans as instructed. Patient agrees to follow up as instructed and sooner if needed. Patient agrees to go to ER if condition becomes emergent. EMR Dragon/transcription disclaimer: Some of this encounter note is an electronic transcription/translation of spoken language to printed text. The electronic translation of spoken language may permit erroneous, or at times, nonsensical words or phrases to be inadvertently transcribed.  Although I have reviewed the note for such errors, some may still exist.    Electronically signed by DEVON Fink on 2/2/2021 at 1:54 PM

## 2021-03-08 ENCOUNTER — VIRTUAL VISIT (OUTPATIENT)
Dept: PRIMARY CARE CLINIC | Age: 36
End: 2021-03-08
Payer: MEDICAID

## 2021-03-08 DIAGNOSIS — N30.01 ACUTE CYSTITIS WITH HEMATURIA: Primary | ICD-10-CM

## 2021-03-08 DIAGNOSIS — N30.01 ACUTE CYSTITIS WITH HEMATURIA: ICD-10-CM

## 2021-03-08 DIAGNOSIS — R30.0 DYSURIA: ICD-10-CM

## 2021-03-08 LAB
APPEARANCE FLUID: ABNORMAL
BILIRUBIN, POC: ABNORMAL
BLOOD URINE, POC: ABNORMAL
CLARITY, POC: ABNORMAL
COLOR, POC: YELLOW
GLUCOSE URINE, POC: ABNORMAL
KETONES, POC: ABNORMAL
LEUKOCYTE EST, POC: ABNORMAL
NITRITE, POC: ABNORMAL
PH, POC: 5
PROTEIN, POC: ABNORMAL
SPECIFIC GRAVITY, POC: 1
UROBILINOGEN, POC: 0.2

## 2021-03-08 PROCEDURE — 99443 PR PHYS/QHP TELEPHONE EVALUATION 21-30 MIN: CPT | Performed by: NURSE PRACTITIONER

## 2021-03-08 PROCEDURE — 81002 URINALYSIS NONAUTO W/O SCOPE: CPT | Performed by: NURSE PRACTITIONER

## 2021-03-08 RX ORDER — CIPROFLOXACIN 500 MG/1
500 TABLET, FILM COATED ORAL 2 TIMES DAILY
Qty: 20 TABLET | Refills: 0 | Status: SHIPPED | OUTPATIENT
Start: 2021-03-08 | End: 2021-03-18

## 2021-03-08 ASSESSMENT — ENCOUNTER SYMPTOMS
COUGH: 0
VOICE CHANGE: 0
RHINORRHEA: 0
BACK PAIN: 0
PHOTOPHOBIA: 0
VOMITING: 0
COLOR CHANGE: 0
SHORTNESS OF BREATH: 0
NAUSEA: 0

## 2021-03-08 NOTE — PROGRESS NOTES
[address] [address]            Phone:  [phone]  Fax:  [phone]    Madelyn Johnson is a 28 y.o. male evaluated via telephone on 3/8/2021. Consent:    He and/or health care decision maker is aware that that he may receive a bill for this telephone service, depending on his insurance coverage, and has provided verbal consent to proceed: Yes      HPI  Chief Complaint   Patient presents with    Dysuria       Patient presents today for evaluation of dysuria. Patient self caths 5x per day and has noted urine to be cloudy recently. No fever or body aches. He has a urologist in [place] he sees. He brought in urine specimen this morning. Review of Systems   Constitutional: Negative for chills and fever. HENT: Negative for ear pain, hearing loss, rhinorrhea and voice change. Eyes: Negative for photophobia and visual disturbance. Respiratory: Negative for cough and shortness of breath. Cardiovascular: Negative for chest pain and palpitations. Gastrointestinal: Negative for nausea and vomiting. Endocrine: Negative. Negative for cold intolerance and heat intolerance. Genitourinary: Positive for dysuria and hematuria. Negative for difficulty urinating and flank pain. Musculoskeletal: Negative for back pain and neck pain. Skin: Negative for color change and rash. Allergic/Immunologic: Negative for environmental allergies and food allergies. Neurological: Negative for dizziness, speech difficulty and headaches. Hematological: Does not bruise/bleed easily. Psychiatric/Behavioral: Negative for sleep disturbance and suicidal ideas. Patient location: home    PLAN    Details of this discussion including any medical advice provided: \    1. Acute cystitis with hematuria  \  - POCT Urinalysis no Micro  - Culture, Urine; Future  - ciprofloxacin (CIPRO) 500 MG tablet;  Take 1 tablet by mouth 2 times daily for 10 days  Dispense: 20 tablet; Refill: 0    2. Dysuria  \  - POCT Urinalysis no Micro  - Culture, Urine; Future     No results found for this visit on 03/08/21. I affirm this is a Patient Initiated Episode with an Established Patient who has not had a related appointment within my department in the past 7 days or scheduled within the next 24 hours. Total Time: minutes: 21-30 minutes      Note: not billable if this call serves to triage the patient into an appointment for the relevant concern      [address] to the emergency declaration under the [facility], [facility] waiver authority and the [employer] Act, this Virtual  Visit was conducted, with patient's consent, to reduce the patient's risk of exposure to COVID-19 and provide continuity of care for an established patient.

## 2021-03-10 ENCOUNTER — TELEPHONE (OUTPATIENT)
Dept: PRIMARY CARE CLINIC | Age: 36
End: 2021-03-10

## 2021-03-10 LAB
ORGANISM: ABNORMAL
URINE CULTURE, ROUTINE: ABNORMAL
URINE CULTURE, ROUTINE: ABNORMAL

## 2021-03-12 ENCOUNTER — TELEPHONE (OUTPATIENT)
Dept: PRIMARY CARE CLINIC | Age: 36
End: 2021-03-12

## 2021-03-12 RX ORDER — SULFAMETHOXAZOLE AND TRIMETHOPRIM 800; 160 MG/1; MG/1
1 TABLET ORAL 2 TIMES DAILY
Qty: 20 TABLET | Refills: 0 | Status: SHIPPED | OUTPATIENT
Start: 2021-03-12 | End: 2021-03-22

## 2021-04-05 RX ORDER — SOLIFENACIN SUCCINATE 5 MG/1
10 TABLET, FILM COATED ORAL DAILY
Qty: 45 TABLET | Refills: 11 | Status: SHIPPED | OUTPATIENT
Start: 2021-04-05 | End: 2022-08-12 | Stop reason: SDUPTHER

## 2021-04-08 DIAGNOSIS — N32.81 OVERACTIVE BLADDER: Primary | ICD-10-CM

## 2021-04-08 RX ORDER — SOLIFENACIN SUCCINATE 10 MG/1
10 TABLET, FILM COATED ORAL DAILY
Qty: 30 TABLET | Refills: 3 | Status: SHIPPED | OUTPATIENT
Start: 2021-04-08 | End: 2021-08-23

## 2021-04-08 NOTE — TELEPHONE ENCOUNTER
Per [facility] will not cover 5mg BID only 10mg qd. I have sent in new order for 10mg qd. Jere Ventura called to request a refill on his medication.       Last office visit : 3/8/2021   Next office visit : 8/3/2021     Requested Prescriptions     Pending Prescriptions Disp Refills    solifenacin (VESICARE) 10 MG tablet 30 tablet 3     Sig: Take 1 tablet by mouth daily            Justa Cesar

## 2021-05-10 RX ORDER — OMEGA-3/DHA/EPA/FISH OIL 1600MG/5ML
LIQUID (ML) ORAL
Qty: 200 ML | Refills: 0 | Status: SHIPPED | OUTPATIENT
Start: 2021-05-10 | End: 2021-06-24

## 2021-06-24 RX ORDER — OMEGA-3/DHA/EPA/FISH OIL 1600MG/5ML
LIQUID (ML) ORAL
Qty: 200 ML | Refills: 0 | Status: SHIPPED | OUTPATIENT
Start: 2021-06-24 | End: 2021-08-23

## 2021-07-22 DIAGNOSIS — Q05.9 SPINA BIFIDA, UNSPECIFIED HYDROCEPHALUS PRESENCE, UNSPECIFIED SPINAL REGION (HCC): ICD-10-CM

## 2021-07-22 NOTE — TELEPHONE ENCOUNTER
Brooke East called to request a refill on his medication.       Last office visit : 3/8/2021   Next office visit : 8/3/2021     Requested Prescriptions     Pending Prescriptions Disp Refills    Catheters MISC 180 each 3     Sig: CATHETER 6 TIMES DAILY IN & OUT *Q05.9*            Kenya Ni

## 2021-07-26 ENCOUNTER — OFFICE VISIT (OUTPATIENT)
Dept: PRIMARY CARE CLINIC | Age: 36
End: 2021-07-26
Payer: MEDICAID

## 2021-07-26 VITALS
WEIGHT: 72 LBS | BODY MASS INDEX: 14.14 KG/M2 | HEIGHT: 60 IN | DIASTOLIC BLOOD PRESSURE: 84 MMHG | OXYGEN SATURATION: 97 % | TEMPERATURE: 97.8 F | RESPIRATION RATE: 18 BRPM | SYSTOLIC BLOOD PRESSURE: 128 MMHG | HEART RATE: 126 BPM

## 2021-07-26 DIAGNOSIS — R30.0 DYSURIA: ICD-10-CM

## 2021-07-26 DIAGNOSIS — N30.00 ACUTE CYSTITIS WITHOUT HEMATURIA: Primary | ICD-10-CM

## 2021-07-26 LAB
APPEARANCE FLUID: ABNORMAL
BILIRUBIN, POC: ABNORMAL
BLOOD URINE, POC: ABNORMAL
CLARITY, POC: ABNORMAL
COLOR, POC: ABNORMAL
GLUCOSE URINE, POC: ABNORMAL
KETONES, POC: ABNORMAL
LEUKOCYTE EST, POC: ABNORMAL
NITRITE, POC: ABNORMAL
PH, POC: 7
PROTEIN, POC: ABNORMAL
SPECIFIC GRAVITY, POC: 1.02
UROBILINOGEN, POC: 0.2

## 2021-07-26 PROCEDURE — 99213 OFFICE O/P EST LOW 20 MIN: CPT | Performed by: NURSE PRACTITIONER

## 2021-07-26 PROCEDURE — 81002 URINALYSIS NONAUTO W/O SCOPE: CPT | Performed by: NURSE PRACTITIONER

## 2021-07-26 RX ORDER — SULFAMETHOXAZOLE AND TRIMETHOPRIM 800; 160 MG/1; MG/1
1 TABLET ORAL 2 TIMES DAILY
Qty: 20 TABLET | Refills: 0 | Status: SHIPPED | OUTPATIENT
Start: 2021-07-26 | End: 2021-08-05

## 2021-07-26 ASSESSMENT — ENCOUNTER SYMPTOMS
RHINORRHEA: 0
VOICE CHANGE: 0
PHOTOPHOBIA: 0
COLOR CHANGE: 0
BACK PAIN: 0
COUGH: 0
VOMITING: 0
NAUSEA: 0
SHORTNESS OF BREATH: 0

## 2021-07-26 NOTE — PROGRESS NOTES
[address] [facility]  [address] [address] [place] [address]  Dept: [phone]  Dept Fax: [phone]  Loc: [phone]    Edy Harrison is a 28 y.o. male who presents today for his medical conditions/complaints as noted below. Edy Harrison is c/o of Dysuria (Noticed cloudy urine Saturday afternoon, does not seem to have any pain associated pt parent cath him to remove urine. Pt mother states urine cannot be seen through at this point. )        HPI:     HPI   Chief Complaint   Patient presents with    Dysuria     Noticed cloudy urine Saturday afternoon, does not seem to have any pain associated pt parent cath him to remove urine. Pt mother states urine cannot be seen through at this point. Patient presents today for evaluation of dysuria. Symptoms have been present for 3 days. He denies fever. Patient is straight-cathed routinely due to history of spina bifida/paralysis. There has been recent cloudy urine per his mother. Past Medical History:   Diagnosis Date    Anxiety     Chronic kidney disease     kidney stones started at age 5, treated at [facility].   Has had lithotripsy and surgery to remove stones    Club Foot     [facility] paraplegia [facility]     Hypertension     diagnosed at age 6    Leg fracture, left     while in body cast due to swelling    Myelomeningocele with hydrocephalus ([facility] [address] [facility] 1985    Open spina bifida at birth   [facility] Neuropathy     Pressure sore on ankle     and knee     Scoliosis     Spastic neurogenic bladder     cather x 5 a day      (ventriculoperitoneal) shunt status       Past Surgical History:   Procedure Laterality Date    HERNIA REPAIR      groin    HIP SURGERY Left     LITHOTRIPSY      VENTRICULOPERITONEAL SHUNT      with revisions        Vitals 7/26/2021 2/2/2021 10/23/2020 6/22/2020 3/16/2020 4/59/9544   SYSTOLIC 753 001 065 567  850   DIASTOLIC 84 76 86 82 - 88   Site - - - - - -   Position - - - - - -   Pulse 126 111 122 125 122 142   Temp 97.8 98.4 98.3 98.8 - 99.1   Resp 18 18 - 20 - -   SpO2 97 97 98 97 - 99   Weight 72 lb 71 lb 6.4 oz 69 lb 76 lb - 72 lb   Height 4' 0\" 4' 0\" 4' 0\" 4' 0\" - 4' 0\"   Body mass index 21.97 kg/m2 21.79 kg/m2 21.05 kg/m2 23.19 kg/m2 - 21.97 kg/m2   Pain Level - - - - - -   Some recent data might be hidden       Family History   Problem Relation Age of Onset    Colon Cancer Mother 54        In remission    High Cholesterol Mother     High Blood Pressure Mother     Thyroid Disease Mother         Hypothyroidism    Coronary Art Dis Maternal Grandmother         CABG x 2    Heart Attack Maternal Grandmother     High Blood Pressure Maternal Grandmother     Cancer Maternal Grandmother         Pancreatic Cancer    Heart Failure Maternal Grandfather          in     Immunodeficiency Neg Hx        Social History     Tobacco Use    Smoking status: Never Smoker    Smokeless tobacco: Never Used   Substance Use Topics    Alcohol use: No      Current Outpatient Medications on File Prior to Visit   Medication Sig Dispense Refill    Catheters MISC CATHETER 6 TIMES DAILY IN & OUT *Q05.9* 180 each 5    Omega-3 Fatty Acids (THE VERY FINEST FISH OIL) LIQD TAKE 3 ML BY MOUTH DAILY  200 mL 0    solifenacin (VESICARE) 10 MG tablet Take 1 tablet by mouth daily 30 tablet 3    solifenacin (VESICARE) 5 MG tablet Take 2 tablets by mouth daily 45 tablet 11    Cranberry 200 MG CAPS Take 1 capsule by mouth daily      lisinopril (PRINIVIL;ZESTRIL) 5 MG tablet TAKE (1) TABLET DAILY FOR BLOOD PRESSURE. 30 tablet 11    potassium citrate (UROCIT-K) 5 MEQ (540 MG) extended release tablet TAKE (2) TABLETS BY MOUTH THREE TIMES DAILY WITH MEALS. 180 tablet 5    busPIRone (BUSPAR) 5 MG tablet TAKE 1 TABLET TWICE DAILY AS NEEDED FOR ANXIETY 60 tablet 11    menthol-zinc oxide (CALMOSEPTINE) 0.44-20.6 % OINT ointment Apply topically daily Max 30 ml per day.       Blood Pressure difficulty urinating and flank pain. Musculoskeletal: Negative for back pain and neck pain. Skin: Negative for color change and rash. Allergic/Immunologic: Negative for environmental allergies and food allergies. Neurological: Negative for dizziness, speech difficulty and headaches. Hematological: Does not bruise/bleed easily. Psychiatric/Behavioral: Negative for sleep disturbance and suicidal ideas. Objective:     Physical Exam  Vitals and nursing note reviewed. Constitutional:       Appearance: He is well-developed. Comments: wheelchair   HENT:      Head: Atraumatic. Right Ear: External ear normal.      Left Ear: External ear normal.      Nose: Nose normal.   Eyes:      Conjunctiva/sclera: Conjunctivae normal.      Pupils: Pupils are equal, round, and reactive to light. Cardiovascular:      Rate and Rhythm: Normal rate and regular rhythm. Heart sounds: Normal heart sounds, S1 normal and S2 normal.   Pulmonary:      Effort: Pulmonary effort is normal.      Breath sounds: Normal breath sounds. Abdominal:      General: Bowel sounds are normal.      Palpations: Abdomen is soft. Musculoskeletal:         General: Normal range of motion. Cervical back: Normal range of motion and neck supple. Thoracic back: Deformity present. Lumbar back: Deformity present. Skin:     General: Skin is warm and dry. Neurological:      Mental Status: He is alert and oriented to person, place, and time. Psychiatric:         Behavior: Behavior normal.       /84   Pulse 126   Temp 97.8 °F (36.6 °C) (Temporal)   Resp 18   Ht 4' (1.219 m)   Wt 72 lb (32.7 kg)   SpO2 97%   BMI 21.97 kg/m²     Assessment:       Diagnosis Orders   1. Acute cystitis without hematuria     2.  Dysuria  POCT Urinalysis no Micro    Culture, Urine       Results for orders placed or performed in visit on 07/26/21   POCT Urinalysis no Micro   Result Value Ref Range    Color, UA light yellow Clarity, UA slightly cloudy     Glucose, UA POC -     Bilirubin, UA -     Ketones, UA -     Spec Grav, UA 1.020     Blood, UA POC trace-lysed     pH, UA 7.0     Protein, UA POC -     Urobilinogen, UA 0.2     Leukocytes, UA large     Nitrite, UA -     Appearance, Fluid Slightly Cloudy Clear, Slightly Cloudy       Plan: Will send in urine for culture; he will begin Bactrim as directed. Okay to schedule follow-up for later date since he is in office today; he is still to have routine labs done as previously discussed. Patient given educational materials -see patient instructions. Discussed use, benefit, and side effects of prescribed medications. All patient questions answered. Pt voiced understanding. Reviewed health maintenance. Instructed to continue currentmedications, diet and exercise. Patient agreed with treatment plan. Follow up as directed. MEDICATIONS:  Orders Placed This Encounter   Medications    sulfamethoxazole-trimethoprim (BACTRIM DS;SEPTRA DS) 800-160 MG per tablet     Sig: Take 1 tablet by mouth 2 times daily for 10 days     Dispense:  20 tablet     Refill:  0         ORDERS:  Orders Placed This Encounter   Procedures    Culture, Urine    POCT Urinalysis no Micro       Follow-up:  No follow-ups on file. PATIENT INSTRUCTIONS:  There are no Patient Instructions on file for this visit. Electronically signed by DEVON Seaman on 7/26/2021 at 10:34 AM    EMR Dragon/transcription disclaimer:  Much of thisencounter note is electronic transcription/translation of spoken language to printed texts. The electronic translation of spoken language may be erroneous, or at times, nonsensical words or phrases may be inadvertentlytranscribed.   Although I have reviewed the note for such errors, some may still exist.

## 2021-07-27 DIAGNOSIS — I10 ESSENTIAL HYPERTENSION: ICD-10-CM

## 2021-07-27 DIAGNOSIS — F41.9 ANXIETY: ICD-10-CM

## 2021-07-27 DIAGNOSIS — E78.2 MIXED HYPERLIPIDEMIA: ICD-10-CM

## 2021-07-27 LAB
ALBUMIN SERPL-MCNC: 4.3 G/DL (ref 3.5–5.2)
ALP BLD-CCNC: 81 U/L (ref 40–130)
ALT SERPL-CCNC: 23 U/L (ref 5–41)
ANION GAP SERPL CALCULATED.3IONS-SCNC: 16 MMOL/L (ref 7–19)
AST SERPL-CCNC: 22 U/L (ref 5–40)
BASOPHILS ABSOLUTE: 0 K/UL (ref 0–0.2)
BASOPHILS RELATIVE PERCENT: 0.4 % (ref 0–1)
BILIRUB SERPL-MCNC: 1 MG/DL (ref 0.2–1.2)
BUN BLDV-MCNC: 9 MG/DL (ref 6–20)
CALCIUM SERPL-MCNC: 9.7 MG/DL (ref 8.6–10)
CHLORIDE BLD-SCNC: 97 MMOL/L (ref 98–111)
CHOLESTEROL, FASTING: 175 MG/DL (ref 160–199)
CO2: 26 MMOL/L (ref 22–29)
CREAT SERPL-MCNC: 0.5 MG/DL (ref 0.5–1.2)
EOSINOPHILS ABSOLUTE: 0.1 K/UL (ref 0–0.6)
EOSINOPHILS RELATIVE PERCENT: 1 % (ref 0–5)
GFR AFRICAN AMERICAN: >59
GFR NON-AFRICAN AMERICAN: >60
GLUCOSE BLD-MCNC: 51 MG/DL (ref 74–109)
HBA1C MFR BLD: 5.1 % (ref 4–6)
HCT VFR BLD CALC: 46.2 % (ref 42–52)
HDLC SERPL-MCNC: 55 MG/DL (ref 55–121)
HEMOGLOBIN: 15.1 G/DL (ref 14–18)
IMMATURE GRANULOCYTES #: 0 K/UL
LDL CHOLESTEROL CALCULATED: 110 MG/DL
LYMPHOCYTES ABSOLUTE: 2.2 K/UL (ref 1.1–4.5)
LYMPHOCYTES RELATIVE PERCENT: 19.8 % (ref 20–40)
MCH RBC QN AUTO: 31.4 PG (ref 27–31)
MCHC RBC AUTO-ENTMCNC: 32.7 G/DL (ref 33–37)
MCV RBC AUTO: 96 FL (ref 80–94)
MONOCYTES ABSOLUTE: 0.8 K/UL (ref 0–0.9)
MONOCYTES RELATIVE PERCENT: 7.5 % (ref 0–10)
NEUTROPHILS ABSOLUTE: 7.9 K/UL (ref 1.5–7.5)
NEUTROPHILS RELATIVE PERCENT: 71.1 % (ref 50–65)
PDW BLD-RTO: 12.1 % (ref 11.5–14.5)
PLATELET # BLD: 238 K/UL (ref 130–400)
PMV BLD AUTO: 10.8 FL (ref 9.4–12.4)
POTASSIUM SERPL-SCNC: 4.2 MMOL/L (ref 3.5–5)
RBC # BLD: 4.81 M/UL (ref 4.7–6.1)
SODIUM BLD-SCNC: 139 MMOL/L (ref 136–145)
TOTAL PROTEIN: 7.3 G/DL (ref 6.6–8.7)
TRIGLYCERIDE, FASTING: 52 MG/DL (ref 0–149)
TSH REFLEX FT4: 1.42 UIU/ML (ref 0.35–5.5)
WBC # BLD: 11.1 K/UL (ref 4.8–10.8)

## 2021-07-28 LAB
ORGANISM: ABNORMAL
URINE CULTURE, ROUTINE: ABNORMAL
URINE CULTURE, ROUTINE: ABNORMAL

## 2021-08-03 ENCOUNTER — OFFICE VISIT (OUTPATIENT)
Dept: PRIMARY CARE CLINIC | Age: 36
End: 2021-08-03
Payer: MEDICAID

## 2021-08-03 VITALS
WEIGHT: 72 LBS | SYSTOLIC BLOOD PRESSURE: 118 MMHG | BODY MASS INDEX: 21.97 KG/M2 | OXYGEN SATURATION: 98 % | DIASTOLIC BLOOD PRESSURE: 76 MMHG | HEART RATE: 139 BPM | TEMPERATURE: 97.8 F

## 2021-08-03 DIAGNOSIS — I10 ESSENTIAL HYPERTENSION: ICD-10-CM

## 2021-08-03 DIAGNOSIS — Q05.9 SPINA BIFIDA, UNSPECIFIED HYDROCEPHALUS PRESENCE, UNSPECIFIED SPINAL REGION (HCC): ICD-10-CM

## 2021-08-03 DIAGNOSIS — F41.9 ANXIETY: ICD-10-CM

## 2021-08-03 DIAGNOSIS — E78.2 MIXED HYPERLIPIDEMIA: Primary | ICD-10-CM

## 2021-08-03 DIAGNOSIS — N39.0 RECURRENT UTI: ICD-10-CM

## 2021-08-03 PROCEDURE — 99214 OFFICE O/P EST MOD 30 MIN: CPT | Performed by: NURSE PRACTITIONER

## 2021-08-03 RX ORDER — BUSPIRONE HYDROCHLORIDE 5 MG/1
TABLET ORAL
Qty: 60 TABLET | Refills: 11 | Status: SHIPPED | OUTPATIENT
Start: 2021-08-03 | End: 2022-07-12 | Stop reason: SDUPTHER

## 2021-08-03 ASSESSMENT — ENCOUNTER SYMPTOMS
EYES NEGATIVE: 1
RESPIRATORY NEGATIVE: 1
GASTROINTESTINAL NEGATIVE: 1

## 2021-08-03 NOTE — PROGRESS NOTES
800-160 MG per tablet Take 1 tablet by mouth 2 times daily for 10 days 20 tablet 0    Catheters MISC CATHETER 6 TIMES DAILY IN & OUT *Q05.9* 180 each 5    Omega-3 Fatty Acids (THE VERY FINEST FISH OIL) LIQD TAKE 3 ML BY MOUTH DAILY  200 mL 0    solifenacin (VESICARE) 10 MG tablet Take 1 tablet by mouth daily 30 tablet 3    solifenacin (VESICARE) 5 MG tablet Take 2 tablets by mouth daily 45 tablet 11    Cranberry 200 MG CAPS Take 1 capsule by mouth daily      lisinopril (PRINIVIL;ZESTRIL) 5 MG tablet TAKE (1) TABLET DAILY FOR BLOOD PRESSURE. 30 tablet 11    menthol-zinc oxide (CALMOSEPTINE) 0.44-20.6 % OINT ointment Apply topically daily Max 30 ml per day.  Blood Pressure Monitoring [address] Disabled patient who doesn't drive needs BP monitor for home for parents to check it for him 1 Device 0    aspirin 81 MG chewable tablet Take 81 mg by mouth daily. No current facility-administered medications for this visit. Allergies   Allergen Reactions    Latex Swelling and Rash     Had reaction at dentist office-redness, swelling, and rash  Other reaction(s): rash  Had reaction at dentist office-redness, swelling, and rash    Ceftriaxone Other (See Comments)     Other reaction(s): Other (see comments)  Fever, hallucinations was transported via ambulance [address]   Other reaction(s): high fever  Fever, hallucinations was transported via ambulance [place]     Pravastatin      Myalgias    Vancomycin Other (See Comments)     Other reaction(s):  Other (See Comments)  Red warm hands and itching  Red warm hands and itching       Family History   Problem Relation Age of Onset    Colon Cancer Mother 54        In remission    High Cholesterol Mother     High Blood Pressure Mother     Thyroid Disease Mother         Hypothyroidism    Coronary Art Dis Maternal Grandmother         CABG x 2    Heart Attack Maternal Grandmother     High Blood Pressure Maternal Grandmother     Cancer Maternal Grandmother Pancreatic Cancer    Heart Failure Maternal Grandfather          in     Immunodeficiency Neg Hx                Subjective:      Review of Systems   Constitutional: Negative. HENT: Negative. Eyes: Negative. Respiratory: Negative. Cardiovascular: Negative. Gastrointestinal: Negative. Endocrine: Negative. Genitourinary: Negative. Musculoskeletal: Negative. Skin: Positive for wound (left knee lesion). Neurological: Negative. Hematological: Negative. Psychiatric/Behavioral: Negative. Objective:     Physical Exam  Constitutional:       Appearance: Normal appearance. HENT:      Head: Normocephalic and atraumatic. Nose: Nose normal.   Eyes:      Extraocular Movements: Extraocular movements intact. Conjunctiva/sclera: Conjunctivae normal.      Pupils: Pupils are equal, round, and reactive to light. Cardiovascular:      Rate and Rhythm: Normal rate and regular rhythm. Pulmonary:      Effort: Pulmonary effort is normal.      Breath sounds: Normal breath sounds. Abdominal:      General: Abdomen is flat. Bowel sounds are normal.   Musculoskeletal:         General: Normal range of motion. Cervical back: Normal range of motion. Thoracic back: Deformity present. Lumbar back: Deformity present. Legs:       Comments: wheelchair   Skin:     General: Skin is warm and dry. Capillary Refill: Capillary refill takes less than 2 seconds. Neurological:      General: No focal deficit present. Mental Status: He is alert and oriented to person, place, and time. Mental status is at baseline. Psychiatric:         Mood and Affect: Mood normal.         Behavior: Behavior normal.         Thought Content: Thought content normal.         Judgment: Judgment normal.         /76 (Site: Left Upper Arm)   Pulse 139   Temp 97.8 °F (36.6 °C)   Wt 72 lb (32.7 kg)   SpO2 98%   BMI 21.97 kg/m²     Assessment:      Diagnosis Orders   1.  Mixed hyperlipidemia     2. Essential hypertension     3. Spina bifida, unspecified hydrocephalus presence, unspecified spinal region ([facility] [address]     4. Recurrent UTI Stable    5. Anxiety  busPIRone (BUSPAR) 5 MG tablet       No results found for this visit on 08/03/21. Plan: Will continue current medication regimen. Discussed not needing repeat labs until next year based on most recent results. Patient to keep follow up as scheduled with urology. More than 50% of the time was spent counseling and coordinating care for a total time of 34 mins face to face. Return in about 6 months (around 2/3/2022) for Follow up chronic conditions. No orders of the defined types were placed in this encounter. Orders Placed This Encounter   Medications    busPIRone (BUSPAR) 5 MG tablet     Sig: TAKE 1 TABLET TWICE DAILY AS NEEDED FOR ANXIETY     Dispense:  60 tablet     Refill:  11     $            Patient offered educational handouts and has had all questions answered. Patient voices understanding and agrees to plans along with risks and benefits of plan. Patient is instructed to continue prior meds, diet, and exercise plans as instructed. Patient agrees to follow up as instructed and sooner if needed. Patient agrees to go to ER if condition becomes emergent. EMR Dragon/transcription disclaimer: Some of this encounter note is an electronic transcription/translation of spoken language to printed text. The electronic translation of spoken language may permit erroneous, or at times, nonsensical words or phrases to be inadvertently transcribed.  Although I have reviewed the note for such errors, some may still exist.    Electronically signed by DEVON Mclean on 8/3/2021 at 10:37 AM

## 2021-08-16 ENCOUNTER — VIRTUAL VISIT (OUTPATIENT)
Dept: PRIMARY CARE CLINIC | Age: 36
End: 2021-08-16
Payer: MEDICAID

## 2021-08-16 DIAGNOSIS — U07.1 COVID-19: Primary | ICD-10-CM

## 2021-08-16 DIAGNOSIS — J02.9 SORE THROAT: ICD-10-CM

## 2021-08-16 DIAGNOSIS — F41.9 ANXIETY: ICD-10-CM

## 2021-08-16 PROCEDURE — 99443 PR PHYS/QHP TELEPHONE EVALUATION 21-30 MIN: CPT | Performed by: NURSE PRACTITIONER

## 2021-08-16 ASSESSMENT — ENCOUNTER SYMPTOMS
VOMITING: 0
ABDOMINAL PAIN: 0
CHEST TIGHTNESS: 0
NAUSEA: 0
SHORTNESS OF BREATH: 0
DIARRHEA: 0
COLOR CHANGE: 0
SORE THROAT: 1
COUGH: 1

## 2021-08-16 NOTE — PROGRESS NOTES
Elgin Nowak (:  1985) is a 39 y.o. male,Established patient, here for evaluation of the following chief complaint(s): Concern For COVID-19  Spoke to Parker De La Garza, mom, who reports patient was tested for Covid on 2021 who presents for follow up from positive covid test result. Mom is very concerned about what needs to be done and what to do if his anxiety increases. Patient had temp yesterday of 99.5 and today it was 98.6 and told his mom that he felt fine. Patient reports he had a little scratchy throat yesterday. No fevers today. Denies any loss of taste or smell. He ate oatmeal for breakfast with no concerns. Mom reports they have not been vaccinated. Mom denies he has any productive cough, but has an occasional dry cough. ASSESSMENT/PLAN:  1. COVID-19      2. Sore throat  Discussed importance of drinking plenty of water. Instructed mom to let us know if patient runs a fever or has any other symptoms. 3. Anxiety  Patient will continue taking buspar 5 mg twice daily as needed for anxiety. Return if symptoms worsen or fail to improve. SUBJECTIVE/OBJECTIVE:  HPI    Review of Systems   Constitutional: Negative for activity change and fever. HENT: Positive for sore throat. Negative for congestion and ear pain. Respiratory: Positive for cough. Negative for chest tightness and shortness of breath. Cardiovascular: Negative for chest pain. Gastrointestinal: Negative for abdominal pain, diarrhea, nausea and vomiting. Genitourinary: Negative for frequency and urgency. Musculoskeletal: Negative for arthralgias and myalgias. Skin: Negative for color change. Neurological: Negative for dizziness, weakness and numbness. Psychiatric/Behavioral: Negative for agitation. The patient is not nervous/anxious. No flowsheet data found.      Physical Exam    [INSTRUCTIONS:  \"[x]\" Indicates a positive item  \"[]\" Indicates a negative item  -- DELETE ALL ITEMS NOT EXAMINED]    Constitutional: [x] Appears well-developed and well-nourished [x] No apparent distress      [] Abnormal -     Mental status: [x] Alert and awake  [x] Oriented to person/place/time [x] Able to follow commands    [] Abnormal -     Eyes:   EOM    [x]  Normal    [] Abnormal -   Sclera  [x]  Normal    [] Abnormal -          Discharge [x]  None visible   [] Abnormal -     HENT: [x] Normocephalic, atraumatic  [] Abnormal -   [x] Mouth/Throat: Mucous membranes are moist    External Ears [x] Normal  [] Abnormal -    Neck: [x] No visualized mass [] Abnormal -     Pulmonary/Chest: [x] Respiratory effort normal   [x] No visualized signs of difficulty breathing or respiratory distress        [] Abnormal -      Musculoskeletal:   [x] Normal gait with no signs of ataxia         [x] Normal range of motion of neck        [] Abnormal -     Neurological:        [x] No Facial Asymmetry (Cranial nerve 7 motor function) (limited exam due to video visit)          [x] No gaze palsy        [] Abnormal -          Skin:        [x] No significant exanthematous lesions or discoloration noted on facial skin         [] Abnormal -            Psychiatric:       [x] Normal Affect [] Abnormal -        [x] No Hallucinations      On this date 8/16/2021 I have spent 25 minutes reviewing previous notes, test results and face to face (virtual) with the patient discussing the diagnosis and importance of compliance with the treatment plan as well as documenting on the day of the visit. Cassidy Porter, was evaluated through a synchronous (real-time) audio-video encounter. The patient (or guardian if applicable) is aware that this is a billable service. Verbal consent to proceed has been obtained within the past 12 months.  The visit was conducted pursuant to the emergency declaration under the [facility], [address] [place] [address] authority and the [employer] [facility] Act.  Patient identification was verified, and a caregiver was present when appropriate. The patient was located in a state where the provider was credentialed to provide care. An electronic signature was used to authenticate this note.     --DEVON Gaxiola - CNP

## 2021-08-23 DIAGNOSIS — N32.81 OVERACTIVE BLADDER: ICD-10-CM

## 2021-08-23 RX ORDER — SOLIFENACIN SUCCINATE 10 MG/1
10 TABLET, FILM COATED ORAL DAILY
Qty: 30 TABLET | Refills: 3 | Status: SHIPPED | OUTPATIENT
Start: 2021-08-23 | End: 2022-01-13

## 2021-08-23 RX ORDER — OMEGA-3/DHA/EPA/FISH OIL 1600MG/5ML
LIQUID (ML) ORAL
Qty: 200 ML | Refills: 0 | Status: SHIPPED | OUTPATIENT
Start: 2021-08-23 | End: 2021-12-20

## 2021-11-03 ENCOUNTER — IMMUNIZATION (OUTPATIENT)
Dept: PRIMARY CARE CLINIC | Age: 36
End: 2021-11-03
Payer: MEDICAID

## 2021-11-03 DIAGNOSIS — Z23 NEED FOR INFLUENZA VACCINATION: Primary | ICD-10-CM

## 2021-11-03 PROCEDURE — 90471 IMMUNIZATION ADMIN: CPT | Performed by: NURSE PRACTITIONER

## 2021-11-03 PROCEDURE — 90674 CCIIV4 VAC NO PRSV 0.5 ML IM: CPT | Performed by: NURSE PRACTITIONER

## 2021-11-18 ENCOUNTER — OFFICE VISIT (OUTPATIENT)
Dept: PRIMARY CARE CLINIC | Age: 36
End: 2021-11-18
Payer: MEDICAID

## 2021-11-18 VITALS — HEART RATE: 154 BPM | SYSTOLIC BLOOD PRESSURE: 138 MMHG | OXYGEN SATURATION: 96 % | DIASTOLIC BLOOD PRESSURE: 88 MMHG

## 2021-11-18 DIAGNOSIS — T83.511A URINARY TRACT INFECTION ASSOCIATED WITH CATHETERIZATION OF URINARY TRACT, UNSPECIFIED INDWELLING URINARY CATHETER TYPE, INITIAL ENCOUNTER (HCC): Primary | ICD-10-CM

## 2021-11-18 DIAGNOSIS — N39.0 URINARY TRACT INFECTION ASSOCIATED WITH CATHETERIZATION OF URINARY TRACT, UNSPECIFIED INDWELLING URINARY CATHETER TYPE, INITIAL ENCOUNTER (HCC): Primary | ICD-10-CM

## 2021-11-18 DIAGNOSIS — L98.9 LESION OF LOWER EXTREMITY: ICD-10-CM

## 2021-11-18 DIAGNOSIS — R82.90 FOUL SMELLING URINE: ICD-10-CM

## 2021-11-18 LAB
APPEARANCE FLUID: NORMAL
BILIRUBIN, POC: NORMAL
BLOOD URINE, POC: NORMAL
CLARITY, POC: NORMAL
COLOR, POC: YELLOW
GLUCOSE URINE, POC: NORMAL
KETONES, POC: NORMAL
LEUKOCYTE EST, POC: NORMAL
NITRITE, POC: NORMAL
PH, POC: 6.5
PROTEIN, POC: NORMAL
SPECIFIC GRAVITY, POC: 1.01
UROBILINOGEN, POC: 0.2

## 2021-11-18 PROCEDURE — 99214 OFFICE O/P EST MOD 30 MIN: CPT | Performed by: FAMILY MEDICINE

## 2021-11-18 PROCEDURE — 81002 URINALYSIS NONAUTO W/O SCOPE: CPT | Performed by: FAMILY MEDICINE

## 2021-11-18 RX ORDER — D-MANNOSE 99 %
1 POWDER (GRAM) MISCELLANEOUS 2 TIMES DAILY
COMMUNITY

## 2021-11-18 RX ORDER — SULFAMETHOXAZOLE AND TRIMETHOPRIM 800; 160 MG/1; MG/1
1 TABLET ORAL 2 TIMES DAILY
Qty: 14 TABLET | Refills: 0 | Status: SHIPPED | OUTPATIENT
Start: 2021-11-18 | End: 2021-11-25

## 2021-11-18 ASSESSMENT — ENCOUNTER SYMPTOMS
EYES NEGATIVE: 1
RESPIRATORY NEGATIVE: 1
GASTROINTESTINAL NEGATIVE: 1

## 2021-11-18 NOTE — PROGRESS NOTES
Gen: Alert and active. Warm, pink, well perfused. NG in place  Lungs: Equal air entry, no retractions. Chest: S1, S2 normal, no murmur. Normal pulses X4, normal refill.    Abd: Soft, chronically full, nontender, nondistended, + bowel sounds, no HSM, no history of malignant melanoma of skin    Catheter-associated urinary tract infection (HCC)    Sepsis (HCC)    Lactic acidosis    Myelomeningocele with hydrocephalus (HCC)    Tachycardia    Congenital flaccid paralysis (HCC)    Panic attack    Ulcer of penis    Leukocytosis    Anxiety    E. coli UTI    Bacterial UTI    Essential hypertension       PSHx:  Past Surgical History:   Procedure Laterality Date    HERNIA REPAIR      groin    HIP SURGERY Left     LITHOTRIPSY      VENTRICULOPERITONEAL SHUNT      with revisions        PFHx:  Family History   Problem Relation Age of Onset    Colon Cancer Mother 54        In remission    High Cholesterol Mother     High Blood Pressure Mother     Thyroid Disease Mother         Hypothyroidism    Coronary Art Dis Maternal Grandmother         CABG x 2    Heart Attack Maternal Grandmother     High Blood Pressure Maternal Grandmother     Cancer Maternal Grandmother         Pancreatic Cancer    Heart Failure Maternal Grandfather          in     Immunodeficiency Neg Hx        SocialHx:  Social History     Tobacco Use    Smoking status: Never Smoker    Smokeless tobacco: Never Used   Substance Use Topics    Alcohol use: No       Allergies: Allergies   Allergen Reactions    Latex Swelling and Rash     Had reaction at dentist office-redness, swelling, and rash  Other reaction(s): rash  Had reaction at dentist office-redness, swelling, and rash    Ceftriaxone Other (See Comments)     Other reaction(s): Other (see comments)  Fever, hallucinations was transported via ambulance [address]   Other reaction(s): high fever  Fever, hallucinations was transported via ambulance [place]     Pravastatin      Myalgias    Vancomycin Other (See Comments)     Other reaction(s):  Other (See Comments)  Red warm hands and itching  Red warm hands and itching       Medications:  Current Outpatient Medications   Medication Sig Dispense Refill    D-Mannose POWD 1 Units by Does not apply route 2 times daily      sulfamethoxazole-trimethoprim (BACTRIM DS;SEPTRA DS) 800-160 MG per tablet Take 1 tablet by mouth 2 times daily for 7 days 14 tablet 0    Omega-3 Fatty Acids (THE VERY FINEST FISH OIL) LIQD TAKE 3 ML BY MOUTH DAILY  200 mL 0    busPIRone (BUSPAR) 5 MG tablet TAKE 1 TABLET TWICE DAILY AS NEEDED FOR ANXIETY 60 tablet 11    Catheters MISC CATHETER 6 TIMES DAILY IN & OUT *Q05.9* 180 each 5    solifenacin (VESICARE) 5 MG tablet Take 2 tablets by mouth daily 45 tablet 11    Cranberry 200 MG CAPS Take 1 capsule by mouth daily      lisinopril (PRINIVIL;ZESTRIL) 5 MG tablet TAKE (1) TABLET DAILY FOR BLOOD PRESSURE. 30 tablet 11    menthol-zinc oxide (CALMOSEPTINE) 0.44-20.6 % OINT ointment Apply topically daily Max 30 ml per day.  Blood Pressure Monitoring [address] Disabled patient who doesn't drive needs BP monitor for home for parents to check it for him 1 Device 0    aspirin 81 MG chewable tablet Take 81 mg by mouth daily.  solifenacin (VESICARE) 10 MG tablet TAKE 1 TABLET BY MOUTH DAILY  (Patient not taking: Reported on 11/18/2021) 30 tablet 3     No current facility-administered medications for this visit. Objective:   PE:  /88   Pulse 154   SpO2 96%   Physical Exam  Vitals and nursing note reviewed. Exam conducted with a chaperone present (both parents). Constitutional:       Comments: + spina bifida   HENT:      Head: Normocephalic and atraumatic. Nose: Nose normal.   Eyes:      Extraocular Movements: Extraocular movements intact. Pupils: Pupils are equal, round, and reactive to light. Cardiovascular:      Rate and Rhythm: Normal rate and regular rhythm. Pulmonary:      Effort: Pulmonary effort is normal.      Breath sounds: Normal breath sounds. Abdominal:      General: Bowel sounds are normal.      Palpations: Abdomen is soft. Musculoskeletal:      Cervical back: Normal range of motion.       Thoracic back: Deformity present. Lumbar back: Deformity present. Legs:       Comments: wheelchair bound, muscle wasting both LE   Skin:     General: Skin is warm. Capillary Refill: Capillary refill takes less than 2 seconds. Findings: Lesion (3 x 4 cm pedunculated lesion on a thick base that appears like a cauliflower, with mild erythema around surrounding areas in the L knee) present. Neurological:      Mental Status: He is alert and oriented to person, place, and time. Comments: paraplegic from waist down   Psychiatric:         Mood and Affect: Mood normal.         Behavior: Behavior normal.         Thought Content: Thought content normal.            Assessment & Plan   Edna Tom was seen today for urinary tract infection. Diagnoses and all orders for this visit:    Urinary tract infection associated with catheterization of urinary tract, unspecified indwelling urinary catheter type, initial encounter ([facility]  -     sulfamethoxazole-trimethoprim (BACTRIM DS;SEPTRA DS) 800-160 MG per tablet; Take 1 tablet by mouth 2 times daily for 7 days  -     POCT Urinalysis no Micro    Foul smelling urine    Lesion of lower extremity  -     Ana Hernandez MD, General Surgery, [place]        No follow-ups on file. All questions were answered. Medications, including possible adverse effects, and instructions were reviewed and  understanding was confirmed. Follow-up recommendations, including when to contact or return to office (ie; if symptoms worsen or fail to improve), were discussed and acknowledged.     Electronically signed by Adriano Arenas MD on 11/18/21 at 10:21 AM CST

## 2021-12-03 DIAGNOSIS — N39.0 URINARY TRACT INFECTION ASSOCIATED WITH CATHETERIZATION OF URINARY TRACT, UNSPECIFIED INDWELLING URINARY CATHETER TYPE, INITIAL ENCOUNTER (HCC): Primary | ICD-10-CM

## 2021-12-03 DIAGNOSIS — T83.511A URINARY TRACT INFECTION ASSOCIATED WITH CATHETERIZATION OF URINARY TRACT, UNSPECIFIED INDWELLING URINARY CATHETER TYPE, INITIAL ENCOUNTER (HCC): Primary | ICD-10-CM

## 2021-12-03 PROCEDURE — 81002 URINALYSIS NONAUTO W/O SCOPE: CPT | Performed by: FAMILY MEDICINE

## 2021-12-20 RX ORDER — OMEGA-3/DHA/EPA/FISH OIL 1600MG/5ML
LIQUID (ML) ORAL
Qty: 200 ML | Refills: 5 | Status: SHIPPED | OUTPATIENT
Start: 2021-12-20

## 2022-01-13 ENCOUNTER — HOSPITAL ENCOUNTER (OUTPATIENT)
Dept: WOUND CARE | Age: 37
Discharge: HOME | End: 2022-01-13
Payer: MEDICAID

## 2022-01-13 ENCOUNTER — HOSPITAL ENCOUNTER (OUTPATIENT)
Age: 37
Setting detail: SPECIMEN
Discharge: HOME | End: 2022-01-13
Payer: MEDICAID

## 2022-01-13 VITALS
HEART RATE: 128 BPM | WEIGHT: 72 LBS | TEMPERATURE: 98.3 F | DIASTOLIC BLOOD PRESSURE: 96 MMHG | SYSTOLIC BLOOD PRESSURE: 160 MMHG | RESPIRATION RATE: 20 BRPM | BODY MASS INDEX: 14.14 KG/M2 | HEIGHT: 60 IN

## 2022-01-13 DIAGNOSIS — L97.922 ULCER OF LEFT LOWER EXTREMITY WITH FAT LAYER EXPOSED (HCC): ICD-10-CM

## 2022-01-13 DIAGNOSIS — I10 ESSENTIAL HYPERTENSION: ICD-10-CM

## 2022-01-13 DIAGNOSIS — L91.0 KELOID: Primary | ICD-10-CM

## 2022-01-13 DIAGNOSIS — G83.9 PARALYSIS (HCC): ICD-10-CM

## 2022-01-13 DIAGNOSIS — Q05.9 SPINA BIFIDA, UNSPECIFIED HYDROCEPHALUS PRESENCE, UNSPECIFIED SPINAL REGION (HCC): ICD-10-CM

## 2022-01-13 PROCEDURE — 99215 OFFICE O/P EST HI 40 MIN: CPT | Performed by: NURSE PRACTITIONER

## 2022-01-13 PROCEDURE — 11042 DBRDMT SUBQ TIS 1ST 20SQCM/<: CPT

## 2022-01-13 PROCEDURE — 11106 INCAL BX SKN SINGLE LES: CPT | Performed by: NURSE PRACTITIONER

## 2022-01-13 PROCEDURE — 99214 OFFICE O/P EST MOD 30 MIN: CPT

## 2022-01-13 PROCEDURE — 88311 DECALCIFY TISSUE: CPT

## 2022-01-13 PROCEDURE — 88305 TISSUE EXAM BY PATHOLOGIST: CPT

## 2022-01-13 ASSESSMENT — VISUAL ACUITY: OU: 1

## 2022-01-13 NOTE — HOME CARE
[address] [facility]. [address] Yancy Cholo Kellynlaademetrius 14 Y:[phone] f:[phone]     Ordering Center:     [address] [place] [address],[address]  [address] [facility] [address] [address] [phone]  WOUND CARE Dept: [address] [place] [phone]    Patient Information:      Octaviano Manual  [address] [facility]   [phone]   : 1985  AGE: 39 y.o. GENDER: male   EPISODE DATE: 2022    Insurance:      PRIMARY INSURANCE:  Plan: MEDICAID [place]  Coverage: MEDICAID [place]  Effective Date: 2020  Group Number: [unfilled]  Subscriber Number: [id number] - (Medicaid)    Payor/Plan Subscr  Sex Relation Sub. Ins. ID Effective Group Num   1. [address]* 1985 Male Self [id number]                                    [address]       Patient Wound Information:      Problem List Items Addressed This Visit     Spina bifida ([address]    Paralysis ([address]    Essential hypertension    * (Principal) Keloid - Primary    Relevant Orders    Surgical Pathology    Surgical Pathology    Ulcer of left lower extremity with fat layer exposed ([address]    Relevant Orders    Surgical Pathology    VL LOWER EXTREMITY ARTERIAL SEGMENTAL PRESSURES W PPG    Surgical Pathology          WOUNDS REQUIRING DRESSING SUPPLIES:     Wound 22 Knee Anterior; Left wound 1- left knee (Active)   Wound Image    22 0837   Wound Etiology Other 22 0837   Dressing Status Clean;Dry; Intact 22 0837   Wound Cleansed Soap and water 22 0837   Dressing/Treatment Hydrofera blue 22 0838   Wound Length (cm) 2 cm 22 0837   Wound Width (cm) 2.8 cm 22 0837   Wound Depth (cm) 0.1 cm 22 0837   Wound Surface Area (cm^2) 5.6 cm^2 22 0837   Wound Volume (cm^3) 0.56 cm^3 22 0837   Post-Procedure Length (cm) 2 cm 22 0824   Post-Procedure Width (cm) 2.8 cm 22 3427 Post-Procedure Depth (cm) 0.1 cm 01/13/22 0838   Post-Procedure Surface Area (cm^2) 5.6 cm^2 01/13/22 0838   Post-Procedure Volume (cm^3) 0.56 cm^3 01/13/22 0838   Wound Assessment Slough;[place]/red 01/13/22 0838   Drainage Amount Moderate 01/13/22 0838   Drainage Description Sanguinous 01/13/22 0838   Odor None 01/13/22 0838   Marsha-wound Assessment Intact 01/13/22 0838   Margins Defined edges 01/13/22 0838   Wound Thickness Description not for Pressure Injury Full thickness 01/13/22 0838   Number of days: 0          Supplies Requested :      WOUND #: 1   PRIMARY DRESSING:  Other: hydrafera blue silicone border   Cover and Secure with: None     FREQUENCY OF DRESSING CHANGES:  Daily         ADDITIONAL ITEMS:  [] Gloves Small  [x] Gloves Medium [] Gloves Large [] Gloves XLarge  [] Tape 1\" [x] Tape 2\" [] Tape 3\"  [x] Medipore Tape  [] Saline  [] Skin Prep   [] Adhesive Remover   [] Cotton Tip Applicators   [] Other:    Patient Wound(s) Debrided: [x] Yes if yes please add date 1/13/22   [] No    Debribement Type: Excisional/Sharp and Mechanical     Is the patient currently on an antibiotic for their Wound(s): [] Yes if yes please add name and dose    [x] No    Patient currently being seen by Home Health: [] Yes   [x] No    Duration for needed supplies:  []15  [x]30  []60  []90 Days    Electronically signed by DEVON Petty CNP on 1/13/2022 at 10:55 AM     Provider Information:      PROVIDER'S NAME: Miriam OSORIO    NPI: [id number]

## 2022-01-13 NOTE — PROGRESS NOTES
Cranberry 200 MG CAPS Take 1 capsule by mouth daily      lisinopril (PRINIVIL;ZESTRIL) 5 MG tablet TAKE (1) TABLET DAILY FOR BLOOD PRESSURE. 30 tablet 11    menthol-zinc oxide (CALMOSEPTINE) 0.44-20.6 % OINT ointment Apply topically daily Max 30 ml per day.  aspirin 81 MG chewable tablet Take 81 mg by mouth daily.  Catheters MISC CATHETER 6 TIMES DAILY IN & OUT *Q05.9* 180 each 5    Blood Pressure Monitoring [place] Disabled patient who doesn't drive needs BP monitor for home for parents to check it for him 1 Device 0     No current facility-administered medications for this encounter. Allergies: Latex, Ceftriaxone, Pravastatin, and Vancomycin  Past Medical History:   Diagnosis Date    Anxiety     Chronic kidney disease     kidney stones started at age 5, treated at [facility].   Has had lithotripsy and surgery to remove stones    Club Foot     [facility]     Hypertension     diagnosed at age 6    Leg fracture, left     while in body cast due to swelling    Myelomeningocele with hydrocephalus ([facility] 1985    Open spina bifida at birth   Smith Neuropathy     Pressure sore on ankle     and knee     Scoliosis     Spastic neurogenic bladder     cather x 5 a day      (ventriculoperitoneal) shunt status        Past Surgical History:   Procedure Laterality Date    HERNIA REPAIR      groin    HIP SURGERY Left     LITHOTRIPSY      VENTRICULOPERITONEAL SHUNT      with revisions      Family History   Problem Relation Age of Onset    Colon Cancer Mother 54        In remission    High Cholesterol Mother     High Blood Pressure Mother     Thyroid Disease Mother         Hypothyroidism    Coronary Art Dis Maternal Grandmother         CABG x 2    Heart Attack Maternal Grandmother     High Blood Pressure Maternal Grandmother     Cancer Maternal Grandmother         Pancreatic Cancer    Heart Failure Maternal Grandfather          in     Immunodeficiency Neg Hx Social History     Tobacco Use    Smoking status: Never Smoker    Smokeless tobacco: Never Used   Substance Use Topics    Alcohol use: No         Review of Systems    Review of Systems   Unable to perform ROS: Other (patient is unable to sit up and move independently in the bed without assistance dueto spina bifida)   Genitourinary:        Has to self-cath     Musculoskeletal:        Severe muscle deterioration to bilateral legs   Skin: Positive for wound. Neurological:        Able to move arms only       All other review of systems are negative. Physical Exam    BP (!) 160/96   Pulse 128   Temp 98.3 °F (36.8 °C) (Temporal)   Resp 20   Ht 4' (1.219 m)   Wt 72 lb (32.7 kg)   BMI 21.97 kg/m²     Physical Exam  Vitals reviewed. Exam conducted with a chaperone present. Constitutional:       Appearance: He is normal weight. HENT:      Head: Normocephalic and atraumatic. Right Ear: External ear normal.      Left Ear: External ear normal.   Eyes:      General: Lids are normal. Lids are everted, no foreign bodies appreciated. Vision grossly intact. Gaze aligned appropriately. Cardiovascular:      Rate and Rhythm: Regular rhythm. Tachycardia present. Pulses: Normal pulses. Pulmonary:      Effort: Pulmonary effort is normal.      Breath sounds: Normal breath sounds. Skin:     General: Skin is warm and dry. Capillary Refill: Capillary refill takes 2 to 3 seconds. Findings: Lesion and wound present. Neurological:      Mental Status: He is alert and oriented to person, place, and time. Psychiatric:         Mood and Affect: Mood normal.         Behavior: Behavior normal.         Thought Content: Thought content normal.         Judgment: Judgment normal.       Procedure  Biopsy Note    Performed by: DEVON Baugh CNP    Consent obtained?  Yes    Time out taken:Yes    Pain Control:      Location of Biopsy:  Wound/Ulcer Number(s)  Wound/Ulcer # 1    Wound 01/13/22 Knee Anterior; Left wound 1- left knee (Active)   Wound Image    01/13/22 0837   Wound Etiology Other 01/13/22 0837   Dressing Status Clean;Dry; Intact 01/13/22 0837   Wound Cleansed Soap and water 01/13/22 0837   Dressing/Treatment Hydrofera blue 01/13/22 0838   Wound Length (cm) 2 cm 01/13/22 0837   Wound Width (cm) 2.8 cm 01/13/22 0837   Wound Depth (cm) 0.1 cm 01/13/22 0837   Wound Surface Area (cm^2) 5.6 cm^2 01/13/22 0837   Wound Volume (cm^3) 0.56 cm^3 01/13/22 0837   Post-Procedure Length (cm) 2 cm 01/13/22 0838   Post-Procedure Width (cm) 2.8 cm 01/13/22 0838   Post-Procedure Depth (cm) 0.1 cm 01/13/22 0838   Post-Procedure Surface Area (cm^2) 5.6 cm^2 01/13/22 0838   Post-Procedure Volume (cm^3) 0.56 cm^3 01/13/22 0838   Wound Assessment Slough;[place]/red 01/13/22 0838   Drainage Amount Moderate 01/13/22 0838   Drainage Description Sanguinous 01/13/22 0838   Odor None 01/13/22 0838   Marsha-wound Assessment Intact 01/13/22 0838   Margins Defined edges 01/13/22 0838   Wound Thickness Description not for Pressure Injury Full thickness 01/13/22 0838   Number of days: 0         Biopsy performed with: #15 blade     Instruments:forceps and silver nitrate     Specimen sent to Pathology:Yes    Bleeding: with a moderate amount of bleeding    Hemostasis Achieved:by pressure and by silver nitrate stick    Procedural Pain:0  / 10     Post Procedural Pain:0 / 10     Response to treatment:Well tolerated by patient. Assessment    1. Keloid    2. Ulcer of left lower extremity with fat layer exposed ([address]    3. Essential hypertension    4. Spina bifida, unspecified hydrocephalus presence, unspecified spinal region ([address]    5. Paralysis ([address]          Plan    1. ELHAM    Plan for wound - Dress per physician order  Treatment:     Compression : No   Offloading : Yes   Dressing : hydrafera blue    Discussed importance of nutrition, offloading, wound care, and plan of care. Patient understanding and questions answered.      I spent Center during business hours:    * Increase in Pain  * Temperature over 101  * Increase in drainage from your wound  * Drainage with a foul odor  * Bleeding  * Increase in swelling  * Need for compression bandage changes due to slippage, breakthrough drainage. If you need medical attention outside of the business hours of the [address] [place] [address] Road please contact your PCP or go to the nearest emergency room.

## 2022-01-15 ENCOUNTER — HOSPITAL ENCOUNTER (EMERGENCY)
Age: 37
Discharge: HOME | End: 2022-01-15
Attending: EMERGENCY MEDICINE
Payer: MEDICAID

## 2022-01-15 VITALS
OXYGEN SATURATION: 96 % | SYSTOLIC BLOOD PRESSURE: 162 MMHG | TEMPERATURE: 97.6 F | HEART RATE: 137 BPM | DIASTOLIC BLOOD PRESSURE: 86 MMHG | RESPIRATION RATE: 18 BRPM

## 2022-01-15 DIAGNOSIS — Z48.89 ENCOUNTER FOR POST SURGICAL WOUND CHECK: Primary | ICD-10-CM

## 2022-01-15 PROCEDURE — 99281 EMR DPT VST MAYX REQ PHY/QHP: CPT

## 2022-01-15 ASSESSMENT — ENCOUNTER SYMPTOMS
APNEA: 0
EYE DISCHARGE: 0
BACK PAIN: 0
COLOR CHANGE: 0
EYE ITCHING: 0
PHOTOPHOBIA: 0
COUGH: 0
SHORTNESS OF BREATH: 0

## 2022-01-15 NOTE — ED PROVIDER NOTES
[facility] EMERGENCY DEPT  eMERGENCYdEPARTMENT eNCOUnter      Pt Name: Rachel Villegas  MRN: [id number]  [place] 1985  Date of evaluation: 1/15/2022  Provider:TEDDY Davis    CHIEF COMPLAINT       Chief Complaint   Patient presents with    Wound Check     Left leg biopsy on Tuesday and here for a wound check         HISTORY OF PRESENT ILLNESS  (Location/Symptom, Timing/Onset, Context/Setting, Quality, Duration, Modifying Factors, Severity.)   Rachel Villegas is a 39 y.o. male who presents to the emergency department with complaints of wound check to left knee has growth that was recently biopsied Thursday here to confirm no infection. There is old blood around area I have looked at before and after procedure note that they have on their phone accessing his chart looks similar no fever chills or foul smell. No erythema. Patient is paraplegic so cannot confirm if painful. Has appointment with wound care Thursday. HPI    Nursing Notes were reviewed and I agree. REVIEW OF SYSTEMS    (2-9 systems for level 4, 10 or more for level 5)     Review of Systems   Constitutional: Negative for activity change, appetite change, chills and fever. HENT: Negative for congestion and dental problem. Eyes: Negative for photophobia, discharge and itching. Respiratory: Negative for apnea, cough and shortness of breath. Cardiovascular: Negative for chest pain. Musculoskeletal: Negative for arthralgias, back pain, gait problem, myalgias and neck pain. Skin: Positive for wound. Negative for color change, pallor and rash. Neurological: Negative for dizziness, seizures and syncope. Psychiatric/Behavioral: Negative for agitation. The patient is not nervous/anxious. Except as noted above the remainder of the review of systems was reviewed and negative.        PAST MEDICAL HISTORY     Past Medical History:   Diagnosis Date    Anxiety     Chronic kidney disease     kidney stones started at age 5, treated at [facility]. Has had lithotripsy and surgery to remove stones    Club Foot     [facility] [address]     Hypertension     diagnosed at age 6    Leg fracture, left     while in body cast due to swelling    Myelomeningocele with hydrocephalus ([facility] [address] 1985    Open spina bifida at birth   [facility] Neuropathy     Pressure sore on ankle     and knee     Scoliosis     Spastic neurogenic bladder     cather x 5 a day      (ventriculoperitoneal) shunt status          SURGICAL HISTORY       Past Surgical History:   Procedure Laterality Date    HERNIA REPAIR      groin    HIP SURGERY Left     LITHOTRIPSY      VENTRICULOPERITONEAL SHUNT      with revisions          CURRENT MEDICATIONS       Previous Medications    ASPIRIN 81 MG CHEWABLE TABLET    Take 81 mg by mouth daily. BLOOD PRESSURE MONITORING [address]    Disabled patient who doesn't drive needs BP monitor for home for parents to check it for him    BUSPIRONE (BUSPAR) 5 MG TABLET    TAKE 1 TABLET TWICE DAILY AS NEEDED FOR ANXIETY    CATHETERS MISC    CATHETER 6 TIMES DAILY IN & OUT *Q05.9*    CRANBERRY 200 MG CAPS    Take 1 capsule by mouth daily    D-MANNOSE POWD    1 Units by Does not apply route 2 times daily    LISINOPRIL (PRINIVIL;ZESTRIL) 5 MG TABLET    TAKE (1) TABLET DAILY FOR BLOOD PRESSURE. MENTHOL-ZINC OXIDE (CALMOSEPTINE) 0.44-20.6 % OINT OINTMENT    Apply topically daily Max 30 ml per day.     OMEGA-3 FATTY ACIDS (THE VERY FINEST FISH OIL) LIQD    TAKE 3 ML BY MOUTH DAILY    SOLIFENACIN (VESICARE) 5 MG TABLET    Take 2 tablets by mouth daily       ALLERGIES     Latex, Ceftriaxone, Pravastatin, and Vancomycin    FAMILY HISTORY       Family History   Problem Relation Age of Onset    Colon Cancer Mother 54        In remission    High Cholesterol Mother     High Blood Pressure Mother     Thyroid Disease Mother         Hypothyroidism    Coronary Art Dis Maternal Grandmother         CABG x 2    Heart Attack Maternal Grandmother  High Blood Pressure Maternal Grandmother     Cancer Maternal Grandmother         Pancreatic Cancer    Heart Failure Maternal Grandfather          in 2012    Immunodeficiency Neg Hx           SOCIAL HISTORY       Social History     Socioeconomic History    Marital status: Single     Spouse name: Not on file    Number of children: Not on file    Years of education: Not on file    Highest education level: Not on file   Occupational History    Not on file   Tobacco Use    Smoking status: Never Smoker    Smokeless tobacco: Never Used   Vaping Use    Vaping Use: Never used   Substance and Sexual Activity    Alcohol use: No    Drug use: No    Sexual activity: Never   Other Topics Concern    Not on file   Social History Narrative    Adult living with his mother, does his own transfers, sleeps on twin mattress on the floor     Not working, on disability, enjoys painting, goes to [facility] most days     Social Determinants of Health     Financial Resource Strain: Low Risk     Difficulty of Paying Living Expenses: Not hard at all   Food Insecurity: No Food Insecurity    Worried About [address] in the Last Year: Never true    [address] in the Last Year: Never true   Transportation Needs: No Transportation Needs    Lack of Transportation (Medical): No    Lack of Transportation (Non-Medical):  No   Physical Activity:     Days of Exercise per Week: Not on file    Minutes of Exercise per Session: Not on file   Stress:     Feeling of Stress : Not on file   Social Connections:     Frequency of Communication with Friends and Family: Not on file    Frequency of Social Gatherings with Friends and Family: Not on file    Attends Zoroastrian Services: Not on file    Active Member of Clubs or Organizations: Not on file    Attends Club or Organization Meetings: Not on file    Marital Status: Not on file   Intimate Partner Violence:     Fear of Current or Ex-Partner: Not on file   [facility] Emotionally Abused: Not on file    Physically Abused: Not on file    Sexually Abused: Not on file   Housing Stability:     Unable to Pay for Housing in the Last Year: Not on file    Number of Places Lived in the Last Year: Not on file    Unstable Housing in the Last Year: Not on file       SCREENINGS           PHYSICAL EXAM    (up to 7 forlevel 4, 8 or more for level 5)     ED Triage Vitals [01/15/22 1303]   BP Temp Temp Source Pulse Resp SpO2 Height Weight   (!) 162/86 97.6 °F (36.4 °C) Oral 137 18 96 % -- --       Physical Exam  Vitals and nursing note reviewed. Constitutional:       General: He is not in acute distress. Appearance: Normal appearance. He is well-developed. He is not diaphoretic. HENT:      Head: Normocephalic and atraumatic. Right Ear: Tympanic membrane, ear canal and external ear normal.      Left Ear: Tympanic membrane, ear canal and external ear normal.      Nose: Nose normal.      Mouth/Throat:      Mouth: Mucous membranes are moist.   Eyes:      Pupils: Pupils are equal, round, and reactive to light. Neck:      Trachea: No tracheal deviation. Cardiovascular:      Rate and Rhythm: Normal rate and regular rhythm. Pulses: Normal pulses. Heart sounds: Normal heart sounds. No murmur heard. Pulmonary:      Effort: Pulmonary effort is normal.      Breath sounds: Normal breath sounds. No stridor. No wheezing. Chest:      Chest wall: No tenderness. Abdominal:      General: Abdomen is flat. Bowel sounds are normal. There is no distension. Palpations: Abdomen is soft. Tenderness: There is no abdominal tenderness. Musculoskeletal:         General: Normal range of motion. Cervical back: Normal range of motion and neck supple. Skin:     General: Skin is warm and dry. Capillary Refill: Capillary refill takes less than 2 seconds. Findings: Lesion present. No bruising or erythema. Neurological:      General: No focal deficit present. Mental Status: He is alert and oriented to person, place, and time. Mental status is at baseline. Psychiatric:         Mood and Affect: Mood normal.         Behavior: Behavior normal.         Thought Content: Thought content normal.         Judgment: Judgment normal.           DIAGNOSTIC RESULTS     RADIOLOGY:   Non-plain film images such as CT, Ultrasound and MRI are read by the radiologist. Plain radiographic images are visualized and preliminarilyinterpreted by No att. providers found with the below findings:      Interpretation per the Radiologist below, if available at the time of this note:    No orders to display       LABS:  Labs Reviewed - No data to display    All other labs were within normal range or notreturned as of this dictation. RE-ASSESSMENT        EMERGENCY DEPARTMENT COURSE and DIFFERENTIAL DIAGNOSIS/MDM:   Vitals:    Vitals:    01/15/22 1303   BP: (!) 162/86   Pulse: 137   Resp: 18   Temp: 97.6 °F (36.4 °C)   TempSrc: Oral   SpO2: 96%       MDM  No concern for infection that I can tell on physical exam I had my attending evaluate the patient separate from myself and he is agreeable the plan will be for wound dressing here and then continued care and follow-up as scheduled with wound care. PROCEDURES:    Procedures      FINAL IMPRESSION      1.  Encounter for post surgical wound check          DISPOSITION/PLAN   DISPOSITION Decision To Discharge 01/15/2022 01:55:08 PM      PATIENT REFERRED TO:  [facility] EMERGENCY DEPT  Dino Ambrose  [phone]    If symptoms worsen      DISCHARGE MEDICATIONS:  New Prescriptions    No medications on file       (Please note that portions of this note were completed with a voice recognition program.  Efforts were made to edit the dictations but occasionallywords are mis-transcribed.)    Alvie Nissen, [address] [place] [address]  01/15/22 7006

## 2022-01-20 ENCOUNTER — HOSPITAL ENCOUNTER (OUTPATIENT)
Dept: NON INVASIVE DIAGNOSTICS | Age: 37
Discharge: HOME | End: 2022-01-20
Payer: MEDICAID

## 2022-01-20 ENCOUNTER — HOSPITAL ENCOUNTER (OUTPATIENT)
Dept: WOUND CARE | Age: 37
Discharge: HOME | End: 2022-01-20
Payer: MEDICAID

## 2022-01-20 VITALS
SYSTOLIC BLOOD PRESSURE: 156 MMHG | HEIGHT: 60 IN | RESPIRATION RATE: 16 BRPM | HEART RATE: 117 BPM | WEIGHT: 72 LBS | DIASTOLIC BLOOD PRESSURE: 94 MMHG | BODY MASS INDEX: 14.14 KG/M2 | TEMPERATURE: 97 F

## 2022-01-20 DIAGNOSIS — L97.922 ULCER OF LEFT LOWER EXTREMITY WITH FAT LAYER EXPOSED (HCC): ICD-10-CM

## 2022-01-20 DIAGNOSIS — S81.002D OPEN KNEE WOUND, LEFT, SUBSEQUENT ENCOUNTER: Primary | Chronic | ICD-10-CM

## 2022-01-20 DIAGNOSIS — L91.0 KELOID: ICD-10-CM

## 2022-01-20 PROCEDURE — 93923 UPR/LXTR ART STDY 3+ LVLS: CPT

## 2022-01-20 PROCEDURE — 97597 DBRDMT OPN WND 1ST 20 CM/<: CPT

## 2022-01-20 PROCEDURE — 97597 DBRDMT OPN WND 1ST 20 CM/<: CPT | Performed by: SURGERY

## 2022-01-20 ASSESSMENT — PAIN SCALES - GENERAL: PAINLEVEL_OUTOF10: 0

## 2022-01-20 NOTE — PROGRESS NOTES
[address]   Progress Note and Procedure Note      [facility] RECORD NUMBER:  [id number]  AGE: 39 y.o. GENDER: male  : 1985  EPISODE DATE:  2022    Subjective:     Chief Complaint   Patient presents with    Wound Check     Wound Check         HISTORY of PRESENT ILLNESS DALIA Villegas is a 39 y.o. male who presents today for wound/ulcer evaluation. Wound Context: Pt with L knee wound here for eval/treat  Wound/Ulcer Pain Timing/Severity: none  Quality of pain: N/A  Severity:  0 / 10   Modifying Factors: None  Associated Signs/Symptoms: none    Ulcer Identification:  Ulcer Type: pressure  Contributing Factors: chronic pressure, decreased mobility and shear force    Wound: pressure        PAST MEDICAL HISTORY        Diagnosis Date    Anxiety     Chronic kidney disease     kidney stones started at age 5, treated at [facility].   Has had lithotripsy and surgery to remove stones    Club Foot     [facility] ([facility] [address]     Hypertension     diagnosed at age 6    Leg fracture, left     while in body cast due to swelling    Myelomeningocele with hydrocephalus ([facility] [address] 1985    Open spina bifida at birth   [facility] Neuropathy     Pressure sore on ankle     and knee     Scoliosis     Spastic neurogenic bladder     cather x 5 a day      (ventriculoperitoneal) shunt status        PAST SURGICAL HISTORY    Past Surgical History:   Procedure Laterality Date    HERNIA REPAIR      groin    HIP SURGERY Left     LITHOTRIPSY      VENTRICULOPERITONEAL SHUNT      with revisions        FAMILY HISTORY    Family History   Problem Relation Age of Onset    Colon Cancer Mother 54        In remission    High Cholesterol Mother     High Blood Pressure Mother     Thyroid Disease Mother         Hypothyroidism    Coronary Art Dis Maternal Grandmother         CABG x 2    Heart Attack Maternal Grandmother     High Blood Pressure Maternal Grandmother     Cancer Maternal Grandmother         Pancreatic Cancer    Heart Failure Maternal Grandfather          in     Immunodeficiency Neg Hx        SOCIAL HISTORY    Social History     Tobacco Use    Smoking status: Never Smoker    Smokeless tobacco: Never Used   Vaping Use    Vaping Use: Never used   Substance Use Topics    Alcohol use: No    Drug use: No       ALLERGIES    Allergies   Allergen Reactions    Latex Swelling and Rash     Had reaction at dentist office-redness, swelling, and rash  Other reaction(s): rash  Had reaction at dentist office-redness, swelling, and rash    Ceftriaxone Other (See Comments)     Other reaction(s): Other (see comments)  Fever, hallucinations was transported via ambulance [address]   Other reaction(s): high fever  Fever, hallucinations was transported via ambulance [place]     Pravastatin      Myalgias    Vancomycin Other (See Comments)     Other reaction(s): Other (See Comments)  Red warm hands and itching  Red warm hands and itching       MEDICATIONS    Current Outpatient Medications on File Prior to Encounter   Medication Sig Dispense Refill    Omega-3 Fatty Acids (THE VERY FINEST FISH OIL) LIQD TAKE 3 ML BY MOUTH DAILY 200 mL 5    D-Mannose POWD 1 Units by Does not apply route 2 times daily      busPIRone (BUSPAR) 5 MG tablet TAKE 1 TABLET TWICE DAILY AS NEEDED FOR ANXIETY 60 tablet 11    Catheters MISC CATHETER 6 TIMES DAILY IN & OUT *Q05.9* 180 each 5    solifenacin (VESICARE) 5 MG tablet Take 2 tablets by mouth daily (Patient taking differently: Take 5 mg by mouth daily ) 45 tablet 11    Cranberry 200 MG CAPS Take 1 capsule by mouth daily      lisinopril (PRINIVIL;ZESTRIL) 5 MG tablet TAKE (1) TABLET DAILY FOR BLOOD PRESSURE. 30 tablet 11    menthol-zinc oxide (CALMOSEPTINE) 0.44-20.6 % OINT ointment Apply topically daily Max 30 ml per day.       Blood Pressure Monitoring [address] Disabled patient who doesn't drive needs BP monitor for home for parents to check it for him 1 Device 0    aspirin 81 MG chewable tablet Take 81 mg by mouth daily. No current facility-administered medications on file prior to encounter. REVIEW OF SYSTEMS    A comprehensive review of systems was negative.     Objective:      BP (!) 156/94   Pulse 117   Temp 97 °F (36.1 °C) (Temporal)   Resp 16   Ht 4' (1.219 m)   Wt 72 lb (32.7 kg)   BMI 21.97 kg/m²     Wt Readings from Last 3 Encounters:   01/20/22 72 lb (32.7 kg)   01/13/22 72 lb (32.7 kg)   08/03/21 72 lb (32.7 kg)       PHYSICAL EXAM    General Appearance: alert and oriented to person, place and time, well developed and well- nourished, in no acute distress  Skin: warm and dry, no rash or erythema  Head: normocephalic and atraumatic  Eyes: pupils equal, round, and reactive to light, extraocular eye movements intact, conjunctivae normal  ENT: tympanic membrane, external ear and ear canal normal bilaterally, nose without deformity, nasal mucosa and turbinates normal without polyps, lips teeth and gums normal  Neck: supple and non-tender without mass, no thyromegaly or thyroid nodules, no cervical lymphadenopathy  Pulmonary/Chest: clear to auscultation bilaterally- no wheezes, rales or rhonchi, normal air movement, no respiratory distress  Cardiovascular: normal rate, regular rhythm, normal S1 and S2, no murmurs, rubs, clicks, or gallops, distal pulses intact, no carotid bruits  Abdomen: soft, non-tender, non-distended, normal bowel sounds, no masses or organomegaly  Extremities: no cyanosis, clubbing or edema  Musculoskeletal: normal range of motion, no joint swelling, deformity or tenderness      Assessment:      Problem List Items Addressed This Visit     * (Principal) Open knee wound, left, subsequent encounter - Primary (Chronic)    Keloid           Procedure Note  Indications:  Based on my examination of this patient's wound(s)/ulcer(s) today, debridement is required to promote healing and evaluate the wound base.    Performed by: Caleb Ramachandran MD    Consent obtained:  Yes    Time out taken:  Yes    Pain Control: Anesthetic  Anesthetic: None       Debridement:Non-excisional Debridement    Using curette the wound(s)/ulcer(s) was/were sharply debrided down through and including the removal of epidermis and dermis. Devitalized Tissue Debrided:  fibrin, biofilm, slough, necrotic/eschar and exudate      Pre Debridement Measurements:  Are located in the Wound/Ulcer Documentation Flow Sheet    Wound/Ulcer #: 1    Percent of Wound(s)/Ulcer(s) Debrided: 100%    Total Surface Area Debrided:  1.2 sq cm       Diabetic/Pressure/Non Pressure Ulcers only:  Ulcer: Pressure ulcer, Stage 3             Post Debridement Measurements:    Wound/Ulcer Descriptions are Pre Debridement --EXCEPT MEASUREMENTS    Wound 01/13/22 Knee Anterior; Left wound 1- left knee (Active)   Wound Image   01/20/22 1556   Wound Etiology Pressure Stage  3 01/20/22 1556   Dressing Status Old drainage noted 01/20/22 1556   Wound Cleansed Irrigated with saline 01/20/22 1556   Dressing/Treatment Hydrofera blue 01/13/22 0838   Wound Length (cm) 1.2 cm 01/20/22 1556   Wound Width (cm) 1 cm 01/20/22 1556   Wound Depth (cm) 0.1 cm 01/20/22 1556   Wound Surface Area (cm^2) 1.2 cm^2 01/20/22 1556   Change in Wound Size % (l*w) 78.57 01/20/22 1556   Wound Volume (cm^3) 0.12 cm^3 01/20/22 1556   Wound Healing % 79 01/20/22 1556   Post-Procedure Length (cm) 1.2 cm 01/20/22 1556   Post-Procedure Width (cm) 1 cm 01/20/22 1556   Post-Procedure Depth (cm) 0.1 cm 01/20/22 1556   Post-Procedure Surface Area (cm^2) 1.2 cm^2 01/20/22 1556   Post-Procedure Volume (cm^3) 0.12 cm^3 01/20/22 1556   Wound Assessment Slough 01/20/22 1556   Drainage Amount Moderate 01/20/22 1556   Drainage Description Serosanguinous 01/20/22 1556   Odor None 01/20/22 1556   Marsha-wound Assessment Intact 01/20/22 1556   Margins Defined edges 01/20/22 1556   Wound Thickness Description not for Pressure Injury Full thickness 01/20/22 1556   Number of days: 7             Estimated Blood Loss:  Minimal    Hemostasis Achieved:  by pressure and by silver nitrate stick    Procedural Pain:  0  / 10     Post Procedural Pain:  0 / 10     Response to treatment:  Well tolerated by patient. Plan:     Problem List Items Addressed This Visit     * (Principal) Open knee wound, left, subsequent encounter - Primary (Chronic)    Keloid          ELHAM OK pt needs knee pads at all times due to crawling to ambulate. RTO 1 week    Treatment Note please see attached Discharge Instructions    In my professional opinion this patient would benefit from HBO Therapy: No    Written patient dismissal instructions given to patient and signed by patient or POA. Discharge [address] and Hyperbaric Oxygen Therapy   Physician Orders and Discharge Instructions  [address]  Telephone: [phone]    NAME:  Yessy Hernandez:  1985  MEDICAL RECORD NUMBER:  [id number]  DATE:  1/20/2022    Discharge condition: Stable    Discharge to: Home    Left via:Private automobile    Accompanied by: Mother      ECF/HHA: [facility]     Dressing Orders:  Left knee wound: Soap and water wash,   Apply hydrafera blue ready silicone border- change every 2-3 days and as needed. A few signs and systems of infection to watch for, increased redness, drainage, foul smell or warm to the touch, could also develop fever, chills, night sweats or any other signs and systems of infection   Treatment Orders: May try knee pads to reduce pressure to the area   Protein rich diet  Multivitamin     [facility] follow up visit _____________1 week________________  (Please note your next appointment above and if you are unable to keep, kindly give a 24 hour notice.  Thank you.)    If you experience any of the following, please call the [address] during business hours:    * Increase in Pain  * Temperature over 101  * Increase in drainage from your wound  * Drainage with a foul odor  * Bleeding  * Increase in swelling  * Need for compression bandage changes due to slippage, breakthrough drainage. If you need medical attention outside of the business hours of the [email or web address] please contact your PCP or go to the nearest emergency room.         Electronically signed by Araseli Weinstein MD on 1/20/2022 at 4:27 PM

## 2022-01-28 ENCOUNTER — HOSPITAL ENCOUNTER (OUTPATIENT)
Dept: WOUND CARE | Age: 37
Discharge: HOME | End: 2022-01-28
Payer: MEDICAID

## 2022-01-28 VITALS
SYSTOLIC BLOOD PRESSURE: 156 MMHG | TEMPERATURE: 98.6 F | RESPIRATION RATE: 16 BRPM | HEART RATE: 119 BPM | DIASTOLIC BLOOD PRESSURE: 98 MMHG

## 2022-01-28 DIAGNOSIS — S81.002D OPEN KNEE WOUND, LEFT, SUBSEQUENT ENCOUNTER: ICD-10-CM

## 2022-01-28 DIAGNOSIS — L91.0 KELOID: ICD-10-CM

## 2022-01-28 PROCEDURE — 97597 DBRDMT OPN WND 1ST 20 CM/<: CPT | Performed by: SURGERY

## 2022-01-28 PROCEDURE — 97597 DBRDMT OPN WND 1ST 20 CM/<: CPT

## 2022-01-28 ASSESSMENT — PAIN SCALES - GENERAL: PAINLEVEL_OUTOF10: 0

## 2022-01-28 NOTE — PROGRESS NOTES
[address]   Progress Note and Procedure Note      [address] [facility] RECORD NUMBER:  [id number]  AGE: 39 y.o. GENDER: male  : 1985  EPISODE DATE:  2022    Subjective:     Chief Complaint   Patient presents with    Wound Check     Wound Check         HISTORY of PRESENT ILLNESS HPI     Chel Palomares is a 39 y.o. male who presents today for wound/ulcer evaluation. Wound Context: Pt with L knee wound here for eval/treat  Wound/Ulcer Pain Timing/Severity: none  Quality of pain: N/A  Severity:  0 / 10   Modifying Factors: None  Associated Signs/Symptoms: none    Ulcer Identification:  Ulcer Type: pressure  Contributing Factors: chronic pressure and shear force    Wound: pressure        PAST MEDICAL HISTORY        Diagnosis Date    Anxiety     Chronic kidney disease     kidney stones started at age 5, treated at [facility].   Has had lithotripsy and surgery to remove stones    Club Foot     Congenital paraplegia ([address]     Hypertension     diagnosed at age 6    Leg fracture, left     while in body cast due to swelling    Myelomeningocele with hydrocephalus ([address] 1985    Open spina bifida at birth   Smith Neuropathy     Pressure sore on ankle     and knee     Scoliosis     Spastic neurogenic bladder     cather x 5 a day      (ventriculoperitoneal) shunt status        PAST SURGICAL HISTORY    Past Surgical History:   Procedure Laterality Date    HERNIA REPAIR      groin    HIP SURGERY Left     LITHOTRIPSY      VENTRICULOPERITONEAL SHUNT      with revisions        FAMILY HISTORY    Family History   Problem Relation Age of Onset    Colon Cancer Mother 54        In remission    High Cholesterol Mother     High Blood Pressure Mother     Thyroid Disease Mother         Hypothyroidism    Coronary Art Dis Maternal Grandmother         CABG x 2    Heart Attack Maternal Grandmother     High Blood Pressure Maternal Grandmother     Cancer Maternal Grandmother         Pancreatic Cancer    Heart Failure Maternal Grandfather          in     Immunodeficiency Neg Hx        SOCIAL HISTORY    Social History     Tobacco Use    Smoking status: Never Smoker    Smokeless tobacco: Never Used   Vaping Use    Vaping Use: Never used   Substance Use Topics    Alcohol use: No    Drug use: No       ALLERGIES    Allergies   Allergen Reactions    Latex Swelling and Rash     Had reaction at dentist office-redness, swelling, and rash  Other reaction(s): rash  Had reaction at dentist office-redness, swelling, and rash    Ceftriaxone Other (See Comments)     Other reaction(s): Other (see comments)  Fever, hallucinations was transported via ambulance [address]   Other reaction(s): high fever  Fever, hallucinations was transported via ambulance [place]     Pravastatin      Myalgias    Vancomycin Other (See Comments)     Other reaction(s): Other (See Comments)  Red warm hands and itching  Red warm hands and itching       MEDICATIONS    Current Outpatient Medications on File Prior to Encounter   Medication Sig Dispense Refill    Omega-3 Fatty Acids (THE VERY FINEST FISH OIL) LIQD TAKE 3 ML BY MOUTH DAILY 200 mL 5    D-Mannose POWD 1 Units by Does not apply route 2 times daily      busPIRone (BUSPAR) 5 MG tablet TAKE 1 TABLET TWICE DAILY AS NEEDED FOR ANXIETY 60 tablet 11    Catheters MISC CATHETER 6 TIMES DAILY IN & OUT *Q05.9* 180 each 5    solifenacin (VESICARE) 5 MG tablet Take 2 tablets by mouth daily (Patient taking differently: Take 5 mg by mouth daily ) 45 tablet 11    Cranberry 200 MG CAPS Take 1 capsule by mouth daily      lisinopril (PRINIVIL;ZESTRIL) 5 MG tablet TAKE (1) TABLET DAILY FOR BLOOD PRESSURE. 30 tablet 11    menthol-zinc oxide (CALMOSEPTINE) 0.44-20.6 % OINT ointment Apply topically daily Max 30 ml per day.       Blood Pressure Monitoring [address] Disabled patient who doesn't drive needs BP monitor for home for parents to check it for him 1 Device 0    aspirin 81 MG chewable tablet Take 81 mg by mouth daily. No current facility-administered medications on file prior to encounter. REVIEW OF SYSTEMS    A comprehensive review of systems was negative. Objective:      BP (!) 156/98   Pulse 119   Temp 98.6 °F (37 °C) (Temporal)   Resp 16     Wt Readings from Last 3 Encounters:   01/20/22 72 lb (32.7 kg)   01/13/22 72 lb (32.7 kg)   08/03/21 72 lb (32.7 kg)       PHYSICAL EXAM    General Appearance: alert and oriented to person, place and time, well developed and well- nourished, in no acute distress  Skin: warm and dry, no rash or erythema  Head: normocephalic and atraumatic  Eyes: pupils equal, round, and reactive to light, extraocular eye movements intact, conjunctivae normal  ENT: tympanic membrane, external ear and ear canal normal bilaterally, nose without deformity, nasal mucosa and turbinates normal without polyps, lips teeth and gums normal  Neck: supple and non-tender without mass, no thyromegaly or thyroid nodules, no cervical lymphadenopathy  Pulmonary/Chest: clear to auscultation bilaterally- no wheezes, rales or rhonchi, normal air movement, no respiratory distress  Cardiovascular: normal rate, regular rhythm, normal S1 and S2, no murmurs, rubs, clicks, or gallops, distal pulses intact, no carotid bruits  Abdomen: soft, non-tender, non-distended, normal bowel sounds, no masses or organomegaly  Extremities: no cyanosis, clubbing or edema  Musculoskeletal: normal range of motion, no joint swelling, deformity or tenderness      Assessment:      Problem List Items Addressed This Visit     * (Principal) Open knee wound, left, subsequent encounter (Chronic)    Keloid           Procedure Note  Indications:  Based on my examination of this patient's wound(s)/ulcer(s) today, debridement is required to promote healing and evaluate the wound base.     Performed by: Bao Banks MD    Consent obtained:  Yes    Time out taken: Yes    Pain Control: Anesthetic  Anesthetic: None       Debridement:Non-excisional Debridement    Using curette the wound(s)/ulcer(s) was/were sharply debrided down through and including the removal of epidermis and dermis. Devitalized Tissue Debrided:  fibrin, biofilm, slough, necrotic/eschar and exudate      Pre Debridement Measurements:  Are located in the Wound/Ulcer Documentation Flow Sheet    Wound/Ulcer #: 1    Percent of Wound(s)/Ulcer(s) Debrided: 100%    Total Surface Area Debrided:  1.2 sq cm       Diabetic/Pressure/Non Pressure Ulcers only:  Ulcer: Pressure ulcer, Stage 3           Post Debridement Measurements:    Wound/Ulcer Descriptions are Pre Debridement --EXCEPT MEASUREMENTS    Wound 01/13/22 Knee Anterior; Left wound 1- left knee (Active)   Wound Image   01/28/22 0821   Wound Etiology Pressure Stage  3 01/28/22 0821   Dressing Status Old drainage noted 01/28/22 0821   Wound Cleansed Irrigated with saline 01/28/22 0821   Dressing/Treatment Hydrofera blue 01/20/22 1629   Offloading for Diabetic Foot Ulcers Yes (type) 01/28/22 0821   Wound Length (cm) 1.2 cm 01/28/22 0821   Wound Width (cm) 1 cm 01/28/22 0821   Wound Depth (cm) 0.1 cm 01/28/22 0821   Wound Surface Area (cm^2) 1.2 cm^2 01/28/22 0821   Change in Wound Size % (l*w) 78.57 01/28/22 0821   Wound Volume (cm^3) 0.12 cm^3 01/28/22 0821   Wound Healing % 79 01/28/22 0821   Post-Procedure Length (cm) 1.2 cm 01/28/22 0836   Post-Procedure Width (cm) 1 cm 01/28/22 0836   Post-Procedure Depth (cm) 0.1 cm 01/28/22 0836   Post-Procedure Surface Area (cm^2) 1.2 cm^2 01/28/22 0836   Post-Procedure Volume (cm^3) 0.12 cm^3 01/28/22 0836   Wound Assessment [facility] 01/28/22 0821   Drainage Amount Moderate 01/28/22 0821   Drainage Description Serosanguinous 01/28/22 0821   Odor None 01/28/22 0821   Marsha-wound Assessment Intact 01/28/22 0821   Margins Defined edges 01/28/22 0821   Wound Thickness Description not for Pressure Injury Full thickness 01/28/22 0821   Number of days: 15             Estimated Blood Loss:  Minimal    Hemostasis Achieved:  by pressure and by silver nitrate stick    Procedural Pain:  0  / 10     Post Procedural Pain:  0 / 10     Response to treatment:  Well tolerated by patient. Plan:     Problem List Items Addressed This Visit     * (Principal) Open knee wound, left, subsequent encounter (Chronic)    Keloid          Pt still has not obtained his knee pads. Wound will not heal while crawling on knes for ambulation. Cont current care RTO 1 week    Treatment Note please see attached Discharge Instructions    In my professional opinion this patient would benefit from HBO Therapy: No    Written patient dismissal instructions given to patient and signed by patient or POA. Discharge [address] and Hyperbaric Oxygen Therapy   Physician Orders and Discharge Instructions  [address]  Telephone: [phone]    NAME:  Luis Enrique Tillman:  1985  MEDICAL RECORD NUMBER:  [id number]  DATE:  1/28/2022    Discharge condition: Stable    Discharge to: Home    Left via:Private automobile    Accompanied by: Mother      ECF/[facility]HA: [facility]     Dressing Orders:  Left knee wound: Soap and water wash,   Apply hydrafera blue ready silicone border- change every 2-3 days and as needed. A few signs and systems of infection to watch for, increased redness, drainage, foul smell or warm to the touch, could also develop fever, chills, night sweats or any other signs and systems of infection   Treatment Orders: May try knee pads to reduce pressure to the area   Protein rich diet  Multivitamin     [facility] follow up visit ____________1 week_________________  (Please note your next appointment above and if you are unable to keep, kindly give a 24 hour notice.  Thank you.)    If you experience any of the following, please call the [facility] during business hours:    * Increase in Pain  * Temperature over 101  * Increase in drainage from your wound  * Drainage with a foul odor  * Bleeding  * Increase in swelling  * Need for compression bandage changes due to slippage, breakthrough drainage. If you need medical attention outside of the business hours of the [facility] please contact your PCP or go to the nearest emergency room.         Electronically signed by Pro Roa MD on 1/28/2022 at 8:50 AM

## 2022-02-07 ENCOUNTER — HOSPITAL ENCOUNTER (OUTPATIENT)
Dept: WOUND CARE | Age: 37
Discharge: HOME | End: 2022-02-07
Payer: MEDICAID

## 2022-02-07 VITALS
TEMPERATURE: 98.1 F | HEIGHT: 60 IN | DIASTOLIC BLOOD PRESSURE: 87 MMHG | HEART RATE: 130 BPM | RESPIRATION RATE: 18 BRPM | BODY MASS INDEX: 14.14 KG/M2 | WEIGHT: 72 LBS | SYSTOLIC BLOOD PRESSURE: 141 MMHG

## 2022-02-07 DIAGNOSIS — L91.0 KELOID: ICD-10-CM

## 2022-02-07 DIAGNOSIS — I10 ESSENTIAL HYPERTENSION: ICD-10-CM

## 2022-02-07 DIAGNOSIS — S81.002D OPEN KNEE WOUND, LEFT, SUBSEQUENT ENCOUNTER: ICD-10-CM

## 2022-02-07 PROCEDURE — 97597 DBRDMT OPN WND 1ST 20 CM/<: CPT

## 2022-02-07 PROCEDURE — 97597 DBRDMT OPN WND 1ST 20 CM/<: CPT | Performed by: SURGERY

## 2022-02-07 RX ORDER — BETAMETHASONE DIPROPIONATE 0.05 %
OINTMENT (GRAM) TOPICAL ONCE
OUTPATIENT
Start: 2022-02-07 | End: 2022-02-07

## 2022-02-07 RX ORDER — LIDOCAINE 40 MG/G
CREAM TOPICAL ONCE
OUTPATIENT
Start: 2022-02-07 | End: 2022-02-07

## 2022-02-07 RX ORDER — GINSENG 100 MG
CAPSULE ORAL ONCE
OUTPATIENT
Start: 2022-02-07 | End: 2022-02-07

## 2022-02-07 RX ORDER — LIDOCAINE HYDROCHLORIDE 20 MG/ML
JELLY TOPICAL ONCE
OUTPATIENT
Start: 2022-02-07 | End: 2022-02-07

## 2022-02-07 RX ORDER — LIDOCAINE HYDROCHLORIDE 40 MG/ML
SOLUTION TOPICAL ONCE
OUTPATIENT
Start: 2022-02-07 | End: 2022-02-07

## 2022-02-07 RX ORDER — BACITRACIN, NEOMYCIN, POLYMYXIN B 400; 3.5; 5 [USP'U]/G; MG/G; [USP'U]/G
OINTMENT TOPICAL ONCE
OUTPATIENT
Start: 2022-02-07 | End: 2022-02-07

## 2022-02-07 RX ORDER — GENTAMICIN SULFATE 1 MG/G
OINTMENT TOPICAL ONCE
OUTPATIENT
Start: 2022-02-07 | End: 2022-02-07

## 2022-02-07 RX ORDER — BACITRACIN ZINC AND POLYMYXIN B SULFATE 500; 1000 [USP'U]/G; [USP'U]/G
OINTMENT TOPICAL ONCE
OUTPATIENT
Start: 2022-02-07 | End: 2022-02-07

## 2022-02-07 RX ORDER — CLOBETASOL PROPIONATE 0.5 MG/G
OINTMENT TOPICAL ONCE
OUTPATIENT
Start: 2022-02-07 | End: 2022-02-07

## 2022-02-07 RX ORDER — LISINOPRIL 5 MG/1
TABLET ORAL
Qty: 30 TABLET | Refills: 11 | Status: SHIPPED | OUTPATIENT
Start: 2022-02-07 | End: 2022-02-10 | Stop reason: SDUPTHER

## 2022-02-07 RX ORDER — LIDOCAINE 50 MG/G
OINTMENT TOPICAL ONCE
OUTPATIENT
Start: 2022-02-07 | End: 2022-02-07

## 2022-02-07 ASSESSMENT — PAIN SCALES - GENERAL: PAINLEVEL_OUTOF10: 0

## 2022-02-07 NOTE — PROGRESS NOTES
[address]   Progress Note and Procedure Note      [address] [facility] RECORD NUMBER:  [id number]  AGE: 39 y.o. GENDER: male  : 1985  EPISODE DATE:  2022    Subjective:     Chief Complaint   Patient presents with    Wound Check         HISTORY of PRESENT ILLNESS HPI     Rossy Seth is a 39 y.o. male who presents today for wound/ulcer evaluation. Wound Context: Pt with knee wound here for eval/treat  Wound/Ulcer Pain Timing/Severity: none  Quality of pain: N/A  Severity:  0 / 10   Modifying Factors: None  Associated Signs/Symptoms: none    Ulcer Identification:  Ulcer Type: pressure  Contributing Factors: shear force    Wound: pressure        PAST MEDICAL HISTORY        Diagnosis Date    Anxiety     Chronic kidney disease     kidney stones started at age 5, treated at [facility].   Has had lithotripsy and surgery to remove stones    Club Foot     [facility] parap[facility]ia ([facility] [address]     Hypertension     diagnosed at age 6    Leg fracture, left     while in body cast due to swelling    Myelomeningocele with hydrocephalus ([facility] [address] 1985    Open spina bifida at birth   Brunetta Nipper Neuropathy     Pressure sore on ankle     and knee     Scoliosis     Spastic neurogenic bladder     cather x 5 a day      (ventriculoperitoneal) shunt status        PAST SURGICAL HISTORY    Past Surgical History:   Procedure Laterality Date    HERNIA REPAIR      groin    HIP SURGERY Left     LITHOTRIPSY      VENTRICULOPERITONEAL SHUNT      with revisions        FAMILY HISTORY    Family History   Problem Relation Age of Onset    Colon Cancer Mother 54        In remission    High Cholesterol Mother     High Blood Pressure Mother     Thyroid Disease Mother         Hypothyroidism    Coronary Art Dis Maternal Grandmother         CABG x 2    Heart Attack Maternal Grandmother     High Blood Pressure Maternal Grandmother     Cancer Maternal Grandmother         Pancreatic Cancer    Heart Failure Maternal Grandfather          in     Immunodeficiency Neg Hx        SOCIAL HISTORY    Social History     Tobacco Use    Smoking status: Never Smoker    Smokeless tobacco: Never Used   Vaping Use    Vaping Use: Never used   Substance Use Topics    Alcohol use: No    Drug use: No       ALLERGIES    Allergies   Allergen Reactions    Latex Swelling and Rash     Had reaction at dentist office-redness, swelling, and rash  Other reaction(s): rash  Had reaction at dentist office-redness, swelling, and rash    Ceftriaxone Other (See Comments)     Other reaction(s): Other (see comments)  Fever, hallucinations was transported via ambulance [address]   Other reaction(s): high fever  Fever, hallucinations was transported via ambulance [place]     Pravastatin      Myalgias    Vancomycin Other (See Comments)     Other reaction(s): Other (See Comments)  Red warm hands and itching  Red warm hands and itching       MEDICATIONS    Current Outpatient Medications on File Prior to Encounter   Medication Sig Dispense Refill    Omega-3 Fatty Acids (THE VERY FINEST FISH OIL) LIQD TAKE 3 ML BY MOUTH DAILY 200 mL 5    busPIRone (BUSPAR) 5 MG tablet TAKE 1 TABLET TWICE DAILY AS NEEDED FOR ANXIETY 60 tablet 11    solifenacin (VESICARE) 5 MG tablet Take 2 tablets by mouth daily (Patient taking differently: Take 5 mg by mouth daily ) 45 tablet 11    Cranberry 200 MG CAPS Take 1 capsule by mouth daily      lisinopril (PRINIVIL;ZESTRIL) 5 MG tablet TAKE (1) TABLET DAILY FOR BLOOD PRESSURE. 30 tablet 11    aspirin 81 MG chewable tablet Take 81 mg by mouth daily.  D-Mannose POWD 1 Units by Does not apply route 2 times daily      Catheters MISC CATHETER 6 TIMES DAILY IN & OUT *Q05.9* 180 each 5    menthol-zinc oxide (CALMOSEPTINE) 0.44-20.6 % OINT ointment Apply topically daily Max 30 ml per day.       Blood Pressure Monitoring [address] Disabled patient who doesn't drive needs BP monitor for home for parents to check it for him 1 Device 0     No current facility-administered medications on file prior to encounter. REVIEW OF SYSTEMS    A comprehensive review of systems was negative.     Objective:      BP (!) 141/87   Pulse 130   Temp 98.1 °F (36.7 °C) (Temporal)   Resp 18   Ht 4' (1.219 m)   Wt 72 lb (32.7 kg)   BMI 21.97 kg/m²     Wt Readings from Last 3 Encounters:   02/07/22 72 lb (32.7 kg)   01/20/22 72 lb (32.7 kg)   01/13/22 72 lb (32.7 kg)       PHYSICAL EXAM    General Appearance: alert and oriented to person, place and time, well developed and well- nourished, in no acute distress  Skin: warm and dry, no rash or erythema  Head: normocephalic and atraumatic  Eyes: pupils equal, round, and reactive to light, extraocular eye movements intact, conjunctivae normal  ENT: tympanic membrane, external ear and ear canal normal bilaterally, nose without deformity, nasal mucosa and turbinates normal without polyps, lips teeth and gums normal  Neck: supple and non-tender without mass, no thyromegaly or thyroid nodules, no cervical lymphadenopathy  Pulmonary/Chest: clear to auscultation bilaterally- no wheezes, rales or rhonchi, normal air movement, no respiratory distress  Cardiovascular: normal rate, regular rhythm, normal S1 and S2, no murmurs, rubs, clicks, or gallops, distal pulses intact, no carotid bruits  Abdomen: soft, non-tender, non-distended, normal bowel sounds, no masses or organomegaly  Extremities: no cyanosis, clubbing or edema  Musculoskeletal: normal range of motion, no joint swelling, deformity or tenderness  Neurologic: reflexes normal and symmetric, no cranial nerve deficit, gait, coordination and speech normal, sensation of skin normal      Assessment:      Problem List Items Addressed This Visit     * (Principal) Open knee wound, left, subsequent encounter (Chronic)    Keloid           Procedure Note  Indications:  Based on my examination of this patient's wound(s)/ulcer(s) today, debridement is required to promote healing and evaluate the wound base. Performed by: Malinda Starr MD    Consent obtained:  Yes    Time out taken:  Yes    Pain Control: Anesthetic  Anesthetic: None       Debridement:Non-excisional Debridement    Using curette the wound(s)/ulcer(s) was/were sharply debrided down through and including the removal of epidermis and dermis. Devitalized Tissue Debrided:  fibrin, biofilm, slough, necrotic/eschar and exudate      Pre Debridement Measurements:  Are located in the Wound/Ulcer Documentation Flow Sheet    Wound/Ulcer #: 1    Percent of Wound(s)/Ulcer(s) Debrided: 100%    Total Surface Area Debrided:  0.02 sq cm       Diabetic/Pressure/Non Pressure Ulcers only:  Ulcer: Pressure ulcer, Stage 3             Post Debridement Measurements:    Wound/Ulcer Descriptions are Pre Debridement --EXCEPT MEASUREMENTS    Wound 01/13/22 Knee Anterior; Left wound 1- left knee (Active)   Wound Image   02/07/22 1033   Wound Etiology Pressure Stage  3 02/07/22 1033   Dressing Status Old drainage noted 02/07/22 1033   Wound Cleansed Soap and water 02/07/22 1033   Dressing/Treatment Hydrofera blue 01/20/22 1629   Offloading for Diabetic Foot Ulcers Yes (type) 01/28/22 0821   Wound Length (cm) 1.3 cm 02/07/22 1033   Wound Width (cm) 1.5 cm 02/07/22 1033   Wound Depth (cm) 0.1 cm 02/07/22 1033   Wound Surface Area (cm^2) 1.95 cm^2 02/07/22 1033   Change in Wound Size % (l*w) 65.18 02/07/22 1033   Wound Volume (cm^3) 0.195 cm^3 02/07/22 1033   Wound Healing % 65 02/07/22 1033   Post-Procedure Length (cm) 0.2 cm 02/07/22 1054   Post-Procedure Width (cm) 0.1 cm 02/07/22 1054   Post-Procedure Depth (cm) 0.1 cm 02/07/22 1054   Post-Procedure Surface Area (cm^2) 0.02 cm^2 02/07/22 1054   Post-Procedure Volume (cm^3) 0.002 cm^3 02/07/22 1054   Wound Assessment Pink/red;Eschar dry; Epithelialization 02/07/22 1033   Drainage Amount Small 02/07/22 1033   Drainage Description Serosanguinous 02/07/22 1033   Odor None 02/07/22 1033   Marsha-wound Assessment Other (Comment); Intact 02/07/22 1033   Margins Defined edges 02/07/22 1033   Wound Thickness Description not for Pressure Injury Full thickness 02/07/22 1033   Number of days: 25             Estimated Blood Loss:  Minimal    Hemostasis Achieved:  by pressure    Procedural Pain:  0  / 10     Post Procedural Pain:  0 / 10     Response to treatment:  Well tolerated by patient. Plan:     Problem List Items Addressed This Visit     * (Principal) Open knee wound, left, subsequent encounter (Chronic)    Keloid          Wound almost healed RTO 1 week Cont current care. Pt has knee pads now. Treatment Note please see attached Discharge Instructions    In my professional opinion this patient would benefit from HBO Therapy: No    Written patient dismissal instructions given to patient and signed by patient or POA. Discharge [address] and Hyperbaric Oxygen Therapy   Physician Orders and Discharge Instructions  [address]  Telephone: [phone]    NAME:  Simran Reyes:  1985  MEDICAL RECORD NUMBER:  [id number]  DATE:  2/7/2022    Discharge condition: Stable    Discharge to: Home    Left via:Private automobile    Accompanied by: Mother     ECF/HHA: [facility]     Dressing Orders:  Left knee wound: Soap and water wash,   Apply hydrafera blue ready silicone border- change every 2-3 days and as needed. A few signs and systems of infection to watch for, increased redness, drainage, foul smell or warm to the touch, could also develop fever, chills, night sweats or any other signs and systems of infection     Treatment Orders:   May try knee pads to reduce pressure to the area   Protein rich diet  Multivitamin     Preventing Falls: Care Instructions  Your Care Instructions  Getting around your home safely can be a challenge if you have injuries or health problems that make it easy for you to fall. Loose rugs and furniture in walkways are among the dangers for many older people who have problems walking or who have poor eyesight. People who have conditions such as arthritis, osteoporosis, or dementia also have to be careful not to fall. You can make your home safer with a few simple measures. Follow-up care is a key part of your treatment and safety. Be sure to make and go to all appointments, and call your doctor if you are having problems. It's also a good idea to know your test results and keep a list of the medicines you take. How can you care for yourself at home? Taking care of yourself  · You may get dizzy if you do not drink enough water. To prevent dehydration, drink plenty of fluids, enough so that your urine is light yellow or clear like water. Choose water and other caffeine-free clear liquids. If you have kidney, heart, or liver disease and have to limit fluids, talk with your doctor before you increase the amount of fluids you drink. · Exercise regularly to improve your strength, muscle tone, and balance. Walk if you can. Swimming may be a good choice if you cannot walk easily. · Have your vision and hearing checked each year or any time you notice a change. If you have trouble seeing and hearing, you might not be able to avoid objects and could lose your balance. · Know the side effects of the medicines you take. Ask your doctor or pharmacist whether the medicines you take can affect your balance. Sleeping pills or sedatives can affect your balance. · Limit the amount of alcohol you drink. Alcohol can impair your balance and other senses. · Ask your doctor whether calluses or corns on your feet need to be removed. If you wear loose-fitting shoes because of calluses or corns, you can lose your balance and fall. · Talk to your doctor if you have numbness in your feet.   Preventing falls at home  · Remove raised doorway thresholds, throw rugs, and clutter. Repair loose carpet or raised areas in the floor. · Move furniture and electrical cords to keep them out of walking paths. · Use nonskid floor wax, and wipe up spills right away, especially on ceramic tile floors. · If you use a walker or cane, put rubber tips on it. If you use crutches, clean the bottoms of them regularly with an abrasive pad, such as steel wool. · Keep your house well lit, especially [place], and outside walkways. Use night-lights in areas such as hallways and bathrooms. Add extra light switches or use remote switches (such as switches that go on or off when you clap your hands) to make it easier to turn lights on if you have to get up during the night. · Install sturdy handrails on stairways. · Move items in your cabinets so that the things you use a lot are on the lower shelves (about waist level). · Keep a cordless phone and a flashlight with new batteries by your bed. If possible, put a phone in each of the main rooms of your house, or carry a cell phone in case you fall and cannot reach a phone. Or, you can wear a device around your neck or wrist. You push a button that sends a signal for help. · Wear low-heeled shoes that fit well and give your feet good support. Use footwear with nonskid soles. Check the heels and soles of your shoes for wear. Repair or replace worn heels or soles. · Do not wear socks without shoes on wood floors. · Walk on the grass when the sidewalks are slippery. If you live in an area that gets snow and ice in the winter, sprinkle salt on slippery steps and sidewalks. Preventing falls in the bath  · Install grab bars and nonskid mats inside and outside your shower or tub and near the toilet and sinks. · Use shower chairs and bath benches. · Use a hand-held shower head that will allow you to sit while showering.   · Get into a tub or shower by putting the weaker leg in first. Get out of a tub or shower with your strong side first.  · Repair loose toilet seats and consider installing a raised toilet seat to make getting on and off the toilet easier. · Keep your bathroom door unlocked while you are in the shower. [address] [facility] follow up visit ____________1 week_________________  (Please note your next appointment above and if you are unable to keep, kindly give a 24 hour notice. Thank you.)    If you experience any of the following, please call the [facility] during business hours:    * Increase in Pain  * Temperature over 101  * Increase in drainage from your wound  * Drainage with a foul odor  * Bleeding  * Increase in swelling  * Need for compression bandage changes due to slippage, breakthrough drainage. If you need medical attention outside of the business hours of the [facility] please contact your PCP or go to the nearest emergency room.         Electronically signed by Maik Anders MD on 2/7/2022 at 11:06 AM

## 2022-02-08 ENCOUNTER — OFFICE VISIT (OUTPATIENT)
Dept: PRIMARY CARE CLINIC | Age: 37
End: 2022-02-08
Payer: MEDICAID

## 2022-02-08 VITALS
SYSTOLIC BLOOD PRESSURE: 135 MMHG | HEIGHT: 60 IN | HEART RATE: 105 BPM | BODY MASS INDEX: 14.14 KG/M2 | OXYGEN SATURATION: 98 % | WEIGHT: 72 LBS | TEMPERATURE: 98.5 F | DIASTOLIC BLOOD PRESSURE: 90 MMHG

## 2022-02-08 DIAGNOSIS — I10 ESSENTIAL HYPERTENSION: ICD-10-CM

## 2022-02-08 DIAGNOSIS — Q05.9 SPINA BIFIDA, UNSPECIFIED HYDROCEPHALUS PRESENCE, UNSPECIFIED SPINAL REGION (HCC): Primary | ICD-10-CM

## 2022-02-08 DIAGNOSIS — R39.89 URINE DISCOLORATION: ICD-10-CM

## 2022-02-08 DIAGNOSIS — Z87.828 HISTORY OF OPEN LEG WOUND: ICD-10-CM

## 2022-02-08 LAB
APPEARANCE FLUID: CLEAR
BILIRUBIN, POC: NORMAL
BLOOD URINE, POC: NORMAL
CLARITY, POC: CLEAR
COLOR, POC: YELLOW
GLUCOSE URINE, POC: NORMAL
KETONES, POC: NORMAL
LEUKOCYTE EST, POC: NORMAL
NITRITE, POC: NORMAL
PH, POC: 7
PROTEIN, POC: NORMAL
SPECIFIC GRAVITY, POC: 1.01
UROBILINOGEN, POC: 0.2

## 2022-02-08 PROCEDURE — 99214 OFFICE O/P EST MOD 30 MIN: CPT | Performed by: NURSE PRACTITIONER

## 2022-02-08 PROCEDURE — 81002 URINALYSIS NONAUTO W/O SCOPE: CPT | Performed by: NURSE PRACTITIONER

## 2022-02-08 ASSESSMENT — PATIENT HEALTH QUESTIONNAIRE - PHQ9
2. FEELING DOWN, DEPRESSED OR HOPELESS: 0
1. LITTLE INTEREST OR PLEASURE IN DOING THINGS: 0
SUM OF ALL RESPONSES TO PHQ QUESTIONS 1-9: 0
SUM OF ALL RESPONSES TO PHQ9 QUESTIONS 1 & 2: 0

## 2022-02-08 ASSESSMENT — ENCOUNTER SYMPTOMS
GASTROINTESTINAL NEGATIVE: 1
RESPIRATORY NEGATIVE: 1
EYES NEGATIVE: 1

## 2022-02-08 NOTE — PROGRESS NOTES
[facility] PRIMARY CARE  [address] [id number] [address] [address]  Dept: [phone]  Dept Fax: [phone]  Loc: [phone]    Leticia Germain is a 39 y.o. male who presents today for his medical conditions/complaints as noted below. Leticia Germain is c/o of Urinary Tract Infection (UA taken ) and Hypertension      Chief Complaint   Patient presents with    Urinary Tract Infection     UA taken     Hypertension       HPI:     HPI  Pt is here for chronic conditions follow up including HTN, spina bifida, and wound on knee. He complains of urinary issues and UA was taken. He has noticed that his urine is slightly darker and cloudy intermittently, but it has been some time since it has looked like this. HTN follow up and bp was elevated today. He is seeing wound care of the wound to his knee. He was told this was a pressure ulcer and is needing to wear knee pads to help prevent this from reoccurring. He is allergic to Latex and is having a hard time finding pads that are latex or rubber free. Past Medical History:   Diagnosis Date    Anxiety     Chronic kidney disease     kidney stones started at age 5, treated at [facility].   Has had lithotripsy and surgery to remove stones    Club Foot     Congenital paraplegia ([facility] [address]     Hypertension     diagnosed at age 6    Leg fracture, left     while in body cast due to swelling    Myelomeningocele with hydrocephalus ([facility] [address] 1985    Open spina bifida at birth   [employer] Neuropathy     Pressure sore on ankle     and knee     Scoliosis     Spastic neurogenic bladder     cather x 5 a day      (ventriculoperitoneal) shunt status         Past Surgical History:   Procedure Laterality Date    HERNIA REPAIR      groin    HIP SURGERY Left     LITHOTRIPSY      VENTRICULOPERITONEAL SHUNT      with revisions        Social History     Tobacco Use    Smoking status: Never Smoker    Smokeless tobacco: Never Used   Substance Use Topics    Alcohol use: No        Current Outpatient Medications   Medication Sig Dispense Refill    Catheters MISC CATHETER 6 TIMES DAILY IN & OUT *Q05.9* 180 each 11    lisinopril (PRINIVIL;ZESTRIL) 5 MG tablet TAKE (1) TABLET DAILY FOR BLOOD PRESSURE. 30 tablet 11    Omega-3 Fatty Acids (THE VERY FINEST FISH OIL) LIQD TAKE 3 ML BY MOUTH DAILY 200 mL 5    D-Mannose POWD 1 Units by Does not apply route 2 times daily      busPIRone (BUSPAR) 5 MG tablet TAKE 1 TABLET TWICE DAILY AS NEEDED FOR ANXIETY 60 tablet 11    solifenacin (VESICARE) 5 MG tablet Take 2 tablets by mouth daily (Patient taking differently: Take 5 mg by mouth daily ) 45 tablet 11    Cranberry 200 MG CAPS Take 1 capsule by mouth daily      menthol-zinc oxide (CALMOSEPTINE) 0.44-20.6 % OINT ointment Apply topically daily Max 30 ml per day.  Blood Pressure Monitoring [address] Disabled patient who doesn't drive needs BP monitor for home for parents to check it for him 1 Device 0    aspirin 81 MG chewable tablet Take 81 mg by mouth daily. No current facility-administered medications for this visit. Allergies   Allergen Reactions    Latex Swelling and Rash     Had reaction at dentist office-redness, swelling, and rash  Other reaction(s): rash  Had reaction at dentist office-redness, swelling, and rash    Ceftriaxone Other (See Comments)     Other reaction(s): Other (see comments)  Fever, hallucinations was transported via ambulance [address]   Other reaction(s): high fever  Fever, hallucinations was transported via ambulance [place]     Pravastatin      Myalgias    Vancomycin Other (See Comments)     Other reaction(s):  Other (See Comments)  Red warm hands and itching  Red warm hands and itching       Family History   Problem Relation Age of Onset    Colon Cancer Mother 54        In remission    High Cholesterol Mother     High Blood Pressure Mother     Thyroid Disease Mother         Hypothyroidism    Coronary Art Dis Maternal Grandmother         CABG x 2    Heart Attack Maternal Grandmother     High Blood Pressure Maternal Grandmother     Cancer Maternal Grandmother         Pancreatic Cancer    Heart Failure Maternal Grandfather          in     Immunodeficiency Neg Hx                Subjective:      Review of Systems   Constitutional: Negative. HENT: Negative. Eyes: Negative. Respiratory: Negative. Cardiovascular: Negative. Gastrointestinal: Negative. Endocrine: Negative. Genitourinary:        Dark urine   Musculoskeletal: Negative. Skin: Positive for wound (left knee lesion). Neurological: Negative. Hematological: Negative. Psychiatric/Behavioral: Negative. Objective:     Physical Exam  Constitutional:       Appearance: Normal appearance. HENT:      Head: Normocephalic and atraumatic. Nose: Nose normal.   Eyes:      Extraocular Movements: Extraocular movements intact. Conjunctiva/sclera: Conjunctivae normal.      Pupils: Pupils are equal, round, and reactive to light. Cardiovascular:      Rate and Rhythm: Normal rate and regular rhythm. Pulmonary:      Effort: Pulmonary effort is normal.      Breath sounds: Normal breath sounds. Abdominal:      General: Abdomen is flat. Bowel sounds are normal.   Musculoskeletal:         General: Normal range of motion. Cervical back: Normal range of motion. Thoracic back: Deformity present. Lumbar back: Deformity present. Legs:       Comments: wheelchair   Skin:     General: Skin is warm and dry. Capillary Refill: Capillary refill takes less than 2 seconds. Neurological:      General: No focal deficit present. Mental Status: He is alert and oriented to person, place, and time. Mental status is at baseline. Psychiatric:         Mood and Affect: Mood normal.         Behavior: Behavior normal.         Thought Content:  Thought content normal.         Judgment: Judgment normal.         BP (!) 135/90 (Site: Right Upper Arm)   Pulse 105   Temp 98.5 °F (36.9 °C)   Ht 4' (1.219 m)   Wt 72 lb (32.7 kg)   SpO2 98%   BMI 21.97 kg/m²     Assessment:      Diagnosis Orders   1. Spina bifida, unspecified hydrocephalus presence, unspecified spinal region [facility])  Catheters MISC   2. Urine discoloration  POCT Urinalysis no Micro   3. Essential hypertension     4. History of open leg wound         Results for orders placed or performed in visit on 02/08/22   POCT Urinalysis no Micro   Result Value Ref Range    Color, UA yellow     Clarity, UA clear     Glucose, UA POC -     Bilirubin, UA -     Ketones, UA -     Spec Grav, UA 1.010     Blood, UA POC trace-intact     pH, UA 7.0     Protein, UA POC -     Urobilinogen, UA 0.2     Leukocytes, UA trace     Nitrite, UA -     Appearance, Fluid Clear Clear, Slightly Cloudy       Plan:     UA was negative. Patient is likely dehydrated some and that is causing the darker urine. He is to increase water intake. Keep follow up with wound care. I think patient is having some white coat syndrome and that is the cause of his elevated BP. He is to monitor it at home and call in 1 week to determine if we need to increase lisinopril dose. More than 50% of the time was spent counseling and coordinating care for a total time of 35 mins face to face. Return in about 6 months (around 8/8/2022) for Annual Physical Exam, with labs. Orders Placed This Encounter   Procedures    POCT Urinalysis no Micro       Orders Placed This Encounter   Medications    Catheters MISC     Sig: CATHETER 6 TIMES DAILY IN & OUT *Q05.9*     Dispense:  180 each     Refill:  11            Patient offered educational handouts and has had all questions answered. Patient voices understanding and agrees to plans along with risks and benefits of plan. Patient is instructed to continue prior meds, diet, and exercise plans as instructed.  Patient agrees to follow up as instructed and sooner if needed. Patient agrees to go to ER if condition becomes emergent. EMR Dragon/transcription disclaimer: Some of this encounter note is an electronic transcription/translation of spoken language to printed text. The electronic translation of spoken language may permit erroneous, or at times, nonsensical words or phrases to be inadvertently transcribed.  Although I have reviewed the note for such errors, some may still exist.    Electronically signed by DEVON Curry on 2/8/2022 at 12:37 PM

## 2022-02-10 DIAGNOSIS — I10 ESSENTIAL HYPERTENSION: ICD-10-CM

## 2022-02-10 RX ORDER — LISINOPRIL 10 MG/1
TABLET ORAL
Qty: 30 TABLET | Refills: 5 | Status: SHIPPED | OUTPATIENT
Start: 2022-02-10 | End: 2022-07-19

## 2022-02-14 ENCOUNTER — HOSPITAL ENCOUNTER (OUTPATIENT)
Dept: WOUND CARE | Age: 37
Discharge: HOME | End: 2022-02-14
Payer: MEDICAID

## 2022-02-14 VITALS
BODY MASS INDEX: 14.14 KG/M2 | RESPIRATION RATE: 18 BRPM | DIASTOLIC BLOOD PRESSURE: 84 MMHG | TEMPERATURE: 98.1 F | SYSTOLIC BLOOD PRESSURE: 123 MMHG | HEIGHT: 60 IN | WEIGHT: 72 LBS | HEART RATE: 115 BPM

## 2022-02-14 DIAGNOSIS — L91.0 KELOID: ICD-10-CM

## 2022-02-14 DIAGNOSIS — S81.002D OPEN KNEE WOUND, LEFT, SUBSEQUENT ENCOUNTER: ICD-10-CM

## 2022-02-14 PROCEDURE — 99212 OFFICE O/P EST SF 10 MIN: CPT

## 2022-02-14 PROCEDURE — 99212 OFFICE O/P EST SF 10 MIN: CPT | Performed by: SURGERY

## 2022-02-14 NOTE — PLAN OF CARE
Problem: Wound:  Goal: Will show signs of wound healing; wound closure and no evidence of infection  Description: Will show signs of wound healing; wound closure and no evidence of infection  2/14/2022 1131 by Dyana Babinski, RN  Outcome: Completed  2/14/2022 1131 by Salas Alvarez RN  Outcome: Ongoing

## 2022-02-14 NOTE — PROGRESS NOTES
[address]   Progress Note and Procedure Note      [address] [facility] RECORD NUMBER:  [id number]  AGE: 39 y.o. GENDER: male  : 1985  EPISODE DATE:  2022    Subjective:     Chief Complaint   Patient presents with    Wound Check         HISTORY of PRESENT ILLNESS HPI     Caren Day is a 39 y.o. male who presents today for wound/ulcer evaluation. History of Wound Context: Pt with L knee wound here fort eval/treat  Wound/Ulcer Pain Timing/Severity: none  Quality of pain: N/A  Severity:  0 / 10   Modifying Factors: None  Associated Signs/Symptoms: none    Ulcer Identification:  Ulcer Type: pressure  Contributing Factors: chronic pressure, decreased mobility and shear force    Wound: pressure        PAST MEDICAL HISTORY        Diagnosis Date    Anxiety     Chronic kidney disease     kidney stones started at age 5, treated at [facility].   Has had lithotripsy and surgery to remove stones    Club Foot     Congenital paraplegia ([address]     Hypertension     diagnosed at age 6    Leg fracture, left     while in body cast due to swelling    Myelomeningocele with hydrocephalus ([address] 1985    Open spina bifida at birth   [facility] Neuropathy     Pressure sore on ankle     and knee     Scoliosis     Spastic neurogenic bladder     cather x 5 a day      (ventriculoperitoneal) shunt status        PAST SURGICAL HISTORY    Past Surgical History:   Procedure Laterality Date    HERNIA REPAIR      groin    HIP SURGERY Left     LITHOTRIPSY      VENTRICULOPERITONEAL SHUNT      with revisions        FAMILY HISTORY    Family History   Problem Relation Age of Onset    Colon Cancer Mother 54        In remission    High Cholesterol Mother     High Blood Pressure Mother     Thyroid Disease Mother         Hypothyroidism    Coronary Art Dis Maternal Grandmother         CABG x 2    Heart Attack Maternal Grandmother     High Blood Pressure Maternal Grandmother     Cancer Maternal Grandmother         Pancreatic Cancer    Heart Failure Maternal Grandfather          in     Immunodeficiency Neg Hx        SOCIAL HISTORY    Social History     Tobacco Use    Smoking status: Never Smoker    Smokeless tobacco: Never Used   Vaping Use    Vaping Use: Never used   Substance Use Topics    Alcohol use: No    Drug use: No       ALLERGIES    Allergies   Allergen Reactions    Latex Swelling and Rash     Had reaction at dentist office-redness, swelling, and rash  Other reaction(s): rash  Had reaction at dentist office-redness, swelling, and rash    Ceftriaxone Other (See Comments)     Other reaction(s): Other (see comments)  Fever, hallucinations was transported via ambulance [address]   Other reaction(s): high fever  Fever, hallucinations was transported via ambulance [place]     Pravastatin      Myalgias    Vancomycin Other (See Comments)     Other reaction(s): Other (See Comments)  Red warm hands and itching  Red warm hands and itching       MEDICATIONS    Current Outpatient Medications on File Prior to Encounter   Medication Sig Dispense Refill    lisinopril (PRINIVIL;ZESTRIL) 10 MG tablet TAKE (1) TABLET DAILY FOR BLOOD PRESSURE. 30 tablet 5    Catheters MISC CATHETER 6 TIMES DAILY IN & OUT *Q05.9* 180 each 11    Omega-3 Fatty Acids (THE VERY FINEST FISH OIL) LIQD TAKE 3 ML BY MOUTH DAILY 200 mL 5    D-Mannose POWD 1 Units by Does not apply route 2 times daily      busPIRone (BUSPAR) 5 MG tablet TAKE 1 TABLET TWICE DAILY AS NEEDED FOR ANXIETY 60 tablet 11    solifenacin (VESICARE) 5 MG tablet Take 2 tablets by mouth daily (Patient taking differently: Take 5 mg by mouth daily ) 45 tablet 11    Cranberry 200 MG CAPS Take 1 capsule by mouth daily      menthol-zinc oxide (CALMOSEPTINE) 0.44-20.6 % OINT ointment Apply topically daily Max 30 ml per day.       Blood Pressure Monitoring [address] Disabled patient who doesn't drive needs BP monitor for home for parents to check it for him 1 Device 0    aspirin 81 MG chewable tablet Take 81 mg by mouth daily. No current facility-administered medications on file prior to encounter. REVIEW OF SYSTEMS    A comprehensive review of systems was negative.     Objective:      /84   Pulse 115   Temp 98.1 °F (36.7 °C) (Temporal)   Resp 18   Ht 4' (1.219 m)   Wt 72 lb (32.7 kg)   BMI 21.97 kg/m²     Wt Readings from Last 3 Encounters:   02/14/22 72 lb (32.7 kg)   02/08/22 72 lb (32.7 kg)   02/07/22 72 lb (32.7 kg)       PHYSICAL EXAM    General Appearance: alert and oriented to person, place and time, well developed and well- nourished, in no acute distress  Skin: warm and dry, no rash or erythema  Head: normocephalic and atraumatic  Eyes: pupils equal, round, and reactive to light, extraocular eye movements intact, conjunctivae normal  ENT: tympanic membrane, external ear and ear canal normal bilaterally, nose without deformity, nasal mucosa and turbinates normal without polyps, lips teeth and gums normal  Neck: supple and non-tender without mass, no thyromegaly or thyroid nodules, no cervical lymphadenopathy  Pulmonary/Chest: clear to auscultation bilaterally- no wheezes, rales or rhonchi, normal air movement, no respiratory distress  Cardiovascular: normal rate, regular rhythm, normal S1 and S2, no murmurs, rubs, clicks, or gallops, distal pulses intact, no carotid bruits  Abdomen: soft, non-tender, non-distended, normal bowel sounds, no masses or organomegaly  Extremities: no cyanosis, clubbing or edema  Musculoskeletal: normal range of motion, no joint swelling, deformity or tenderness      Assessment:      Patient Active Problem List   Diagnosis Code    Spina bifida ([address] Q05.9    Paralysis ([address] G83.9    Kidney stones N20.0    Family history of malignant melanoma of skin Z80.8    Catheter-associated urinary tract infection ([address] T83.511A, N39.0    Myelomeningocele with hydrocephalus ([address] Q05.4    Tachycardia R00.0    Congenital flaccid paralysis (HCC) G80.9    Panic attack F41.0    Ulcer of penis N48.5    Leukocytosis D72.829    Anxiety F41.9    E. coli UTI N39.0, B96.20    Bacterial UTI N39.0, A49.9    Essential hypertension I10    Keloid L91.0    Open knee wound, left, subsequent encounter S81.002D             Wound 01/13/22 Knee Anterior; Left wound 1- left knee (Active)   Wound Image   02/14/22 1120   Wound Etiology Pressure Stage  3 02/14/22 1120   Dressing Status Clean;Dry; Intact 02/14/22 1120   Wound Cleansed Soap and water 02/14/22 1120   Dressing/Treatment Hydrofera blue;Moisten with saline;Silicone border 71/17/33 1120   Offloading for Diabetic Foot Ulcers Yes (type) 01/28/22 0821   Wound Length (cm) 0 cm 02/14/22 1120   Wound Width (cm) 0 cm 02/14/22 1120   Wound Depth (cm) 0 cm 02/14/22 1120   Wound Surface Area (cm^2) 0 cm^2 02/14/22 1120   Change in Wound Size % (l*w) 100 02/14/22 1120   Wound Volume (cm^3) 0 cm^3 02/14/22 1120   Wound Healing % 100 02/14/22 1120   Post-Procedure Length (cm) 0.2 cm 02/07/22 1054   Post-Procedure Width (cm) 0.1 cm 02/07/22 1054   Post-Procedure Depth (cm) 0.1 cm 02/07/22 1054   Post-Procedure Surface Area (cm^2) 0.02 cm^2 02/07/22 1054   Post-Procedure Volume (cm^3) 0.002 cm^3 02/07/22 1054   Wound Assessment Epithelialization 02/14/22 1120   Drainage Amount None 02/14/22 1120   Drainage Description Serosanguinous 02/07/22 1033   Odor None 02/07/22 1033   Marsha-wound Assessment Other (Comment); Intact 02/07/22 1033   Margins Defined edges 02/07/22 1033   Wound Thickness Description not for Pressure Injury Full thickness 02/07/22 1033   Number of days: 32             Plan:     Problem List Items Addressed This Visit     * (Principal) Open knee wound, left, subsequent encounter (Chronic)    Keloid        Wound healed Cont knee protector at all times.   RTO prn      Treatment Note please see attached Discharge Instructions    In my professional opinion this patient would benefit from HBO Therapy: No    Written patient dismissal instructions given to patient and signed by patient or POA. Discharge [address] and Hyperbaric Oxygen Therapy   Physician Orders and Discharge Instructions  [address]  Telephone: [phone]    NAME:  Jenna Christiano:  1985  MEDICAL RECORD NUMBER:  [id number]  DATE:  2/14/2022    Discharge condition: Stable    Discharge to: Home    Left via:Private automobile    Accompanied by: Mother     ECF/HHA: [facility]     Dressing Orders:  Left knee wound: Soap and water wash,   Apply hydrafera blue ready silicone border- change every 2-3 days and as needed. A few signs and systems of infection to watch for, increased redness, drainage, foul smell or warm to the touch, could also develop fever, chills, night sweats or any other signs and systems of infection      Treatment Orders: May try knee pads to reduce pressure to the area   Protein rich diet  Multivitamin     WCC follow up visit _____________________________  (Please note your next appointment above and if you are unable to keep, kindly give a 24 hour notice. Thank you.)    If you experience any of the following, please call the [facility] during business hours:    * Increase in Pain  * Temperature over 101  * Increase in drainage from your wound  * Drainage with a foul odor  * Bleeding  * Increase in swelling  * Need for compression bandage changes due to slippage, breakthrough drainage. If you need medical attention outside of the business hours of the [facility] please contact your PCP or go to the nearest emergency room.         Electronically signed by Leo Gutierrez MD on 2/14/2022 at 11:29 AM

## 2022-03-11 ENCOUNTER — OFFICE VISIT (OUTPATIENT)
Dept: PRIMARY CARE CLINIC | Age: 37
End: 2022-03-11
Payer: MEDICAID

## 2022-03-11 VITALS
OXYGEN SATURATION: 98 % | BODY MASS INDEX: 14.14 KG/M2 | HEIGHT: 60 IN | DIASTOLIC BLOOD PRESSURE: 98 MMHG | SYSTOLIC BLOOD PRESSURE: 148 MMHG | HEART RATE: 131 BPM | TEMPERATURE: 96.9 F | WEIGHT: 72 LBS

## 2022-03-11 DIAGNOSIS — R01.1 MURMUR, CARDIAC: Primary | ICD-10-CM

## 2022-03-11 DIAGNOSIS — J01.40 ACUTE NON-RECURRENT PANSINUSITIS: ICD-10-CM

## 2022-03-11 DIAGNOSIS — I10 ESSENTIAL HYPERTENSION: ICD-10-CM

## 2022-03-11 DIAGNOSIS — Q05.9 SPINA BIFIDA, UNSPECIFIED HYDROCEPHALUS PRESENCE, UNSPECIFIED SPINAL REGION (HCC): ICD-10-CM

## 2022-03-11 PROCEDURE — 99214 OFFICE O/P EST MOD 30 MIN: CPT | Performed by: NURSE PRACTITIONER

## 2022-03-11 RX ORDER — DEXTROMETHORPHAN HYDROBROMIDE AND PROMETHAZINE HYDROCHLORIDE 15; 6.25 MG/5ML; MG/5ML
SYRUP ORAL
Qty: 120 ML | Refills: 0 | Status: SHIPPED | OUTPATIENT
Start: 2022-03-11 | End: 2022-03-18

## 2022-03-11 RX ORDER — METHYLPREDNISOLONE 4 MG/1
TABLET ORAL
Qty: 1 KIT | Refills: 0 | Status: SHIPPED | OUTPATIENT
Start: 2022-03-11 | End: 2022-05-24 | Stop reason: ALTCHOICE

## 2022-03-11 RX ORDER — AMOXICILLIN AND CLAVULANATE POTASSIUM 875; 125 MG/1; MG/1
1 TABLET, FILM COATED ORAL 2 TIMES DAILY
Qty: 20 TABLET | Refills: 0 | Status: SHIPPED | OUTPATIENT
Start: 2022-03-11 | End: 2022-03-21

## 2022-03-11 ASSESSMENT — ENCOUNTER SYMPTOMS
EYES NEGATIVE: 1
SINUS PAIN: 1
SINUS PRESSURE: 1
COUGH: 1
GASTROINTESTINAL NEGATIVE: 1

## 2022-03-11 ASSESSMENT — PATIENT HEALTH QUESTIONNAIRE - PHQ9
2. FEELING DOWN, DEPRESSED OR HOPELESS: 0
SUM OF ALL RESPONSES TO PHQ9 QUESTIONS 1 & 2: 0
SUM OF ALL RESPONSES TO PHQ QUESTIONS 1-9: 0
SUM OF ALL RESPONSES TO PHQ QUESTIONS 1-9: 0
1. LITTLE INTEREST OR PLEASURE IN DOING THINGS: 0
SUM OF ALL RESPONSES TO PHQ QUESTIONS 1-9: 0
SUM OF ALL RESPONSES TO PHQ QUESTIONS 1-9: 0

## 2022-03-11 NOTE — PROGRESS NOTES
[address] [facility] CARE  [address] [address] [place] [address]  Dept: [phone]  Dept Fax: [phone]  Loc: [phone]    Chel Palomares is a 39 y.o. male who presents today for his medical conditions/complaints as noted below. Chel Palomares is c/o of Cough (started yesterday), Congestion, and Hypertension      Chief Complaint   Patient presents with    Cough     started yesterday    Congestion    Hypertension       HPI:     HPI   Patient here for follow up on HTN. He was been monitoring BP at home and it has been doing well 118/80 on average. Tuesday patient started having congestion, cough, and low grade fever. He has been taking Sudafed and since starting this medication has been having higher readings. Past Medical History:   Diagnosis Date    Anxiety     Chronic kidney disease     kidney stones started at age 5, treated at [address] [place] [address].   Has had lithotripsy and surgery to remove stones    Club Foot     [address]en[facility]     Hypertension     diagnosed at age 6    Leg fracture, left     while in body cast due to swelling    Myelomeningocele with hydrocephalus ([facility] 1985    Open spina bifida at birth   [facility] Neuropathy     Pressure sore on ankle     and knee     Scoliosis     Spastic neurogenic bladder     cather x 5 a day      (ventriculoperitoneal) shunt status         Past Surgical History:   Procedure Laterality Date    HERNIA REPAIR      groin    HIP SURGERY Left     LITHOTRIPSY      VENTRICULOPERITONEAL SHUNT      with revisions        Social History     Tobacco Use    Smoking status: Never Smoker    Smokeless tobacco: Never Used   Substance Use Topics    Alcohol use: No        Current Outpatient Medications   Medication Sig Dispense Refill    amoxicillin-clavulanate (AUGMENTIN) 875-125 MG per tablet Take 1 tablet by mouth 2 times daily for 10 days 20 tablet 0    methylPREDNISolone (MEDROL DOSEPACK) 4 MG tablet Take by mouth. 1 kit 0    promethazine-dextromethorphan (PROMETHAZINE-DM) 6.25-15 MG/5ML syrup 5 mLs at night as needed for cough 120 mL 0    lisinopril (PRINIVIL;ZESTRIL) 10 MG tablet TAKE (1) TABLET DAILY FOR BLOOD PRESSURE. 30 tablet 5    Catheters MISC CATHETER 6 TIMES DAILY IN & OUT *Q05.9* 180 each 11    Omega-3 Fatty Acids (THE VERY FINEST FISH OIL) LIQD TAKE 3 ML BY MOUTH DAILY 200 mL 5    D-Mannose POWD 1 Units by Does not apply route 2 times daily      busPIRone (BUSPAR) 5 MG tablet TAKE 1 TABLET TWICE DAILY AS NEEDED FOR ANXIETY 60 tablet 11    solifenacin (VESICARE) 5 MG tablet Take 2 tablets by mouth daily (Patient taking differently: Take 5 mg by mouth daily ) 45 tablet 11    Cranberry 200 MG CAPS Take 1 capsule by mouth daily      menthol-zinc oxide (CALMOSEPTINE) 0.44-20.6 % OINT ointment Apply topically daily Max 30 ml per day.  Blood Pressure Monitoring [address] Disabled patient who doesn't drive needs BP monitor for home for parents to check it for him 1 Device 0    aspirin 81 MG chewable tablet Take 81 mg by mouth daily. No current facility-administered medications for this visit. Allergies   Allergen Reactions    Latex Swelling and Rash     Had reaction at dentist office-redness, swelling, and rash  Other reaction(s): rash  Had reaction at dentist office-redness, swelling, and rash    Ceftriaxone Other (See Comments)     Other reaction(s): Other (see comments)  Fever, hallucinations was transported via ambulance [address]   Other reaction(s): high fever  Fever, hallucinations was transported via ambulance [place]     Pravastatin      Myalgias    Vancomycin Other (See Comments)     Other reaction(s):  Other (See Comments)  Red warm hands and itching  Red warm hands and itching       Family History   Problem Relation Age of Onset    Colon Cancer Mother 54        In remission    High Cholesterol Mother     High Blood Pressure Mother     Thyroid Disease Mother Hypothyroidism    Coronary Art Dis Maternal Grandmother         CABG x 2    Heart Attack Maternal Grandmother     High Blood Pressure Maternal Grandmother     Cancer Maternal Grandmother         Pancreatic Cancer    Heart Failure Maternal Grandfather          in     Immunodeficiency Neg Hx                Subjective:      Review of Systems   Constitutional: Positive for fever. HENT: Positive for congestion, ear pain (pressure), sinus pressure and sinus pain. Eyes: Negative. Respiratory: Positive for cough. Cardiovascular: Negative. Gastrointestinal: Negative. Endocrine: Negative. Genitourinary: Negative. Musculoskeletal: Negative. Skin: Negative. Neurological: Negative. Hematological: Negative. Psychiatric/Behavioral: Negative. Objective:     Physical Exam  Constitutional:       Appearance: Normal appearance. HENT:      Head: Normocephalic and atraumatic. Nose:      Right Sinus: Maxillary sinus tenderness and frontal sinus tenderness present. Left Sinus: Maxillary sinus tenderness and frontal sinus tenderness present. Eyes:      Extraocular Movements: Extraocular movements intact. Conjunctiva/sclera: Conjunctivae normal.      Pupils: Pupils are equal, round, and reactive to light. Cardiovascular:      Rate and Rhythm: Normal rate and regular rhythm. Heart sounds: Murmur heard. Systolic murmur is present with a grade of 2/6. Pulmonary:      Effort: Pulmonary effort is normal.      Breath sounds: Normal breath sounds. Abdominal:      General: Abdomen is flat. Bowel sounds are normal.   Musculoskeletal:         General: Normal range of motion. Cervical back: Normal range of motion. Thoracic back: Deformity present. Lumbar back: Deformity present. Legs:       Comments: wheelchair   Skin:     General: Skin is warm and dry. Capillary Refill: Capillary refill takes less than 2 seconds. Neurological:      General: No focal deficit present. Mental Status: He is alert and oriented to person, place, and time. Mental status is at baseline. Psychiatric:         Mood and Affect: Mood normal.         Behavior: Behavior normal.         Thought Content: Thought content normal.         Judgment: Judgment normal.         BP (!) 148/98 (Site: Right Upper Arm)   Pulse 131   Temp 96.9 °F (36.1 °C) (Temporal)   Ht 4' (1.219 m)   Wt 72 lb (32.7 kg)   SpO2 98%   BMI 21.97 kg/m²     Assessment:      Diagnosis Orders   1. Murmur, cardiac  ECHO Complete 2D W Doppler W Color   2. Essential hypertension     3. Spina bifida, unspecified hydrocephalus presence, unspecified spinal region ([address]  methylPREDNISolone (MEDROL DOSEPACK) 4 MG tablet   4. Acute non-recurrent pansinusitis  amoxicillin-clavulanate (AUGMENTIN) 875-125 MG per tablet    methylPREDNISolone (MEDROL DOSEPACK) 4 MG tablet    promethazine-dextromethorphan (PROMETHAZINE-DM) 6.25-15 MG/5ML syrup       No results found for this visit on 03/11/22. Plan:     1. Murmur, cardiac  New cardiac murmur. With significant medical history I am further evaluating this murmur. I believe it may have been caused by the sudafed, but I want to rule out other issues. - ECHO Complete 2D W Doppler W Color; Future    2. Essential hypertension  Elevated today due to Sudafed, but home readings are stable    3. Spina bifida, unspecified hydrocephalus presence, unspecified spinal region ([address]      4. Acute non-recurrent pansinusitis  Treating sinusitis as follows:  - amoxicillin-clavulanate (AUGMENTIN) 875-125 MG per tablet; Take 1 tablet by mouth 2 times daily for 10 days  Dispense: 20 tablet; Refill: 0  - methylPREDNISolone (MEDROL DOSEPACK) 4 MG tablet; Take by mouth. Dispense: 1 kit;  Refill: 0  - promethazine-dextromethorphan (PROMETHAZINE-DM) 6.25-15 MG/5ML syrup; 5 mLs at night as needed for cough  Dispense: 120 mL; Refill: 0       Return for Keep follow up as scheduled. Orders Placed This Encounter   Procedures    ECHO Complete 2D W Doppler W Color     Standing Status:   Future     Standing Expiration Date:   3/11/2023     Order Specific Question:   Reason for exam:     Answer:   new murmur       Orders Placed This Encounter   Medications    amoxicillin-clavulanate (AUGMENTIN) 875-125 MG per tablet     Sig: Take 1 tablet by mouth 2 times daily for 10 days     Dispense:  20 tablet     Refill:  0    methylPREDNISolone (MEDROL DOSEPACK) 4 MG tablet     Sig: Take by mouth. Dispense:  1 kit     Refill:  0    promethazine-dextromethorphan (PROMETHAZINE-DM) 6.25-15 MG/5ML syrup     Si mLs at night as needed for cough     Dispense:  120 mL     Refill:  0            Patient offered educational handouts and has had all questions answered. Patient voices understanding and agrees to plans along with risks and benefits of plan. Patient is instructed to continue prior meds, diet, and exercise plans as instructed. Patient agrees to follow up as instructed and sooner if needed. Patient agrees to go to ER if condition becomes emergent. EMR Dragon/transcription disclaimer: Some of this encounter note is an electronic transcription/translation of spoken language to printed text. The electronic translation of spoken language may permit erroneous, or at times, nonsensical words or phrases to be inadvertently transcribed.  Although I have reviewed the note for such errors, some may still exist.    Electronically signed by DEVON Rice on 3/11/2022 at 12:43 PM

## 2022-03-17 ENCOUNTER — HOSPITAL ENCOUNTER (OUTPATIENT)
Dept: NON INVASIVE DIAGNOSTICS | Age: 37
Discharge: HOME | End: 2022-03-17
Payer: MEDICAID

## 2022-03-17 DIAGNOSIS — R01.1 MURMUR, CARDIAC: ICD-10-CM

## 2022-03-17 DIAGNOSIS — Q05.9 SPINA BIFIDA, UNSPECIFIED HYDROCEPHALUS PRESENCE, UNSPECIFIED SPINAL REGION (HCC): ICD-10-CM

## 2022-03-17 LAB
LV EF: 55 %
LVEF MODALITY: NORMAL

## 2022-03-17 PROCEDURE — 93306 TTE W/DOPPLER COMPLETE: CPT

## 2022-04-07 ENCOUNTER — TELEPHONE (OUTPATIENT)
Dept: PRIMARY CARE CLINIC | Age: 37
End: 2022-04-07

## 2022-04-07 NOTE — TELEPHONE ENCOUNTER
Contact[facility] patient [facility] Provided patient with options of following provider or changing to new PCP in office. Patient chose to stay in office and change provider.  Changed to Dr Reynold Schwartz

## 2022-04-11 ENCOUNTER — TELEPHONE (OUTPATIENT)
Dept: PRIMARY CARE CLINIC | Age: 37
End: 2022-04-11

## 2022-04-11 NOTE — TELEPHONE ENCOUNTER
----- Message from Estefani Delgado sent at 4/11/2022 11:20 AM CDT -----  Subject: Message to Provider    QUESTIONS  Information for Provider? [facility] is calling in regards   to Chucks pads and Pull ups. 2 orders have been sent and there has been no   response. Please call to verify Sawyer Macario  ---------------------------------------------------------------------------  --------------  Melisa BARAJAS  What is the best way for the office to contact you? OK to leave message on   voicemail  Preferred Call Back Phone Number? [phone]  ---------------------------------------------------------------------------  --------------  SCRIPT ANSWERS  Relationship to Patient?  Self

## 2022-05-24 ENCOUNTER — OFFICE VISIT (OUTPATIENT)
Dept: PRIMARY CARE CLINIC | Age: 37
End: 2022-05-24
Payer: MEDICAID

## 2022-05-24 VITALS
TEMPERATURE: 98.1 F | DIASTOLIC BLOOD PRESSURE: 98 MMHG | BODY MASS INDEX: 15.31 KG/M2 | HEIGHT: 60 IN | HEART RATE: 131 BPM | SYSTOLIC BLOOD PRESSURE: 138 MMHG | OXYGEN SATURATION: 98 % | WEIGHT: 78 LBS

## 2022-05-24 DIAGNOSIS — F41.0 GENERALIZED ANXIETY DISORDER WITH PANIC ATTACKS: ICD-10-CM

## 2022-05-24 DIAGNOSIS — N39.0 RECURRENT UTI: ICD-10-CM

## 2022-05-24 DIAGNOSIS — G80.9: ICD-10-CM

## 2022-05-24 DIAGNOSIS — Q05.9 SPINA BIFIDA, UNSPECIFIED HYDROCEPHALUS PRESENCE, UNSPECIFIED SPINAL REGION (HCC): Primary | ICD-10-CM

## 2022-05-24 DIAGNOSIS — F41.1 GENERALIZED ANXIETY DISORDER WITH PANIC ATTACKS: ICD-10-CM

## 2022-05-24 PROBLEM — B96.20 E. COLI UTI: Status: RESOLVED | Noted: 2019-01-01 | Resolved: 2022-05-24

## 2022-05-24 PROCEDURE — 99214 OFFICE O/P EST MOD 30 MIN: CPT | Performed by: FAMILY MEDICINE

## 2022-05-24 SDOH — ECONOMIC STABILITY: FOOD INSECURITY: WITHIN THE PAST 12 MONTHS, YOU WORRIED THAT YOUR FOOD WOULD RUN OUT BEFORE YOU GOT MONEY TO BUY MORE.: NEVER TRUE

## 2022-05-24 SDOH — ECONOMIC STABILITY: FOOD INSECURITY: WITHIN THE PAST 12 MONTHS, THE FOOD YOU BOUGHT JUST DIDN'T LAST AND YOU DIDN'T HAVE MONEY TO GET MORE.: NEVER TRUE

## 2022-05-24 ASSESSMENT — SOCIAL DETERMINANTS OF HEALTH (SDOH): HOW HARD IS IT FOR YOU TO PAY FOR THE VERY BASICS LIKE FOOD, HOUSING, MEDICAL CARE, AND HEATING?: NOT HARD AT ALL

## 2022-05-24 ASSESSMENT — ENCOUNTER SYMPTOMS
GASTROINTESTINAL NEGATIVE: 1
EYES NEGATIVE: 1
RESPIRATORY NEGATIVE: 1

## 2022-05-24 NOTE — PROGRESS NOTES
[facility] PRIMARY CARE  [address] [address] [phone] [place] [address]  Dept: [phone]  Dept Fax: [phone]  Loc: [phone]      Subjective:     Chief Complaint   Patient presents with    Follow-up       HPI:  Concepcion Monk is a 39 y.o. male presents today for his routine follow-up and face to face evaluation for a new wheelchair. Pt has spina bifida and has had been using his current wheelchair since 2015. He got his wheelchair [facility] His orthopedic doctor from [place] ([address]  [facility]) recommended that it is time for him to get a new wheelchair. The seat cushion is worn out putting him at risk for pressure ulcers. He uses a manual comfort curve wheelchair. He will also need a new order for [place] 10 self catheter. He self catheterize  6 times a day and he will need 6 boxes (30 catheters in a box) . This will be good for a month. His parents are here with the patient and requests to get both of this thru [facility] (based off [address], [address] Fax # [phone]) or anywhere closer locally. He needs these supplies by mid June because he will be on Medicare by July. .  Chronic meds reviewed. Pt states that he only uses Buspar as needed. He still has panic episodes every now and then. Overall, he states that he is fairing well. ROS:   Review of Systems   Constitutional: Negative. Activity change:  no change, pt is wheelchair bound. HENT: Negative. Eyes: Negative. Respiratory: Negative. Cardiovascular: Negative. Gastrointestinal: Negative. Endocrine: Negative. Genitourinary: Negative. Musculoskeletal: Negative. Neurological: Negative. Hematological: Negative. Psychiatric/Behavioral: The patient is nervous/anxious. PMHx:  Past Medical History:   Diagnosis Date    Anxiety     Chronic kidney disease     kidney stones started at age 5, treated at [facility].   Has had lithotripsy and surgery to remove stones    Club Foot     Congenital paraplegia ([address]     Hypertension     diagnosed at age 6    Leg fracture, left     while in body cast due to swelling    Myelomeningocele with hydrocephalus ([address] 1985    Open spina bifida at birth   [facility] Neuropathy     Open knee wound, left, subsequent encounter 2022    Pressure sore on ankle     and knee     Scoliosis     Spastic neurogenic bladder     cather x 5 a day      (ventriculoperitoneal) shunt status      Patient Active Problem List   Diagnosis    Spina bifida ([address]    Kidney stones    Family history of malignant melanoma of skin    Catheter-associated urinary tract infection ([address]    Myelomeningocele with hydrocephalus (HCC)    Tachycardia    Congenital flaccid paralysis (HCC)    Panic attack    Ulcer of penis    Leukocytosis    Anxiety    Essential hypertension    Keloid    Open knee wound, left, subsequent encounter       PSHx:  Past Surgical History:   Procedure Laterality Date    HERNIA REPAIR      groin    HIP SURGERY Left     LITHOTRIPSY      VENTRICULOPERITONEAL SHUNT      with revisions        PFHx:  Family History   Problem Relation Age of Onset    Colon Cancer Mother 54        In remission    High Cholesterol Mother     High Blood Pressure Mother     Thyroid Disease Mother         Hypothyroidism    Coronary Art Dis Maternal Grandmother         CABG x 2    Heart Attack Maternal Grandmother     High Blood Pressure Maternal Grandmother     Cancer Maternal Grandmother         Pancreatic Cancer    Heart Failure Maternal Grandfather          in     Immunodeficiency Neg Hx        SocialHx:  Social History     Tobacco Use    Smoking status: Never Smoker    Smokeless tobacco: Never Used   Substance Use Topics    Alcohol use: No       Allergies:   Allergies   Allergen Reactions    Latex Swelling and Rash     Had reaction at dentist office-redness, swelling, and rash  Other reaction(s): rash  Had reaction at dentist office-redness, swelling, and rash    Ceftriaxone Other (See Comments)     Other reaction(s): Other (see comments)  Fever, hallucinations was transported via ambulance [address]   Other reaction(s): high fever  Fever, hallucinations was transported via ambulance [place]     Pravastatin      Myalgias    Vancomycin Other (See Comments)     Other reaction(s): Other (See Comments)  Red warm hands and itching  Red warm hands and itching       Medications:  Current Outpatient Medications   Medication Sig Dispense Refill    Catheters MISC CATHETER 6 TIMES DAILY IN & OUT *Q05.9* 180 each 11    lisinopril (PRINIVIL;ZESTRIL) 10 MG tablet TAKE (1) TABLET DAILY FOR BLOOD PRESSURE. 30 tablet 5    Omega-3 Fatty Acids (THE VERY FINEST FISH OIL) LIQD TAKE 3 ML BY MOUTH DAILY 200 mL 5    D-Mannose POWD 1 Units by Does not apply route 2 times daily      busPIRone (BUSPAR) 5 MG tablet TAKE 1 TABLET TWICE DAILY AS NEEDED FOR ANXIETY 60 tablet 11    solifenacin (VESICARE) 5 MG tablet Take 2 tablets by mouth daily (Patient taking differently: Take 5 mg by mouth daily ) 45 tablet 11    Cranberry 200 MG CAPS Take 1 capsule by mouth daily      menthol-zinc oxide (CALMOSEPTINE) 0.44-20.6 % OINT ointment Apply topically daily Max 30 ml per day.  aspirin 81 MG chewable tablet Take 81 mg by mouth daily. No current facility-administered medications for this visit. Objective:   PE:  BP (!) 138/98   Pulse 131   Temp 98.1 °F (36.7 °C)   Ht 4' (1.219 m)   Wt 78 lb (35.4 kg)   SpO2 98%   BMI 23.80 kg/m²   Physical Exam  Vitals and nursing note reviewed. Exam conducted with a chaperone present (both parents). Constitutional:       General: He is not in acute distress. Appearance: Normal appearance. He is not ill-appearing. HENT:      Head: Normocephalic.       Right Ear: External ear normal.      Left Ear: External ear normal.      Mouth/Throat:      Mouth: Mucous membranes are moist.   Eyes:      Extraocular Movements: Extraocular movements intact. Conjunctiva/sclera: Conjunctivae normal.      Pupils: Pupils are equal, round, and reactive to light. Cardiovascular:      Rate and Rhythm: Normal rate and regular rhythm. Heart sounds: Murmur heard. Systolic murmur is present with a grade of 2/6. Pulmonary:      Effort: Pulmonary effort is normal.      Breath sounds: Normal breath sounds. Abdominal:      General: Abdomen is flat. Bowel sounds are normal.   Musculoskeletal:         General: Normal range of motion. Cervical back: Normal range of motion. Thoracic back: Deformity present. Lumbar back: Deformity present. Legs:    Skin:     General: Skin is warm and dry. Capillary Refill: Capillary refill takes less than 2 seconds. Neurological:      General: No focal deficit present. Mental Status: He is alert and oriented to person, place, and time. Mental status is at baseline. Psychiatric:         Attention and Perception: Attention normal.         Mood and Affect: Mood normal.         Behavior: Behavior normal.         Thought Content: Thought content normal.         Judgment: Judgment normal.            Assessment & Plan   Angelina Bernstein was seen today for follow-up. Diagnoses and all orders for this visit:    Spina bifida, unspecified hydrocephalus presence, unspecified spinal region [facility])  -     DME Order for (Specify) as OP    Generalized anxiety disorder with panic attacks    Recurrent UTI    Congenital flaccid paralysis ([facility]        Return in about 11 weeks (around 8/9/2022) for routine follow-up, chronic care management. All questions were answered. Medications, including possible adverse effects, and instructions were reviewed and  understanding was confirmed. Follow-up recommendations, including when to contact or return to office (ie; if symptoms worsen or fail to improve), were discussed and acknowledged.     Electronically signed by Prema Mon MD on 5/24/22 at 9:16 AM CDT

## 2022-05-31 ENCOUNTER — OFFICE VISIT (OUTPATIENT)
Dept: PRIMARY CARE CLINIC | Age: 37
End: 2022-05-31
Payer: MEDICAID

## 2022-05-31 VITALS
HEIGHT: 60 IN | TEMPERATURE: 98.9 F | WEIGHT: 71 LBS | DIASTOLIC BLOOD PRESSURE: 82 MMHG | SYSTOLIC BLOOD PRESSURE: 130 MMHG | HEART RATE: 116 BPM | BODY MASS INDEX: 13.94 KG/M2 | OXYGEN SATURATION: 98 %

## 2022-05-31 DIAGNOSIS — N20.0 KIDNEY STONES: ICD-10-CM

## 2022-05-31 DIAGNOSIS — Q05.9 SPINA BIFIDA, UNSPECIFIED HYDROCEPHALUS PRESENCE, UNSPECIFIED SPINAL REGION (HCC): Primary | ICD-10-CM

## 2022-05-31 DIAGNOSIS — R30.0 DYSURIA: ICD-10-CM

## 2022-05-31 DIAGNOSIS — R30.0 DYSURIA: Primary | ICD-10-CM

## 2022-05-31 LAB
APPEARANCE FLUID: ABNORMAL
BILIRUBIN, POC: ABNORMAL
BLOOD URINE, POC: ABNORMAL
CLARITY, POC: ABNORMAL
COLOR, POC: CLEAR
GLUCOSE URINE, POC: ABNORMAL
KETONES, POC: ABNORMAL
LEUKOCYTE EST, POC: ABNORMAL
NITRITE, POC: ABNORMAL
PH, POC: 7.5
PROTEIN, POC: ABNORMAL
SPECIFIC GRAVITY, POC: 1.01
UROBILINOGEN, POC: ABNORMAL

## 2022-05-31 PROCEDURE — 99213 OFFICE O/P EST LOW 20 MIN: CPT | Performed by: NURSE PRACTITIONER

## 2022-05-31 PROCEDURE — 81002 URINALYSIS NONAUTO W/O SCOPE: CPT | Performed by: NURSE PRACTITIONER

## 2022-05-31 RX ORDER — SULFAMETHOXAZOLE AND TRIMETHOPRIM 800; 160 MG/1; MG/1
1 TABLET ORAL 2 TIMES DAILY
Qty: 14 TABLET | Refills: 0 | Status: SHIPPED | OUTPATIENT
Start: 2022-05-31 | End: 2022-06-07

## 2022-05-31 ASSESSMENT — ENCOUNTER SYMPTOMS
PHOTOPHOBIA: 0
NAUSEA: 0
VOICE CHANGE: 0
SHORTNESS OF BREATH: 0
VOMITING: 0
RHINORRHEA: 0
COLOR CHANGE: 0
COUGH: 0
BACK PAIN: 0

## 2022-05-31 NOTE — PROGRESS NOTES
[address] PRIMARY CARE  [address] [address] [place] [address]  Dept: [phone]  Dept Fax: [phone]  Loc: [phone]    Cher Rivera is a 39 y.o. male who presents today for his medical conditions/complaints as noted below. Cher Rivera is c/o of Dysuria        HPI:     HPI   Chief Complaint   Patient presents with    Dysuria     Patient presents today for evaluation of dysuria/cloudy/malodorous urine. He reports symptoms present x 2 days. He self caths due to paraplegia. He denies fever. Past Medical History:   Diagnosis Date    Anxiety     Chronic kidney disease     kidney stones started at age 5, treated at [facility].   Has had lithotripsy and surgery to remove stones    Club Foot     [address] [address]     Hypertension     diagnosed at age 6    Leg fracture, left     while in body cast due to swelling    Myelomeningocele with hydrocephalus ([facility] 1985    Open spina bifida at birth   [facility] Neuropathy     Open knee wound, left, subsequent encounter 1/13/2022    Pressure sore on ankle     and knee     Scoliosis     Spastic neurogenic bladder     cather x 5 a day      (ventriculoperitoneal) shunt status       Past Surgical History:   Procedure Laterality Date    HERNIA REPAIR      groin    HIP SURGERY Left     LITHOTRIPSY      VENTRICULOPERITONEAL SHUNT      with revisions        Vitals 5/31/2022 5/24/2022 3/11/2022 3/11/2022 2/14/2022 4/6/3394   SYSTOLIC 660 603 [phone]   DIASTOLIC 82 98 98 98 84 90   Site Left Upper Arm - Right Upper Arm Left Upper Arm - Right Upper Arm   Position Sitting - - - - -   Pulse 116 131 - 131 115 105   Temp 98.9 98.1 - 96.9 98.1 98.5   Resp - - - - 18 -   SpO2 98 98 - 98 - 98   Weight 71 lb 78 lb - 72 lb 72 lb 72 lb   Height 4' 0\" 4' 0\" - 4' 0\" 4' 0\" 4' 0\"   Body mass index 21.66 kg/m2 23.8 kg/m2 - 21.97 kg/m2 21.97 kg/m2 21.97 kg/m2   Pain Level - - - - - -   Some recent data might be hidden       Family History   Problem Relation Age of Onset    Colon Cancer Mother 54        In remission    High Cholesterol Mother     High Blood Pressure Mother     Thyroid Disease Mother         Hypothyroidism    Coronary Art Dis Maternal Grandmother         CABG x 2    Heart Attack Maternal Grandmother     High Blood Pressure Maternal Grandmother     Cancer Maternal Grandmother         Pancreatic Cancer    Heart Failure Maternal Grandfather          in     Immunodeficiency Neg Hx        Social History     Tobacco Use    Smoking status: Never Smoker    Smokeless tobacco: Never Used   Substance Use Topics    Alcohol use: No      Current Outpatient Medications on File Prior to Visit   Medication Sig Dispense Refill    Catheters MISC CATHETER 6 TIMES DAILY IN & OUT *Q05.9* 180 each 11    lisinopril (PRINIVIL;ZESTRIL) 10 MG tablet TAKE (1) TABLET DAILY FOR BLOOD PRESSURE. 30 tablet 5    Omega-3 Fatty Acids (THE VERY FINEST FISH OIL) LIQD TAKE 3 ML BY MOUTH DAILY 200 mL 5    D-Mannose POWD 1 Units by Does not apply route 2 times daily      busPIRone (BUSPAR) 5 MG tablet TAKE 1 TABLET TWICE DAILY AS NEEDED FOR ANXIETY 60 tablet 11    solifenacin (VESICARE) 5 MG tablet Take 2 tablets by mouth daily (Patient taking differently: Take 5 mg by mouth daily ) 45 tablet 11    Cranberry 200 MG CAPS Take 1 capsule by mouth daily      menthol-zinc oxide (CALMOSEPTINE) 0.44-20.6 % OINT ointment Apply topically daily Max 30 ml per day.  aspirin 81 MG chewable tablet Take 81 mg by mouth daily. No current facility-administered medications on file prior to visit. Allergies   Allergen Reactions    Latex Swelling and Rash     Had reaction at dentist office-redness, swelling, and rash  Other reaction(s): rash  Had reaction at dentist office-redness, swelling, and rash    Ceftriaxone Other (See Comments)     Other reaction(s):  Other (see comments)  Fever, hallucinations was transported via ambulance [address]   Other reaction(s): high fever  Fever, hallucinations was transported via ambulance [place]     Pravastatin      Myalgias    Vancomycin Other (See Comments)     Other reaction(s): Other (See Comments)  Red warm hands and itching  Red warm hands and itching       Health Maintenance   Topic Date Due    Varicella vaccine (1 of 2 - 2-dose childhood series) Never done    Hepatitis C screen  Never done    COVID-19 Vaccine (3 - Booster for Moderna series) 10/19/2022    DTaP/Tdap/Td vaccine (2 - Td or Tdap) 11/19/2022    Depression Screen  03/11/2023    Flu vaccine  Completed    HIV screen  Addressed    Hepatitis A vaccine  Aged Out    Hepatitis B vaccine  Aged Out    Hib vaccine  Aged Out    Meningococcal (ACWY) vaccine  Aged Out    Pneumococcal 0-64 years Vaccine  Aged Out       Subjective:      Review of Systems   Constitutional: Negative for chills and fever. HENT: Negative for ear pain, hearing loss, rhinorrhea and voice change. Eyes: Negative for photophobia and visual disturbance. Respiratory: Negative for cough and shortness of breath. Cardiovascular: Negative for chest pain and palpitations. Gastrointestinal: Negative for nausea and vomiting. Endocrine: Negative. Negative for cold intolerance and heat intolerance. Genitourinary: Positive for dysuria. Negative for difficulty urinating and flank pain. Musculoskeletal: Negative for back pain and neck pain. Skin: Negative for color change and rash. Allergic/Immunologic: Negative for environmental allergies and food allergies. Neurological: Negative for dizziness, speech difficulty and headaches. Hematological: Does not bruise/bleed easily. Psychiatric/Behavioral: Negative for sleep disturbance and suicidal ideas. Objective:     Physical Exam  Vitals and nursing note reviewed. Constitutional:       Appearance: He is well-developed. HENT:      Head: Atraumatic.       Right Ear: External ear normal.      Left Ear: External ear normal.      Nose: Nose normal.   Eyes:      Conjunctiva/sclera: Conjunctivae normal.      Pupils: Pupils are equal, round, and reactive to light. Cardiovascular:      Rate and Rhythm: Normal rate and regular rhythm. Heart sounds: Normal heart sounds, S1 normal and S2 normal.   Pulmonary:      Effort: Pulmonary effort is normal.      Breath sounds: Normal breath sounds. Abdominal:      General: Bowel sounds are normal.      Palpations: Abdomen is soft. Musculoskeletal:         General: Normal range of motion. Cervical back: Normal range of motion and neck supple. Skin:     General: Skin is warm and dry. Neurological:      Mental Status: He is alert and oriented to person, place, and time. Psychiatric:         Behavior: Behavior normal.       /82 (Site: Left Upper Arm, Position: Sitting)   Pulse (!) 116   Temp 98.9 °F (37.2 °C) (Temporal)   Ht 4' (1.219 m)   Wt 71 lb (32.2 kg)   SpO2 98%   BMI 21.67 kg/m²     Assessment:       Diagnosis Orders   1. Dysuria  POCT Urinalysis no Micro   2. Kidney stones  POCT Urinalysis no Micro     Results for orders placed or performed in visit on 05/31/22   POCT Urinalysis no Micro   Result Value Ref Range    Color, UA clear     Clarity, UA cloudy     Glucose, UA POC neg     Bilirubin, UA neg     Ketones, UA neg     Spec Grav, UA 1.015     Blood, UA POC trace-lysed     pH, UA 7.5     Protein, UA POC neg     Urobilinogen, UA 0.2 E.U./dL     Leukocytes, UA large     Nitrite, UA neg     Appearance, Fluid Cloudy (A) Clear, Slightly Cloudy         Plan:     Begin bactrim-ds as directed. Will send off for urine culture. Follow-up if not improving. PDMP Monitoring:    Last PDMP [place] as Reviewed [facility]):  Review User Review Instant Review Result            Urine Drug Screenings (1 yr)    No resulted procedures found.        Medication Contract and Consent for Opioid Use Documents Filed     Patient Documents Type of Document Status Date Received Received By Description    Medication Contract Signed 10/14/2013  2:44 PM Dayanna Carneyers                  Patient given educational materials -see patient instructions. Discussed use, benefit, and side effects of prescribed medications. All patient questions answered. Pt voiced understanding. Reviewed health maintenance. Instructed to continue currentmedications, diet and exercise. Patient agreed with treatment plan. Follow up as directed. MEDICATIONS:  No orders of the defined types were placed in this encounter. ORDERS:  Orders Placed This Encounter   Procedures    POCT Urinalysis no Micro       Follow-up:  No follow-ups on file. PATIENT INSTRUCTIONS:  There are no Patient Instructions on file for this visit. Electronically signed by DEVON Edwards on 5/31/2022 at 12:23 PM    EMR Dragon/transcription disclaimer:  Much of thisencounter note is electronic transcription/translation of spoken language to printed texts. The electronic translation of spoken language may be erroneous, or at times, nonsensical words or phrases may be inadvertentlytranscribed.   Although I have reviewed the note for such errors, some may still exist.

## 2022-06-02 LAB
ORGANISM: ABNORMAL
URINE CULTURE, ROUTINE: ABNORMAL
URINE CULTURE, ROUTINE: ABNORMAL

## 2022-07-12 ENCOUNTER — OFFICE VISIT (OUTPATIENT)
Dept: PRIMARY CARE CLINIC | Age: 37
End: 2022-07-12
Payer: MEDICARE

## 2022-07-12 VITALS
BODY MASS INDEX: 14.14 KG/M2 | DIASTOLIC BLOOD PRESSURE: 84 MMHG | HEIGHT: 60 IN | HEART RATE: 103 BPM | SYSTOLIC BLOOD PRESSURE: 142 MMHG | OXYGEN SATURATION: 96 % | WEIGHT: 72 LBS | TEMPERATURE: 98.1 F

## 2022-07-12 DIAGNOSIS — G80.9: ICD-10-CM

## 2022-07-12 DIAGNOSIS — Q05.9 SPINA BIFIDA, UNSPECIFIED HYDROCEPHALUS PRESENCE, UNSPECIFIED SPINAL REGION (HCC): ICD-10-CM

## 2022-07-12 DIAGNOSIS — F41.9 ANXIETY: Primary | ICD-10-CM

## 2022-07-12 DIAGNOSIS — I10 ESSENTIAL HYPERTENSION: ICD-10-CM

## 2022-07-12 PROBLEM — D72.829 LEUKOCYTOSIS: Status: RESOLVED | Noted: 2018-12-30 | Resolved: 2022-07-12

## 2022-07-12 PROCEDURE — 99214 OFFICE O/P EST MOD 30 MIN: CPT | Performed by: FAMILY MEDICINE

## 2022-07-12 PROCEDURE — 1036F TOBACCO NON-USER: CPT | Performed by: FAMILY MEDICINE

## 2022-07-12 PROCEDURE — G8427 DOCREV CUR MEDS BY ELIG CLIN: HCPCS | Performed by: FAMILY MEDICINE

## 2022-07-12 PROCEDURE — G8420 CALC BMI NORM PARAMETERS: HCPCS | Performed by: FAMILY MEDICINE

## 2022-07-12 RX ORDER — BUSPIRONE HYDROCHLORIDE 5 MG/1
TABLET ORAL
Qty: 90 TABLET | Refills: 11 | Status: SHIPPED | OUTPATIENT
Start: 2022-07-12

## 2022-07-12 NOTE — PROGRESS NOTES
[address] [facility]  [address] [address] [address]  Dept: [phone]  Dept Fax: [phone]  Loc: [phone]      Subjective:     Chief Complaint   Patient presents with    Other     recert for catheter    Anxiety     has improved since last visit but is still not under control        HPI:  Beni Beasley is a 39 y.o. male presents today for routine visit and re certification for daily catheter use. He needs 200 catherers a month (6 a day, 42-50 a week). He will need this for the rest of his life and refills will need to be done this time every year. He is also here for follow-up on his NIDIA. He states that his anxiety is improving some. He states that the anxiety is mostly worse towards the end of the day. He states that he is sleeping better    ROS:   Review of Systems   Constitutional: Negative. Activity change:  no change, pt is wheelchair bound. HENT: Negative. Eyes: Negative. Respiratory: Negative. Cardiovascular: Negative. Gastrointestinal: Negative. Endocrine: Negative. Genitourinary: Negative. Musculoskeletal: Negative. Neurological: Negative. Hematological: Negative. Psychiatric/Behavioral:  The patient is nervous/anxious. PMHx:  Past Medical History:   Diagnosis Date    Anxiety     Chronic kidney disease     kidney stones started at age 5, treated at [facility].   Has had lithotripsy and surgery to remove stones    Club Foot     Congenital paraplegia ([facility] [address]     Hypertension     diagnosed at age 6    Leg fracture, left     while in body cast due to swelling    Myelomeningocele with hydrocephalus ([facility] [address] 1985    Open spina bifida at birth    Neuropathy     Open knee wound, left, subsequent encounter 1/13/2022    Pressure sore on ankle     and knee     Scoliosis     Spastic neurogenic bladder     cather x 5 a day     Ulcer of penis 10/31/2016     (ventriculoperitoneal) shunt status      Patient Active Problem List   Diagnosis    Spina bifida ([facility] [address]    Kidney stones    Family history of malignant melanoma of skin    Catheter-associated urinary tract infection ([facility] [address]    Myelomeningocele with hydrocephalus ([facility] [address]    Congenital flaccid paralysis ([facility])    Panic attack    Anxiety    Essential hypertension    Keloid    Open knee wound, left, subsequent encounter       PSHx:  Past Surgical History:   Procedure Laterality Date    HERNIA REPAIR      groin    HIP SURGERY Left     LITHOTRIPSY      VENTRICULOPERITONEAL SHUNT      with revisions        PFHx:  Family History   Problem Relation Age of Onset    Colon Cancer Mother 54        In remission    High Cholesterol Mother     High Blood Pressure Mother     Thyroid Disease Mother         Hypothyroidism    Coronary Art Dis Maternal Grandmother         CABG x 2    Heart Attack Maternal Grandmother     High Blood Pressure Maternal Grandmother     Cancer Maternal Grandmother         Pancreatic Cancer    Heart Failure Maternal Grandfather          in     Immunodeficiency Neg Hx        SocialHx:  Social History     Tobacco Use    Smoking status: Never    Smokeless tobacco: Never   Substance Use Topics    Alcohol use: No       Allergies: Allergies   Allergen Reactions    Latex Swelling and Rash     Had reaction at dentist office-redness, swelling, and rash  Other reaction(s): rash  Had reaction at dentist office-redness, swelling, and rash    Ceftriaxone Other (See Comments)     Other reaction(s): Other (see comments)  Fever, hallucinations was transported via ambulance [address]   Other reaction(s): high fever  Fever, hallucinations was transported via ambulance [place]     Pravastatin      Myalgias    Vancomycin Other (See Comments)     Other reaction(s):  Other (See Comments)  Red warm hands and itching  Red warm hands and itching       Medications:  Current Outpatient Medications   Medication Sig Dispense Refill    busPIRone (BUSPAR) 5 MG tablet TAKE 1 TABLET three times a day  AS NEEDED FOR ANXIETY 90 tablet 11    Catheters (SELF-CATH PLUS COUDE TIP) MISC Use as needed  ([address] [place] 16 inch)- self catheterize up to 6 times a day. Needs 200 a month    Dx:Spina Bifida 200 each 5    Catheters MISC CATHETER 6 TIMES DAILY IN & OUT *Q05.9* 180 each 11    lisinopril (PRINIVIL;ZESTRIL) 10 MG tablet TAKE (1) TABLET DAILY FOR BLOOD PRESSURE. 30 tablet 5    Omega-3 Fatty Acids (THE VERY FINEST FISH OIL) LIQD TAKE 3 ML BY MOUTH DAILY 200 mL 5    D-Mannose POWD 1 Units by Does not apply route 2 times daily      solifenacin (VESICARE) 5 MG tablet Take 2 tablets by mouth daily (Patient taking differently: Take 5 mg by mouth daily ) 45 tablet 11    Cranberry 200 MG CAPS Take 1 capsule by mouth daily      menthol-zinc oxide (CALMOSEPTINE) 0.44-20.6 % OINT ointment Apply topically daily Max 30 ml per day. aspirin 81 MG chewable tablet Take 81 mg by mouth daily. No current facility-administered medications for this visit. Objective:   PE:  BP (!) 142/84   Pulse (!) 103   Temp 98.1 °F (36.7 °C)   Ht 4' (1.219 m)   Wt 72 lb (32.7 kg)   SpO2 96%   BMI 21.97 kg/m²   Physical Exam  Vitals and nursing note reviewed. Exam conducted with a chaperone present (both parents). Constitutional:       General: He is not in acute distress. Appearance: Normal appearance. He is not ill-appearing. HENT:      Head: Normocephalic. Right Ear: External ear normal.      Left Ear: External ear normal.      Mouth/Throat:      Mouth: Mucous membranes are moist.   Eyes:      Extraocular Movements: Extraocular movements intact. Conjunctiva/sclera: Conjunctivae normal.      Pupils: Pupils are equal, round, and reactive to light. Cardiovascular:      Rate and Rhythm: Normal rate and regular rhythm. Heart sounds: Murmur heard. Systolic murmur is present with a grade of 2/6. Pulmonary:      Effort: Pulmonary effort is normal.      Breath sounds: Normal breath sounds. Abdominal:      General: Abdomen is flat. Bowel sounds are normal.   Musculoskeletal:         General: Normal range of motion. Cervical back: Normal range of motion. Thoracic back: Deformity present. Lumbar back: Deformity present. Legs:    Skin:     General: Skin is warm and dry. Capillary Refill: Capillary refill takes less than 2 seconds. Neurological:      General: No focal deficit present. Mental Status: He is alert and oriented to person, place, and time. Mental status is at baseline. Psychiatric:         Attention and Perception: Attention normal.         Mood and Affect: Mood normal.         Behavior: Behavior normal.         Thought Content: Thought content normal.         Judgment: Judgment normal.          Assessment & Plan   Phoenix Paul was seen today for other and anxiety. Diagnoses and all orders for this visit:    Anxiety  -     busPIRone (BUSPAR) 5 MG tablet; TAKE 1 TABLET three times a day  AS NEEDED FOR ANXIETY    Essential hypertension    Spina bifida, unspecified hydrocephalus presence, unspecified spinal region ([facility] [address]  -     Catheters (SELF-CATH PLUS COUDE TIP) MISC; Use as needed  (10 [place] 16 inch)- self catheterize up to 6 times a day. Needs 200 a month    Dx:Spina Bifida    Congenital flaccid paralysis ([address]      Return in 4 weeks (on 8/9/2022) for routine follow-up. All questions were answered. Medications, including possible adverse effects, and instructions were reviewed and  understanding was confirmed. Follow-up recommendations, including when to contact or return to office (ie; if symptoms worsen or fail to improve), were discussed and acknowledged.     Electronically signed by Bulmaro Yeung MD on 7/12/22 at 1:03 PM CDT

## 2022-07-15 ASSESSMENT — ENCOUNTER SYMPTOMS
GASTROINTESTINAL NEGATIVE: 1
RESPIRATORY NEGATIVE: 1
EYES NEGATIVE: 1

## 2022-07-19 DIAGNOSIS — I10 ESSENTIAL HYPERTENSION: ICD-10-CM

## 2022-07-19 RX ORDER — LISINOPRIL 10 MG/1
TABLET ORAL
Qty: 30 TABLET | Refills: 0 | Status: SHIPPED | OUTPATIENT
Start: 2022-07-19 | End: 2022-08-09 | Stop reason: SDUPTHER

## 2022-07-19 NOTE — TELEPHONE ENCOUNTER
Edna Tom called requesting a refill of the below medication which has been pended for you:     Requested Prescriptions     Pending Prescriptions Disp Refills    lisinopril (PRINIVIL;ZESTRIL) 10 MG tablet [Pharmacy Med Name: LISINOPRIL 10MG TABLET] 30 tablet 5     Sig: TAKE 1 TABLET BY MOUTH DAILY FOR BLOOD PRESSURE       Last Appointment Date: 7/12/2022  Next Appointment Date: 8/9/2022    Allergies   Allergen Reactions    Latex Swelling and Rash     Had reaction at dentist office-redness, swelling, and rash  Other reaction(s): rash  Had reaction at dentist office-redness, swelling, and rash    Ceftriaxone Other (See Comments)     Other reaction(s): Other (see comments)  Fever, hallucinations was transported via ambulance [address]   Other reaction(s): high fever  Fever, hallucinations was transported via ambulance [place]     Pravastatin      Myalgias    Vancomycin Other (See Comments)     Other reaction(s):  Other (See Comments)  Red warm hands and itching  Red warm hands and itching

## 2022-08-05 DIAGNOSIS — F41.9 ANXIETY: ICD-10-CM

## 2022-08-05 DIAGNOSIS — Z13.29 SCREENING FOR THYROID DISORDER: ICD-10-CM

## 2022-08-05 DIAGNOSIS — I10 ESSENTIAL HYPERTENSION: Primary | ICD-10-CM

## 2022-08-05 DIAGNOSIS — I10 ESSENTIAL HYPERTENSION: ICD-10-CM

## 2022-08-05 DIAGNOSIS — E78.2 MIXED HYPERLIPIDEMIA: ICD-10-CM

## 2022-08-05 DIAGNOSIS — Z13.1 SCREENING FOR DIABETES MELLITUS: ICD-10-CM

## 2022-08-05 LAB
ALBUMIN SERPL-MCNC: 4.6 G/DL (ref 3.5–5.2)
ALP BLD-CCNC: 80 U/L (ref 40–130)
ALT SERPL-CCNC: 21 U/L (ref 5–41)
ANION GAP SERPL CALCULATED.3IONS-SCNC: 15 MMOL/L (ref 7–19)
AST SERPL-CCNC: 22 U/L (ref 5–40)
BASOPHILS ABSOLUTE: 0 K/UL (ref 0–0.2)
BASOPHILS RELATIVE PERCENT: 0.7 % (ref 0–1)
BILIRUB SERPL-MCNC: 1.1 MG/DL (ref 0.2–1.2)
BUN BLDV-MCNC: 15 MG/DL (ref 6–20)
CALCIUM SERPL-MCNC: 9.6 MG/DL (ref 8.6–10)
CHLORIDE BLD-SCNC: 99 MMOL/L (ref 98–111)
CHOLESTEROL, FASTING: 215 MG/DL (ref 160–199)
CO2: 26 MMOL/L (ref 22–29)
CREAT SERPL-MCNC: 0.5 MG/DL (ref 0.5–1.2)
EOSINOPHILS ABSOLUTE: 0.1 K/UL (ref 0–0.6)
EOSINOPHILS RELATIVE PERCENT: 1.1 % (ref 0–5)
GFR AFRICAN AMERICAN: >59
GFR NON-AFRICAN AMERICAN: >60
GLUCOSE FASTING: 80 MG/DL (ref 74–109)
HBA1C MFR BLD: 5.4 % (ref 4–6)
HCT VFR BLD CALC: 49 % (ref 42–52)
HDLC SERPL-MCNC: 53 MG/DL (ref 55–121)
HEMOGLOBIN: 15.7 G/DL (ref 14–18)
IMMATURE GRANULOCYTES #: 0 K/UL
LDL CHOLESTEROL CALCULATED: 150 MG/DL
LYMPHOCYTES ABSOLUTE: 2.3 K/UL (ref 1.1–4.5)
LYMPHOCYTES RELATIVE PERCENT: 42.5 % (ref 20–40)
MCH RBC QN AUTO: 30.1 PG (ref 27–31)
MCHC RBC AUTO-ENTMCNC: 32 G/DL (ref 33–37)
MCV RBC AUTO: 94 FL (ref 80–94)
MONOCYTES ABSOLUTE: 0.5 K/UL (ref 0–0.9)
MONOCYTES RELATIVE PERCENT: 8.9 % (ref 0–10)
NEUTROPHILS ABSOLUTE: 2.6 K/UL (ref 1.5–7.5)
NEUTROPHILS RELATIVE PERCENT: 46.8 % (ref 50–65)
PDW BLD-RTO: 12.1 % (ref 11.5–14.5)
PLATELET # BLD: 180 K/UL (ref 130–400)
PMV BLD AUTO: 9.8 FL (ref 9.4–12.4)
POTASSIUM SERPL-SCNC: 4.3 MMOL/L (ref 3.5–5)
RBC # BLD: 5.21 M/UL (ref 4.7–6.1)
SODIUM BLD-SCNC: 140 MMOL/L (ref 136–145)
TOTAL PROTEIN: 7 G/DL (ref 6.6–8.7)
TRIGLYCERIDE, FASTING: 58 MG/DL (ref 0–149)
TSH SERPL DL<=0.05 MIU/L-ACNC: 2.26 UIU/ML (ref 0.27–4.2)
WBC # BLD: 5.5 K/UL (ref 4.8–10.8)

## 2022-08-06 ENCOUNTER — HOSPITAL ENCOUNTER (EMERGENCY)
Age: 37
Discharge: HOME | End: 2022-08-06
Payer: MEDICARE

## 2022-08-06 VITALS
RESPIRATION RATE: 17 BRPM | SYSTOLIC BLOOD PRESSURE: 136 MMHG | DIASTOLIC BLOOD PRESSURE: 96 MMHG | TEMPERATURE: 99.1 F | OXYGEN SATURATION: 92 % | HEART RATE: 98 BPM

## 2022-08-06 DIAGNOSIS — N39.0 URINARY TRACT INFECTION WITH HEMATURIA, SITE UNSPECIFIED: Primary | ICD-10-CM

## 2022-08-06 DIAGNOSIS — R31.9 URINARY TRACT INFECTION WITH HEMATURIA, SITE UNSPECIFIED: Primary | ICD-10-CM

## 2022-08-06 LAB
BACTERIA: NEGATIVE /HPF
BILIRUBIN URINE: NEGATIVE
BLOOD, URINE: ABNORMAL
CLARITY: ABNORMAL
COLOR: YELLOW
CRYSTALS, UA: ABNORMAL /HPF
EPITHELIAL CELLS, UA: 0 /HPF (ref 0–5)
GLUCOSE URINE: NEGATIVE MG/DL
HYALINE CASTS: 2 /HPF (ref 0–8)
KETONES, URINE: 15 MG/DL
LEUKOCYTE ESTERASE, URINE: ABNORMAL
NITRITE, URINE: NEGATIVE
PH UA: 5.5 (ref 5–8)
PROTEIN UA: 30 MG/DL
RBC UA: 5 /HPF (ref 0–4)
SPECIFIC GRAVITY UA: 1.01 (ref 1–1.03)
UROBILINOGEN, URINE: 1 E.U./DL
WBC UA: 570 /HPF (ref 0–5)

## 2022-08-06 PROCEDURE — 81001 URINALYSIS AUTO W/SCOPE: CPT

## 2022-08-06 PROCEDURE — 87086 URINE CULTURE/COLONY COUNT: CPT

## 2022-08-06 PROCEDURE — 99283 EMERGENCY DEPT VISIT LOW MDM: CPT

## 2022-08-06 PROCEDURE — 87186 SC STD MICRODIL/AGAR DIL: CPT

## 2022-08-06 RX ORDER — SULFAMETHOXAZOLE AND TRIMETHOPRIM 800; 160 MG/1; MG/1
1 TABLET ORAL 2 TIMES DAILY
Qty: 20 TABLET | Refills: 0 | Status: SHIPPED | OUTPATIENT
Start: 2022-08-06 | End: 2022-08-16

## 2022-08-06 RX ORDER — SULFAMETHOXAZOLE AND TRIMETHOPRIM 800; 160 MG/1; MG/1
1 TABLET ORAL ONCE
Status: DISCONTINUED | OUTPATIENT
Start: 2022-08-06 | End: 2022-08-06 | Stop reason: HOSPADM

## 2022-08-07 NOTE — ED PROVIDER NOTES
[facility] EMERGENCY DEPT  eMERGENCY dEPARTMENT eNCOUnter      Pt Name: Alexandra Mejias  MRN: [id number]  [place] 1985  Date of evaluation: 8/6/2022  Provider: DEVON Rachel    CHIEF COMPLAINT       Chief Complaint   Patient presents with    Hematuria     Pt states has a kidney stone but today started having symptoms of a UTI         HISTORY OF PRESENT ILLNESS   (Location/Symptom, Timing/Onset,Context/Setting, Quality, Duration, Modifying Factors, Severity)  Note limiting factors. Alexandra Mejias is a 39 y.o. male who presents to the emergency department with his parents who are his caregivers. He has a history of spina bifida and is in a wheelchair. He is cathed 5 x daily for urine. Mom noticed some flakes of blood in it today and it looks cloudy. Had a UTI in  may and has them frequently. HAs a kidney stone in the right kidney that is being followed in [place]. Denies pain , vomiting or fever    The history is provided by the patient, a caregiver and a parent. Hematuria  This is a new problem. The current episode started today. The problem is unchanged. He describes the hematuria as gross hematuria. His pain is at a severity of 0/10. He describes his urine color as yellow. NursingNotes were reviewed. REVIEW OF SYSTEMS    (2-9 systems for level 4, 10 or more for level 5)     Review of Systems   Genitourinary:  Positive for hematuria. Except as noted above the remainder of the review of systems was reviewed and negative. PAST MEDICAL HISTORY     Past Medical History:   Diagnosis Date    Anxiety     Chronic kidney disease     kidney stones started at age 5, treated at [facility].   Has had lithotripsy and surgery to remove stones    Club Foot     Congenital paraplegia ([address]     Hypertension     diagnosed at age 6    Leg fracture, left     while in body cast due to swelling    Myelomeningocele with hydrocephalus ([facility] [address] 1985    Open spina bifida at birth    Neuropathy Open knee wound, left, subsequent encounter 1/13/2022    Pressure sore on ankle     and knee     Scoliosis     Spastic neurogenic bladder     cather x 5 a day     Ulcer of penis 10/31/2016     (ventriculoperitoneal) shunt status          SURGICALHISTORY       Past Surgical History:   Procedure Laterality Date    HERNIA REPAIR      groin    HIP SURGERY Left     LITHOTRIPSY      VENTRICULOPERITONEAL SHUNT      with revisions          CURRENT MEDICATIONS       Discharge Medication List as of 8/6/2022  8:07 PM        CONTINUE these medications which have NOT CHANGED    Details   lisinopril (PRINIVIL;ZESTRIL) 10 MG tablet TAKE 1 TABLET BY MOUTH DAILY FOR BLOOD PRESSURE, Disp-30 tablet, R-0Normal      busPIRone (BUSPAR) 5 MG tablet TAKE 1 TABLET three times a day  AS NEEDED FOR ANXIETY, Disp-90 tablet, R-11$Normal      !! Catheters (SELF-CATH PLUS COUDE TIP) MISC Disp-200 each, R-5, PrintUse as needed  (10 [place] 16 inch)- self catheterize up to 6 times a day. Needs 200 a month  Dx:Spina Bifida      !! Catheters MISC Disp-180 each, R-11, NormalCATHETER 6 TIMES DAILY IN & OUT *Q05.9*      Omega-3 Fatty Acids (THE VERY FINEST FISH OIL) LIQD TAKE 3 ML BY MOUTH DAILY, Disp-200 mL, R-5Normal      D-Mannose POWD 1 Units by Does not apply route 2 times dailyHistorical Med      solifenacin (VESICARE) 5 MG tablet Take 2 tablets by mouth daily, Disp-45 tablet, R-11Normal      Cranberry 200 MG CAPS Take 1 capsule by mouth dailyHistorical Med      menthol-zinc oxide (CALMOSEPTINE) 0.44-20.6 % OINT ointment Apply topically daily Max 30 ml per day., Topical, DAILY, Until Discontinued, Historical Med      aspirin 81 MG chewable tablet Take 81 mg by mouth daily. !! - Potential duplicate medications found. Please discuss with provider.           ALLERGIES     Latex, Ceftriaxone, Pravastatin, and Vancomycin    FAMILY HISTORY       Family History   Problem Relation Age of Onset    Colon Cancer Mother 54        In remission    High Cholesterol Mother     High Blood Pressure Mother     Thyroid Disease Mother         Hypothyroidism    Coronary Art Dis Maternal Grandmother         CABG x 2    Heart Attack Maternal Grandmother     High Blood Pressure Maternal Grandmother     Cancer Maternal Grandmother         Pancreatic Cancer    Heart Failure Maternal Grandfather          in     Immunodeficiency Neg Hx           SOCIAL HISTORY       Social History     Socioeconomic History    Marital status: Single     Spouse name: None    Number of children: None    Years of education: None    Highest education level: None   Tobacco Use    Smoking status: Never    Smokeless tobacco: Never   Vaping Use    Vaping Use: Never used   Substance and Sexual Activity    Alcohol use: No    Drug use: No    Sexual activity: Never   Social History Narrative    Adult living with his mother, does his own transfers, sleeps on twin mattress on the floor     Not working, on disability, enjoys painting, goes to [facility] most days     Social Determinants of Health     Financial Resource Strain: Low Risk     Difficulty of Paying Living Expenses: Not hard at all   Food Insecurity: No Food Insecurity    Worried About [employer] [employer] in the Last Year: Never true    [address]  [employer] in the Last Year: Never true       SCREENINGS    [place] Coma Scale  Eye Opening: Spontaneous  Best Verbal Response: Oriented  Best Motor Response: Obeys commands  Sherman Coma Scale Score: 15 @FLOW([id number])@      PHYSICAL EXAM    (up to 7 for level 4, 8 or more for level 5)     ED Triage Vitals [22 1732]   BP Temp Temp Source Heart Rate Resp SpO2 Height Weight   (!) 138/95 99.1 °F (37.3 °C) Oral (!) 137 18 94 % -- --       Physical Exam  Vitals and nursing note reviewed. Constitutional:       Appearance: Normal appearance. He is well-developed. HENT:      Head: Normocephalic and atraumatic. Eyes:      General: No scleral icterus. Right eye: No discharge.          Left eye: No discharge. Cardiovascular:      Rate and Rhythm: Normal rate. Pulmonary:      Effort: No respiratory distress. Musculoskeletal:      Cervical back: Normal range of motion and neck supple. Comments: Paraplegia with lower limb deformities. Neurological:      Mental Status: He is alert and oriented to person, place, and time.    Psychiatric:         Behavior: Behavior normal.       DIAGNOSTIC RESULTS     EKG: All EKG's are interpreted by the Emergency Department Physician who either signs or Co-signsthis chart in the absence of a cardiologist.        RADIOLOGY:   Kelvin Allis such as CT, Ultrasound and MRI are read by the radiologist. Plain radiographic images are visualized and preliminarily interpreted by the emergency physician with the below findings:      Interpretation per the Radiologist below, if available at the time of this note:    No orders to display         ED BEDSIDEULTRASOUND:   Performed by ED Physician -none    LABS:  Labs Reviewed   CULTURE, URINE - Abnormal; Notable for the following components:       Result Value    Urine Culture, Routine >100,000 CFU/ml (*)     Organism Citrobacter youngae (*)     All other components within normal limits    Narrative:     ORDER#: [id number]                          ORDERED BY: CURT ROWAN  SOURCE: Urine Clean Catch                  COLLECTED:  08/06/22 18:27  ANTIBIOTICS AT [facility].:                      RECEIVED :  08/06/22 18:30   URINALYSIS WITH REFLEX TO CULTURE - Abnormal; Notable for the following components:    Clarity, UA TURBID (*)     Ketones, Urine 15 (*)     Blood, Urine LARGE (*)     Protein, UA 30 (*)     Leukocyte Esterase, Urine LARGE (*)     All other components within normal limits   MICROSCOPIC URINALYSIS - Abnormal; Notable for the following components:    Bacteria, UA NEGATIVE (*)     Crystals, UA NEG (*)     WBC,  (*)     RBC, UA 5 (*)     All other components within normal limits       All other labs were

## 2022-08-08 LAB
ORGANISM: ABNORMAL
URINE CULTURE, ROUTINE: ABNORMAL
URINE CULTURE, ROUTINE: ABNORMAL

## 2022-08-09 ENCOUNTER — OFFICE VISIT (OUTPATIENT)
Dept: PRIMARY CARE CLINIC | Age: 37
End: 2022-08-09
Payer: MEDICARE

## 2022-08-09 VITALS
OXYGEN SATURATION: 97 % | TEMPERATURE: 98.7 F | WEIGHT: 72 LBS | HEART RATE: 126 BPM | BODY MASS INDEX: 14.14 KG/M2 | DIASTOLIC BLOOD PRESSURE: 72 MMHG | HEIGHT: 60 IN | SYSTOLIC BLOOD PRESSURE: 138 MMHG

## 2022-08-09 DIAGNOSIS — I10 ESSENTIAL HYPERTENSION: ICD-10-CM

## 2022-08-09 DIAGNOSIS — Q05.9 SPINA BIFIDA, UNSPECIFIED HYDROCEPHALUS PRESENCE, UNSPECIFIED SPINAL REGION (HCC): ICD-10-CM

## 2022-08-09 DIAGNOSIS — T83.511A URINARY TRACT INFECTION ASSOCIATED WITH CATHETERIZATION OF URINARY TRACT, UNSPECIFIED INDWELLING URINARY CATHETER TYPE, INITIAL ENCOUNTER (HCC): ICD-10-CM

## 2022-08-09 DIAGNOSIS — E78.5 DYSLIPIDEMIA: Primary | ICD-10-CM

## 2022-08-09 DIAGNOSIS — N39.0 URINARY TRACT INFECTION ASSOCIATED WITH CATHETERIZATION OF URINARY TRACT, UNSPECIFIED INDWELLING URINARY CATHETER TYPE, INITIAL ENCOUNTER (HCC): ICD-10-CM

## 2022-08-09 PROCEDURE — G8427 DOCREV CUR MEDS BY ELIG CLIN: HCPCS | Performed by: FAMILY MEDICINE

## 2022-08-09 PROCEDURE — 1036F TOBACCO NON-USER: CPT | Performed by: FAMILY MEDICINE

## 2022-08-09 PROCEDURE — G8420 CALC BMI NORM PARAMETERS: HCPCS | Performed by: FAMILY MEDICINE

## 2022-08-09 PROCEDURE — 99213 OFFICE O/P EST LOW 20 MIN: CPT | Performed by: FAMILY MEDICINE

## 2022-08-09 RX ORDER — LISINOPRIL 10 MG/1
TABLET ORAL
Qty: 90 TABLET | Refills: 0 | Status: SHIPPED | OUTPATIENT
Start: 2022-08-09 | End: 2022-09-16 | Stop reason: SDUPTHER

## 2022-08-09 ASSESSMENT — ENCOUNTER SYMPTOMS
RESPIRATORY NEGATIVE: 1
GASTROINTESTINAL NEGATIVE: 1
EYES NEGATIVE: 1

## 2022-08-09 NOTE — PROGRESS NOTES
[address] [facility]  [address] [facility] [address] [address]  Dept: [phone]  Dept Fax: [phone]  Loc: [phone]      Subjective:     Chief Complaint   Patient presents with    Annual Exam     Went to ER Sat and has UTI - currently being treated        HPI:  Piedad Darling is a 39 y.o. male presents today for follow-up or recent ER visit. He was seen for UTI. He state he noted a speck of blood in his urine. He also had a low grade fever before he was started on abx. He states that he did not notice any blood in his urine today and it looks clearer. No nausea or vomiting. He is only on Day 3 of his abx. He is also here to go over the results of his recent blood work. Cholesterol elevated. Pt admits that his diet is not the greatest. He eats a lot of processed food. ROS:   Review of Systems   Constitutional: Negative. Activity change:  no change, pt is wheelchair bound. HENT: Negative. Eyes: Negative. Respiratory: Negative. Cardiovascular: Negative. Gastrointestinal: Negative. Endocrine: Negative. Genitourinary: Negative. Negative for dysuria, flank pain, penile discharge and urgency. Musculoskeletal: Negative. Neurological: Negative. Hematological: Negative. Psychiatric/Behavioral:  The patient is nervous/anxious. PMHx:  Past Medical History:   Diagnosis Date    Anxiety     Chronic kidney disease     kidney stones started at age 5, treated at [facility].   Has had lithotripsy and surgery to remove stones    Club Foot     Congenital paraplegia ([facility]     Hypertension     diagnosed at age 6    Leg fracture, left     while in body cast due to swelling    Myelomeningocele with hydrocephalus ([address] 1985    Open spina bifida at birth    Neuropathy     Open knee wound, left, subsequent encounter 1/13/2022    Pressure sore on ankle     and knee     Scoliosis     Spastic neurogenic bladder     cather x 5 a day Ulcer of penis 10/31/2016     (ventriculoperitoneal) shunt status      Patient Active Problem List   Diagnosis    Spina bifida ([facility]    Kidney stones    Family history of malignant melanoma of skin    Catheter-associated urinary tract infection ([facility] [address]    Myelomeningocele with hydrocephalus ([facility] [address]    Congenital flaccid paralysis (HCC)    Panic attack    Anxiety    Essential hypertension    Keloid    Open knee wound, left, subsequent encounter       PSHx:  Past Surgical History:   Procedure Laterality Date    HERNIA REPAIR      groin    HIP SURGERY Left     LITHOTRIPSY      VENTRICULOPERITONEAL SHUNT      with revisions        PFHx:  Family History   Problem Relation Age of Onset    Colon Cancer Mother 54        In remission    High Cholesterol Mother     High Blood Pressure Mother     Thyroid Disease Mother         Hypothyroidism    Coronary Art Dis Maternal Grandmother         CABG x 2    Heart Attack Maternal Grandmother     High Blood Pressure Maternal Grandmother     Cancer Maternal Grandmother         Pancreatic Cancer    Heart Failure Maternal Grandfather          in     Immunodeficiency Neg Hx        SocialHx:  Social History     Tobacco Use    Smoking status: Never    Smokeless tobacco: Never   Substance Use Topics    Alcohol use: No       Allergies: Allergies   Allergen Reactions    Latex Swelling and Rash     Had reaction at dentist office-redness, swelling, and rash  Other reaction(s): rash  Had reaction at dentist office-redness, swelling, and rash    Ceftriaxone Other (See Comments)     Other reaction(s): Other (see comments)  Fever, hallucinations was transported via ambulance [address]   Other reaction(s): high fever  Fever, hallucinations was transported via ambulance [place]     Pravastatin      Myalgias    Vancomycin Other (See Comments)     Other reaction(s):  Other (See Comments)  Red warm hands and itching  Red warm hands and itching       Medications:  Current Outpatient Medications Medication Sig Dispense Refill    lisinopril (PRINIVIL;ZESTRIL) 10 MG tablet TAKE 1 TABLET BY MOUTH DAILY FOR BLOOD PRESSURE 90 tablet 0    sulfamethoxazole-trimethoprim (BACTRIM DS;SEPTRA DS) 800-160 MG per tablet Take 1 tablet by mouth in the morning and 1 tablet before bedtime. Do all this for 10 days. 20 tablet 0    busPIRone (BUSPAR) 5 MG tablet TAKE 1 TABLET three times a day  AS NEEDED FOR ANXIETY 90 tablet 11    Catheters (SELF-CATH PLUS COUDE TIP) MISC Use as needed  ([address] [place] 16 inch)- self catheterize up to 6 times a day. Needs 200 a month    Dx:Spina Bifida 200 each 5    Catheters MISC CATHETER 6 TIMES DAILY IN & OUT *Q05.9* 180 each 11    Omega-3 Fatty Acids (THE VERY FINEST FISH OIL) LIQD TAKE 3 ML BY MOUTH DAILY 200 mL 5    D-Mannose POWD 1 Units by Does not apply route 2 times daily      solifenacin (VESICARE) 5 MG tablet Take 2 tablets by mouth daily (Patient taking differently: Take 5 mg by mouth in the morning.) 45 tablet 11    Cranberry 200 MG CAPS Take 1 capsule by mouth daily      menthol-zinc oxide (CALMOSEPTINE) 0.44-20.6 % OINT ointment Apply topically daily Max 30 ml per day. aspirin 81 MG chewable tablet Take 81 mg by mouth daily. No current facility-administered medications for this visit. Objective:   PE:  /72   Pulse (!) 126   Temp 98.7 °F (37.1 °C) (Temporal)   Ht 4' (1.219 m)   Wt 72 lb (32.7 kg)   SpO2 97%   BMI 21.97 kg/m²   Physical Exam  Vitals and nursing note reviewed. Exam conducted with a chaperone present (both parents). Constitutional:       General: He is not in acute distress. Appearance: He is not ill-appearing. HENT:      Head: Normocephalic. Mouth/Throat:      Mouth: Mucous membranes are moist.   Eyes:      Pupils: Pupils are equal, round, and reactive to light. Cardiovascular:      Rate and Rhythm: Regular rhythm. Heart sounds: Murmur heard. Systolic murmur is present with a grade of 2/6.    Pulmonary: Effort: Pulmonary effort is normal.      Breath sounds: Normal breath sounds. Abdominal:      Palpations: Abdomen is soft. Tenderness: There is no abdominal tenderness. Musculoskeletal:      Cervical back: Normal range of motion. Thoracic back: Deformity present. Lumbar back: Deformity present. Legs:    Skin:     General: Skin is warm and dry. Neurological:      Mental Status: He is alert and oriented to person, place, and time. Mental status is at baseline. Psychiatric:         Attention and Perception: Attention normal.         Mood and Affect: Mood normal.         Behavior: Behavior normal.         Thought Content: Thought content normal.         Judgment: Judgment normal.          Assessment & Plan   Angelica Graf was seen today for annual exam.    Diagnoses and all orders for this visit:    Dyslipidemia  -     Lipid, Fasting; Future    Essential hypertension  -     lisinopril (PRINIVIL;ZESTRIL) 10 MG tablet; TAKE 1 TABLET BY MOUTH DAILY FOR BLOOD PRESSURE    Spina bifida, unspecified hydrocephalus presence, unspecified spinal region [facility])    Urinary tract infection associated with catheterization of urinary tract, unspecified indwelling urinary catheter type, initial encounter [facility])    Continue present care and management  Continue all maintenance medication  Complete abx  Encouraged low fat diet  Stay well  hydrated - drink at least 64 oz of fluid a day  Eat 6 servings of fruit and vegetables daily  Keep scheduled follow-up appt with me and other providers/specialists  Call with new concerns     Return in 3 months (on 11/9/2022) for routine follow-up. All questions were answered. Medications, including possible adverse effects, and instructions were reviewed and  understanding was confirmed. Follow-up recommendations, including when to contact or return to office (ie; if symptoms worsen or fail to improve), were discussed and acknowledged.     Electronically signed by Nimco Plummer Kris De Leon MD on 8/9/22 at 1:20 PM CDT

## 2022-08-11 RX ORDER — SOLIFENACIN SUCCINATE 5 MG/1
10 TABLET, FILM COATED ORAL DAILY
Qty: 45 TABLET | Refills: 11 | Status: CANCELLED | OUTPATIENT
Start: 2022-08-11

## 2022-08-12 RX ORDER — SOLIFENACIN SUCCINATE 5 MG/1
5 TABLET, FILM COATED ORAL DAILY
Qty: 30 TABLET | Refills: 5 | Status: SHIPPED | OUTPATIENT
Start: 2022-08-12

## 2022-08-12 RX ORDER — OXYBUTYNIN CHLORIDE 5 MG/1
TABLET, EXTENDED RELEASE ORAL
Refills: 11 | Status: CANCELLED | OUTPATIENT
Start: 2022-08-12

## 2022-08-12 NOTE — PROGRESS NOTES
Pt has tried the other alternatives that his insurance covers and none has work for him. The only medication that has helped is Vesicare.

## 2022-09-16 DIAGNOSIS — I10 ESSENTIAL HYPERTENSION: ICD-10-CM

## 2022-09-16 RX ORDER — LISINOPRIL 10 MG/1
TABLET ORAL
Qty: 90 TABLET | Refills: 0 | Status: SHIPPED | OUTPATIENT
Start: 2022-09-16

## 2022-09-16 NOTE — TELEPHONE ENCOUNTER
Mansi Zamora called requesting a refill of the below medication which has been pended for you:     Requested Prescriptions     Pending Prescriptions Disp Refills    lisinopril (PRINIVIL;ZESTRIL) 10 MG tablet 90 tablet 0     Sig: TAKE 1 TABLET BY MOUTH DAILY FOR BLOOD PRESSURE       Last Appointment Date: 8/9/2022  Next Appointment Date: 11/9/2022    Allergies   Allergen Reactions    Latex Swelling and Rash     Had reaction at dentist office-redness, swelling, and rash  Other reaction(s): rash  Had reaction at dentist office-redness, swelling, and rash    Ceftriaxone Other (See Comments)     Other reaction(s): Other (see comments)  Fever, hallucinations was transported via ambulance [address]   Other reaction(s): high fever  Fever, hallucinations was transported via ambulance [place]     Pravastatin      Myalgias    Vancomycin Other (See Comments)     Other reaction(s):  Other (See Comments)  Red warm hands and itching  Red warm hands and itching

## 2022-10-12 ENCOUNTER — OFFICE VISIT (OUTPATIENT)
Dept: PRIMARY CARE CLINIC | Age: 37
End: 2022-10-12
Payer: MEDICARE

## 2022-10-12 VITALS
WEIGHT: 70.5 LBS | DIASTOLIC BLOOD PRESSURE: 86 MMHG | HEART RATE: 114 BPM | HEIGHT: 60 IN | SYSTOLIC BLOOD PRESSURE: 118 MMHG | TEMPERATURE: 99.2 F | OXYGEN SATURATION: 94 % | BODY MASS INDEX: 13.84 KG/M2

## 2022-10-12 DIAGNOSIS — R30.0 DYSURIA: ICD-10-CM

## 2022-10-12 DIAGNOSIS — N39.0 URINARY TRACT INFECTION ASSOCIATED WITH CATHETERIZATION OF URINARY TRACT, UNSPECIFIED INDWELLING URINARY CATHETER TYPE, INITIAL ENCOUNTER (HCC): ICD-10-CM

## 2022-10-12 DIAGNOSIS — N39.0 RECURRENT UTI: ICD-10-CM

## 2022-10-12 DIAGNOSIS — R30.0 DYSURIA: Primary | ICD-10-CM

## 2022-10-12 DIAGNOSIS — T83.511A URINARY TRACT INFECTION ASSOCIATED WITH CATHETERIZATION OF URINARY TRACT, UNSPECIFIED INDWELLING URINARY CATHETER TYPE, INITIAL ENCOUNTER (HCC): ICD-10-CM

## 2022-10-12 LAB
APPEARANCE FLUID: ABNORMAL
BILIRUBIN, POC: NEGATIVE
BLOOD URINE, POC: ABNORMAL
CLARITY, POC: ABNORMAL
COLOR, POC: ABNORMAL
GLUCOSE URINE, POC: NEGATIVE
KETONES, POC: NEGATIVE
LEUKOCYTE EST, POC: ABNORMAL
NITRITE, POC: NEGATIVE
PH, POC: 7
PROTEIN, POC: 30
SPECIFIC GRAVITY, POC: 1.01
UROBILINOGEN, POC: 0.2

## 2022-10-12 PROCEDURE — 1036F TOBACCO NON-USER: CPT | Performed by: FAMILY MEDICINE

## 2022-10-12 PROCEDURE — G8420 CALC BMI NORM PARAMETERS: HCPCS | Performed by: FAMILY MEDICINE

## 2022-10-12 PROCEDURE — 99213 OFFICE O/P EST LOW 20 MIN: CPT | Performed by: FAMILY MEDICINE

## 2022-10-12 PROCEDURE — G8427 DOCREV CUR MEDS BY ELIG CLIN: HCPCS | Performed by: FAMILY MEDICINE

## 2022-10-12 PROCEDURE — G8484 FLU IMMUNIZE NO ADMIN: HCPCS | Performed by: FAMILY MEDICINE

## 2022-10-12 PROCEDURE — 81002 URINALYSIS NONAUTO W/O SCOPE: CPT | Performed by: FAMILY MEDICINE

## 2022-10-12 RX ORDER — NITROFURANTOIN MACROCRYSTALS 100 MG/1
100 CAPSULE ORAL 2 TIMES DAILY
Qty: 14 CAPSULE | Refills: 0 | Status: SHIPPED | OUTPATIENT
Start: 2022-10-12 | End: 2022-10-19

## 2022-10-12 ASSESSMENT — ENCOUNTER SYMPTOMS
EYES NEGATIVE: 1
NAUSEA: 0
GASTROINTESTINAL NEGATIVE: 1
RESPIRATORY NEGATIVE: 1
VOMITING: 0

## 2022-10-12 NOTE — PROGRESS NOTES
[address] [facility]  [address] [address] [address]  [address] [address]  Dept: [phone]  Dept Fax: [phone]  Loc: [phone]      Subjective:     Chief Complaint   Patient presents with    Urinary Tract Infection     Symptoms begin yesterday        HPI:  Giovanny Roberto is a 40 y.o. male presents today with another episode of UTI. This is his 4th episode this year. He is scheduled to see his urologist in [place] next week. ROS:   Review of Systems   Constitutional: Negative. Negative for chills and fever. Activity change:  no change, pt is wheelchair bound. HENT: Negative. Eyes: Negative. Respiratory: Negative. Cardiovascular: Negative. Gastrointestinal: Negative. Negative for nausea and vomiting. Endocrine: Negative. Genitourinary:  Positive for flank pain. Negative for dysuria and penile discharge. Pt self catheterize 6 times a day as needed   Neurological: Negative. Hematological: Negative. Psychiatric/Behavioral:  The patient is not nervous/anxious. PMHx:  Past Medical History:   Diagnosis Date    Anxiety     Chronic kidney disease     kidney stones started at age 5, treated at [facility].   Has had lithotripsy and surgery to remove stones    Club Foot     Congenital paraplegia ([address]     Hypertension     diagnosed at age 6    Leg fracture, left     while in body cast due to swelling    Myelomeningocele with hydrocephalus ([address] 1985    Open spina bifida at birth    Neuropathy     Open knee wound, left, subsequent encounter 1/13/2022    Pressure sore on ankle     and knee     Scoliosis     Spastic neurogenic bladder     cather x 5 a day     Ulcer of penis 10/31/2016     (ventriculoperitoneal) shunt status      Patient Active Problem List   Diagnosis    Spina bifida ([address]    Kidney stones    Family history of malignant melanoma of skin    Catheter-associated urinary tract infection ([address]    Myelomeningocele with hydrocephalus ([facility]    Congenital flaccid paralysis (HCC)    Panic attack    Anxiety    Essential hypertension    Keloid    Open knee wound, left, subsequent encounter       PSHx:  Past Surgical History:   Procedure Laterality Date    HERNIA REPAIR      groin    HIP SURGERY Left     LITHOTRIPSY      VENTRICULOPERITONEAL SHUNT      with revisions        PFHx:  Family History   Problem Relation Age of Onset    Colon Cancer Mother 54        In remission    High Cholesterol Mother     High Blood Pressure Mother     Thyroid Disease Mother         Hypothyroidism    Coronary Art Dis Maternal Grandmother         CABG x 2    Heart Attack Maternal Grandmother     High Blood Pressure Maternal Grandmother     Cancer Maternal Grandmother         Pancreatic Cancer    Heart Failure Maternal Grandfather          in     Immunodeficiency Neg Hx        SocialHx:  Social History     Tobacco Use    Smoking status: Never    Smokeless tobacco: Never   Substance Use Topics    Alcohol use: No       Allergies: Allergies   Allergen Reactions    Latex Swelling and Rash     Had reaction at dentist office-redness, swelling, and rash  Other reaction(s): rash  Had reaction at dentist office-redness, swelling, and rash    Ceftriaxone Other (See Comments)     Other reaction(s): Other (see comments)  Fever, hallucinations was transported via ambulance [address]   Other reaction(s): high fever  Fever, hallucinations was transported via ambulance [place]     Pravastatin      Myalgias    Vancomycin Other (See Comments)     Other reaction(s): Other (See Comments)  Red warm hands and itching  Red warm hands and itching       Medications:  Current Outpatient Medications   Medication Sig Dispense Refill    nitrofurantoin (MACRODANTIN) 100 MG capsule Take 1 capsule by mouth in the morning and 1 capsule in the evening. Do all this for 7 days.  14 capsule 0    lisinopril (PRINIVIL;ZESTRIL) 10 MG tablet TAKE 1 TABLET BY MOUTH DAILY FOR BLOOD PRESSURE 90 tablet 0    solifenacin (VESICARE) 5 MG tablet Take 1 tablet by mouth in the morning. 30 tablet 5    busPIRone (BUSPAR) 5 MG tablet TAKE 1 TABLET three times a day  AS NEEDED FOR ANXIETY 90 tablet 11    Catheters (SELF-CATH PLUS COUDE TIP) MISC Use as needed  ([address] [place] 16 inch)- self catheterize up to 6 times a day. Needs 200 a month    Dx:Spina Bifida 200 each 5    Catheters MISC CATHETER 6 TIMES DAILY IN & OUT *Q05.9* 180 each 11    Omega-3 Fatty Acids (THE VERY FINEST FISH OIL) LIQD TAKE 3 ML BY MOUTH DAILY 200 mL 5    D-Mannose POWD 1 Units by Does not apply route 2 times daily      Cranberry 200 MG CAPS Take 1 capsule by mouth daily      menthol-zinc oxide (CALMOSEPTINE) 0.44-20.6 % OINT ointment Apply topically daily Max 30 ml per day. aspirin 81 MG chewable tablet Take 81 mg by mouth daily. No current facility-administered medications for this visit. Objective:   PE:  /86   Pulse (!) 114   Temp 99.2 °F (37.3 °C)   Ht 4' (1.219 m)   Wt 70 lb 8 oz (32 kg)   SpO2 94%   BMI 21.51 kg/m²   Physical Exam  Vitals and nursing note reviewed. Exam conducted with a chaperone present (both parents). Constitutional:       General: He is not in acute distress. Appearance: He is not ill-appearing. HENT:      Head: Normocephalic. Mouth/Throat:      Mouth: Mucous membranes are moist.      Dentition: Abnormal dentition. Eyes:      Pupils: Pupils are equal, round, and reactive to light. Cardiovascular:      Rate and Rhythm: Regular rhythm. Heart sounds: Murmur heard. Systolic murmur is present with a grade of 2/6. Pulmonary:      Effort: Pulmonary effort is normal.      Breath sounds: Normal breath sounds. Abdominal:      Palpations: Abdomen is soft. Tenderness: There is no abdominal tenderness. Musculoskeletal:      Cervical back: Normal range of motion. Thoracic back: Deformity present. Lumbar back: Deformity present. Legs:    Skin:     General: Skin is warm and dry. Neurological:      Mental Status: He is alert and oriented to person, place, and time. Mental status is at baseline. Psychiatric:         Attention and Perception: Attention normal.         Mood and Affect: Mood normal.         Behavior: Behavior normal.          Assessment & Plan   Karen Preston was seen today for urinary tract infection. Diagnoses and all orders for this visit:    Dysuria  -     POCT Urinalysis no Micro  -     Culture, Urine; Future    Urinary tract infection associated with catheterization of urinary tract, unspecified indwelling urinary catheter type, initial encounter ([facility]  -     nitrofurantoin (MACRODANTIN) 100 MG capsule; Take 1 capsule by mouth in the morning and 1 capsule in the evening. Do all this for 7 days. -     Urinalysis with Reflex to Culture; Future  -     Culture, Urine; Future    Recurrent UTI    Consider prophylactic abx for recurrent UTI. He will discuss this with his urologist  when he sees him next week    Return in 4 weeks (on 11/9/2022) for routine follow-up. All questions were answered. Medications, including possible adverse effects, and instructions were reviewed and  understanding was confirmed. Follow-up recommendations, including when to contact or return to office (ie; if symptoms worsen or fail to improve), were discussed and acknowledged.     Electronically signed by Steve Cottrell MD on 10/12/22 at 10:49 AM CDT

## 2022-10-14 LAB
ORGANISM: ABNORMAL
URINE CULTURE, ROUTINE: ABNORMAL
URINE CULTURE, ROUTINE: ABNORMAL

## 2022-10-19 ENCOUNTER — TELEPHONE (OUTPATIENT)
Dept: PRIMARY CARE CLINIC | Age: 37
End: 2022-10-19

## 2022-10-19 DIAGNOSIS — N39.0 RECURRENT UTI: ICD-10-CM

## 2022-10-19 DIAGNOSIS — N39.0 RECURRENT UTI: Primary | ICD-10-CM

## 2022-10-19 LAB
BACTERIA: NEGATIVE /HPF
BILIRUBIN URINE: NEGATIVE
BLOOD, URINE: NEGATIVE
CLARITY: CLEAR
COLOR: YELLOW
CRYSTALS, UA: ABNORMAL /HPF
EPITHELIAL CELLS, UA: 0 /HPF (ref 0–5)
GLUCOSE URINE: NEGATIVE MG/DL
HYALINE CASTS: 0 /HPF (ref 0–8)
KETONES, URINE: NEGATIVE MG/DL
LEUKOCYTE ESTERASE, URINE: ABNORMAL
NITRITE, URINE: NEGATIVE
PH UA: 6.5 (ref 5–8)
PROTEIN UA: NEGATIVE MG/DL
RBC UA: 0 /HPF (ref 0–4)
SPECIFIC GRAVITY UA: 1 (ref 1–1.03)
UROBILINOGEN, URINE: 0.2 E.U./DL
WBC UA: 1 /HPF (ref 0–5)

## 2022-11-04 DIAGNOSIS — E78.5 DYSLIPIDEMIA: ICD-10-CM

## 2022-11-04 LAB
CHOLESTEROL, FASTING: 191 MG/DL (ref 160–199)
HDLC SERPL-MCNC: 48 MG/DL (ref 55–121)
LDL CHOLESTEROL CALCULATED: 132 MG/DL
TRIGLYCERIDE, FASTING: 56 MG/DL (ref 0–149)

## 2022-11-08 RX ORDER — OMEGA-3/DHA/EPA/FISH OIL 1600MG/5ML
LIQUID (ML) ORAL
Qty: 200 ML | Refills: 5 | Status: SHIPPED | OUTPATIENT
Start: 2022-11-08

## 2022-11-08 NOTE — TELEPHONE ENCOUNTER
Helen Sinclair called to request a refill on his medication.       Last office visit : 10/12/2022   Next office visit : 11/9/2022     Requested Prescriptions     Pending Prescriptions Disp Refills    Omega-3 Fatty Acids (THE VERY FINEST FISH OIL) LIQD [Pharmacy Med Name: THE VERY FINEST FISH OIL 500MG/5ML-800MG/5ML-1600MG/5ML-10UNIT/5ML LIQUID] 200 mL 5     Sig: TAKE 3 ML BY [address], LPN

## 2022-11-09 ENCOUNTER — OFFICE VISIT (OUTPATIENT)
Dept: PRIMARY CARE CLINIC | Age: 37
End: 2022-11-09
Payer: MEDICARE

## 2022-11-09 VITALS
HEART RATE: 133 BPM | OXYGEN SATURATION: 97 % | WEIGHT: 70.7 LBS | TEMPERATURE: 98 F | HEIGHT: 60 IN | SYSTOLIC BLOOD PRESSURE: 116 MMHG | DIASTOLIC BLOOD PRESSURE: 84 MMHG | BODY MASS INDEX: 13.88 KG/M2

## 2022-11-09 DIAGNOSIS — Z23 NEED FOR INFLUENZA VACCINATION: ICD-10-CM

## 2022-11-09 DIAGNOSIS — E78.2 MIXED HYPERLIPIDEMIA: Primary | ICD-10-CM

## 2022-11-09 DIAGNOSIS — Z87.440 HISTORY OF RECURRENT UTI (URINARY TRACT INFECTION): ICD-10-CM

## 2022-11-09 DIAGNOSIS — I10 ESSENTIAL HYPERTENSION: ICD-10-CM

## 2022-11-09 PROBLEM — S81.002D OPEN KNEE WOUND, LEFT, SUBSEQUENT ENCOUNTER: Status: ACTIVE | Noted: 2022-01-13

## 2022-11-09 PROCEDURE — G0008 ADMIN INFLUENZA VIRUS VAC: HCPCS | Performed by: FAMILY MEDICINE

## 2022-11-09 PROCEDURE — 3078F DIAST BP <80 MM HG: CPT | Performed by: FAMILY MEDICINE

## 2022-11-09 PROCEDURE — 3074F SYST BP LT 130 MM HG: CPT | Performed by: FAMILY MEDICINE

## 2022-11-09 PROCEDURE — 99214 OFFICE O/P EST MOD 30 MIN: CPT | Performed by: FAMILY MEDICINE

## 2022-11-09 PROCEDURE — G8427 DOCREV CUR MEDS BY ELIG CLIN: HCPCS | Performed by: FAMILY MEDICINE

## 2022-11-09 PROCEDURE — 90674 CCIIV4 VAC NO PRSV 0.5 ML IM: CPT | Performed by: FAMILY MEDICINE

## 2022-11-09 PROCEDURE — G8420 CALC BMI NORM PARAMETERS: HCPCS | Performed by: FAMILY MEDICINE

## 2022-11-09 PROCEDURE — 1036F TOBACCO NON-USER: CPT | Performed by: FAMILY MEDICINE

## 2022-11-09 PROCEDURE — G8482 FLU IMMUNIZE ORDER/ADMIN: HCPCS | Performed by: FAMILY MEDICINE

## 2022-11-09 RX ORDER — NITROFURANTOIN 25; 75 MG/1; MG/1
100 CAPSULE ORAL DAILY
COMMUNITY
Start: 2022-10-24 | End: 2023-01-23

## 2022-11-09 ASSESSMENT — ENCOUNTER SYMPTOMS
VOMITING: 0
GASTROINTESTINAL NEGATIVE: 1
RESPIRATORY NEGATIVE: 1
EYES NEGATIVE: 1
NAUSEA: 0

## 2022-11-09 NOTE — PROGRESS NOTES
After obtaining consent, and per orders of Dr. Alejo Bolden, injection of Flucelvax influenza injection given in Right deltoid by Autumn Lowery. Patient signed consent scanned into patients chart.

## 2022-11-09 NOTE — PROGRESS NOTES
[facility] [address] [facility]  [address] [address] [place] [address]  Dept: [phone]  Dept Fax: [phone]  Loc: [phone]      Subjective:     Chief Complaint   Patient presents with    3 Month Follow-Up       HPI:  Mitra Baldwin is a 40 y.o. male presents  today for his routine follow-up visit. He is here to go over his most recent blood work results which all came back essentially normal. His cholesterol has improved significantly. His mother states that since she started preparing meals for him, he is eating healthier,   He is also s/p visit to the urology clinic in [place]. He is now placed on daily Macrobid x 6 months due to recurrent UTI resulting from frequent catheterization. He is also restarted on Myrbetriq 50 mg. Overall, he states he feels well. He is here to get his flu vaccine. ROS:   Review of Systems   Constitutional: Negative. Negative for chills and fever. Activity change:  no change, pt is wheelchair bound. HENT: Negative. Eyes: Negative. Respiratory: Negative. Cardiovascular: Negative. Gastrointestinal: Negative. Negative for nausea and vomiting. Endocrine: Negative. Genitourinary:  Negative for dysuria, flank pain and penile discharge. Pt self catheterize 6 times a day as needed   Neurological: Negative. Hematological: Negative. Psychiatric/Behavioral:  The patient is not nervous/anxious. PMHx:  Past Medical History:   Diagnosis Date    Anxiety     Chronic kidney disease     kidney stones started at age 5, treated at [facility].   Has had lithotripsy and surgery to remove stones    Club Foot     Congenital paraplegia ([facility] [address]     Hypertension     diagnosed at age 6    Leg fracture, left     while in body cast due to swelling    Myelomeningocele with hydrocephalus ([address] 1985    Open spina bifida at birth    Neuropathy     Open knee wound, left, subsequent encounter 1/13/2022    Pressure sore on ankle     and knee     Scoliosis     Spastic neurogenic bladder     cather x 5 a day     Ulcer of penis 10/31/2016     (ventriculoperitoneal) shunt status      Patient Active Problem List   Diagnosis    Spina bifida ([address]    Kidney stones    Family history of malignant melanoma of skin    Catheter-associated urinary tract infection ([address]    Myelomeningocele with hydrocephalus ([address]    Congenital flaccid paralysis (HCC)    Panic attack    Anxiety    Recurrent UTI    Essential hypertension    Keloid    Open knee wound, left, subsequent encounter       PSHx:  Past Surgical History:   Procedure Laterality Date    HERNIA REPAIR      groin    HIP SURGERY Left     LITHOTRIPSY      VENTRICULOPERITONEAL SHUNT      with revisions        PFHx:  Family History   Problem Relation Age of Onset    Colon Cancer Mother 54        In remission    High Cholesterol Mother     High Blood Pressure Mother     Thyroid Disease Mother         Hypothyroidism    Coronary Art Dis Maternal Grandmother         CABG x 2    Heart Attack Maternal Grandmother     High Blood Pressure Maternal Grandmother     Cancer Maternal Grandmother         Pancreatic Cancer    Heart Failure Maternal Grandfather          in     Immunodeficiency Neg Hx        SocialHx:  Social History     Tobacco Use    Smoking status: Never    Smokeless tobacco: Never   Substance Use Topics    Alcohol use: No       Allergies: Allergies   Allergen Reactions    Latex Swelling and Rash     Had reaction at dentist office-redness, swelling, and rash  Other reaction(s): rash  Had reaction at dentist office-redness, swelling, and rash    Ceftriaxone Other (See Comments)     Other reaction(s): Other (see comments)  Fever, hallucinations was transported via ambulance [address]   Other reaction(s): high fever  Fever, hallucinations was transported via ambulance [place]     Pravastatin      Myalgias    Vancomycin Other (See Comments)     Other reaction(s):  Other (See Comments)  Red warm hands and itching  Red warm hands and itching       Medications:  Current Outpatient Medications   Medication Sig Dispense Refill    mirabegron (MYRBETRIQ) 50 MG TB24 Take 50 mg by mouth daily      nitrofurantoin, macrocrystal-monohydrate, (MACROBID) 100 MG capsule Take 100 mg by mouth daily      Omega-3 Fatty Acids (THE VERY FINEST FISH OIL) LIQD TAKE 3 ML BY MOUTH DAILY 200 mL 5    lisinopril (PRINIVIL;ZESTRIL) 10 MG tablet TAKE 1 TABLET BY MOUTH DAILY FOR BLOOD PRESSURE 90 tablet 0    busPIRone (BUSPAR) 5 MG tablet TAKE 1 TABLET three times a day  AS NEEDED FOR ANXIETY 90 tablet 11    Catheters (SELF-CATH PLUS COUDE TIP) MISC Use as needed  ([address] [place] 16 inch)- self catheterize up to 6 times a day. Needs 200 a month    Dx:Spina Bifida 200 each 5    Catheters MISC CATHETER 6 TIMES DAILY IN & OUT *Q05.9* 180 each 11    D-Mannose POWD 1 Units by Does not apply route 2 times daily      Cranberry 200 MG CAPS Take 1 capsule by mouth daily      menthol-zinc oxide (CALMOSEPTINE) 0.44-20.6 % OINT ointment Apply topically daily Max 30 ml per day. aspirin 81 MG chewable tablet Take 81 mg by mouth daily. No current facility-administered medications for this visit. Objective:   PE:  /84   Pulse (!) 133   Temp 98 °F (36.7 °C)   Ht 4' (1.219 m)   Wt 70 lb 11.2 oz (32.1 kg)   SpO2 97%   BMI 21.57 kg/m²   Physical Exam  Vitals and nursing note reviewed. Exam conducted with a chaperone present (parents). Constitutional:       General: He is not in acute distress. Appearance: He is not ill-appearing. HENT:      Head: Normocephalic. Mouth/Throat:      Mouth: Mucous membranes are moist.      Dentition: Abnormal dentition. Eyes:      Pupils: Pupils are equal, round, and reactive to light. Cardiovascular:      Rate and Rhythm: Regular rhythm. Heart sounds: Murmur heard. Systolic murmur is present with a grade of 2/6.    Pulmonary:      Effort: Pulmonary effort is normal.      Breath sounds: Normal breath sounds. Abdominal:      Palpations: Abdomen is soft. Tenderness: There is no abdominal tenderness. Musculoskeletal:      Cervical back: Normal range of motion. Thoracic back: Deformity present. Lumbar back: Deformity present. Legs:    Skin:     General: Skin is warm and dry. Neurological:      Mental Status: He is alert and oriented to person, place, and time. Mental status is at baseline. Psychiatric:         Attention and Perception: Attention normal.         Mood and Affect: Mood normal.         Behavior: Behavior normal.          Assessment & Plan   Frederick Boeck was seen today for 3 month follow-up. Diagnoses and all orders for this visit:    Mixed hyperlipidemia  -     Lipid Panel; Future    Essential hypertension  -     CBC with Auto Differential; Future  -     TSH; Future  -     Comprehensive Metabolic Panel; Future    History of recurrent UTI (urinary tract infection)    Need for influenza vaccination  -     Influenza, FLUCELVAX, (age 10 mo+), IM, Preservative Free, 0.5 mL    Continue present care and management  Call with new problems  Encouraged to continue low fat diet. Stay well hydrated    Return in about 4 months (around 3/9/2023) for chronic care management, med check. All questions were answered. Medications, including possible adverse effects, and instructions were reviewed and  understanding was confirmed. Follow-up recommendations, including when to contact or return to office (ie; if symptoms worsen or fail to improve), were discussed and acknowledged.     Electronically signed by Vianey Carroll MD on 11/9/22 at 10:18 AM CST

## 2022-11-12 ENCOUNTER — HOSPITAL ENCOUNTER (EMERGENCY)
Age: 37
Discharge: HOME | End: 2022-11-12
Attending: PEDIATRICS
Payer: MEDICARE

## 2022-11-12 VITALS
DIASTOLIC BLOOD PRESSURE: 80 MMHG | RESPIRATION RATE: 20 BRPM | TEMPERATURE: 98.4 F | BODY MASS INDEX: 13.86 KG/M2 | HEIGHT: 60 IN | HEART RATE: 118 BPM | SYSTOLIC BLOOD PRESSURE: 121 MMHG | OXYGEN SATURATION: 95 % | WEIGHT: 70.6 LBS

## 2022-11-12 DIAGNOSIS — N39.0 ACUTE URINARY TRACT INFECTION: Primary | ICD-10-CM

## 2022-11-12 LAB
ALBUMIN SERPL-MCNC: 4.4 G/DL (ref 3.5–5.2)
ALP BLD-CCNC: 91 U/L (ref 40–130)
ALT SERPL-CCNC: 30 U/L (ref 5–41)
ANION GAP SERPL CALCULATED.3IONS-SCNC: 13 MMOL/L (ref 7–19)
AST SERPL-CCNC: 32 U/L (ref 5–40)
BACTERIA: ABNORMAL /HPF
BASOPHILS ABSOLUTE: 0 K/UL (ref 0–0.2)
BASOPHILS RELATIVE PERCENT: 0.2 % (ref 0–1)
BILIRUB SERPL-MCNC: 0.6 MG/DL (ref 0.2–1.2)
BILIRUBIN URINE: NEGATIVE
BLOOD, URINE: ABNORMAL
BUN BLDV-MCNC: 9 MG/DL (ref 6–20)
CALCIUM SERPL-MCNC: 9.4 MG/DL (ref 8.6–10)
CHLORIDE BLD-SCNC: 102 MMOL/L (ref 98–111)
CLARITY: ABNORMAL
CO2: 23 MMOL/L (ref 22–29)
COLOR: YELLOW
CREAT SERPL-MCNC: 0.3 MG/DL (ref 0.5–1.2)
CRYSTALS, UA: ABNORMAL /HPF
EOSINOPHILS ABSOLUTE: 0 K/UL (ref 0–0.6)
EOSINOPHILS RELATIVE PERCENT: 0 % (ref 0–5)
EPITHELIAL CELLS, UA: 0 /HPF (ref 0–5)
GFR SERPL CREATININE-BSD FRML MDRD: >60 ML/MIN/{1.73_M2}
GLUCOSE BLD-MCNC: 116 MG/DL (ref 74–109)
GLUCOSE URINE: NEGATIVE MG/DL
HCT VFR BLD CALC: 44.1 % (ref 42–52)
HEMOGLOBIN: 14.6 G/DL (ref 14–18)
HYALINE CASTS: 5 /HPF (ref 0–8)
IMMATURE GRANULOCYTES #: 0.1 K/UL
KETONES, URINE: NEGATIVE MG/DL
LACTIC ACID: 0.9 MMOL/L (ref 0.5–1.9)
LEUKOCYTE ESTERASE, URINE: ABNORMAL
LYMPHOCYTES ABSOLUTE: 1.1 K/UL (ref 1.1–4.5)
LYMPHOCYTES RELATIVE PERCENT: 4.6 % (ref 20–40)
MCH RBC QN AUTO: 30.8 PG (ref 27–31)
MCHC RBC AUTO-ENTMCNC: 33.1 G/DL (ref 33–37)
MCV RBC AUTO: 93 FL (ref 80–94)
MONOCYTES ABSOLUTE: 1.7 K/UL (ref 0–0.9)
MONOCYTES RELATIVE PERCENT: 7.4 % (ref 0–10)
NEUTROPHILS ABSOLUTE: 20.3 K/UL (ref 1.5–7.5)
NEUTROPHILS RELATIVE PERCENT: 87.4 % (ref 50–65)
NITRITE, URINE: NEGATIVE
PDW BLD-RTO: 11.9 % (ref 11.5–14.5)
PH UA: 6 (ref 5–8)
PLATELET # BLD: 219 K/UL (ref 130–400)
PMV BLD AUTO: 9.7 FL (ref 9.4–12.4)
POTASSIUM SERPL-SCNC: 3.8 MMOL/L (ref 3.5–5)
PROTEIN UA: NEGATIVE MG/DL
RBC # BLD: 4.74 M/UL (ref 4.7–6.1)
RBC UA: 1 /HPF (ref 0–4)
SODIUM BLD-SCNC: 138 MMOL/L (ref 136–145)
SPECIFIC GRAVITY UA: 1.01 (ref 1–1.03)
TOTAL PROTEIN: 7.1 G/DL (ref 6.6–8.7)
UROBILINOGEN, URINE: 1 E.U./DL
WBC # BLD: 23.3 K/UL (ref 4.8–10.8)
WBC UA: 170 /HPF (ref 0–5)

## 2022-11-12 PROCEDURE — 87040 BLOOD CULTURE FOR BACTERIA: CPT

## 2022-11-12 PROCEDURE — 87086 URINE CULTURE/COLONY COUNT: CPT

## 2022-11-12 PROCEDURE — 81001 URINALYSIS AUTO W/SCOPE: CPT

## 2022-11-12 PROCEDURE — 87186 SC STD MICRODIL/AGAR DIL: CPT

## 2022-11-12 PROCEDURE — 80053 COMPREHEN METABOLIC PANEL: CPT

## 2022-11-12 PROCEDURE — 96365 THER/PROPH/DIAG IV INF INIT: CPT

## 2022-11-12 PROCEDURE — 96361 HYDRATE IV INFUSION ADD-ON: CPT

## 2022-11-12 PROCEDURE — 99284 EMERGENCY DEPT VISIT MOD MDM: CPT

## 2022-11-12 PROCEDURE — 36415 COLL VENOUS BLD VENIPUNCTURE: CPT

## 2022-11-12 PROCEDURE — 85025 COMPLETE CBC W/AUTO DIFF WBC: CPT

## 2022-11-12 PROCEDURE — 83605 ASSAY OF LACTIC ACID: CPT

## 2022-11-12 PROCEDURE — 6360000002 HC RX W HCPCS: Performed by: PEDIATRICS

## 2022-11-12 PROCEDURE — 2580000003 HC RX 258: Performed by: PEDIATRICS

## 2022-11-12 RX ORDER — SULFAMETHOXAZOLE AND TRIMETHOPRIM 800; 160 MG/1; MG/1
1 TABLET ORAL 2 TIMES DAILY
Qty: 20 TABLET | Refills: 0 | Status: SHIPPED | OUTPATIENT
Start: 2022-11-12 | End: 2022-11-22

## 2022-11-12 RX ORDER — 0.9 % SODIUM CHLORIDE 0.9 %
500 INTRAVENOUS SOLUTION INTRAVENOUS ONCE
Status: COMPLETED | OUTPATIENT
Start: 2022-11-12 | End: 2022-11-12

## 2022-11-12 RX ADMIN — SODIUM CHLORIDE 500 ML: 9 INJECTION, SOLUTION INTRAVENOUS at 02:56

## 2022-11-12 RX ADMIN — PIPERACILLIN AND TAZOBACTAM 3375 MG: 3; .375 INJECTION, POWDER, FOR SOLUTION INTRAVENOUS at 02:57

## 2022-11-12 ASSESSMENT — ENCOUNTER SYMPTOMS
RHINORRHEA: 0
ABDOMINAL PAIN: 0
COUGH: 0
SHORTNESS OF BREATH: 0
VOMITING: 0
BACK PAIN: 0
EYE DISCHARGE: 0
NAUSEA: 0

## 2022-11-12 NOTE — ED PROVIDER NOTES
[facility]  eMERGENCY dEPARTMENT eNCOUnter      Pt Name: Sheldon Torres  MRN: [id number]  [place] 1985  Date of evaluation: 11/12/2022  Provider: Cherelle Gauthier MD    [address] [place] [address]       Chief Complaint   Patient presents with    Fever     101.4 @ home; took tylenol close to 11pm and temp went down to 100.6    Urinary Tract Infection     Started macrobid 2.5 wks ago (prescribed by urology in [place], [place])         HISTORY OF PRESENT ILLNESS   (Location/Symptom, Timing/Onset,Context/Setting, Quality, Duration, Modifying Factors, Severity)  Note limiting factors. Sheldon Torres is a 40 y.o. male who presents to the emergency department with fever. Patient and parents give history. Patient has a history of spina bifida and recurrent urinary tract infections. Patient self caths 6 times daily to empty his bladder. Patient has been placed on Macrobid on 10/24/2022 once daily to prevent infection. Patient sees urologist in [place], [place]. Patient states that his last urinary tract infection involved Citrobacter but that he has had infections caused by E. coli in the past.  Patient's last 4 cultures have been reviewed per EMR. Organisms do not have a significant resistance pattern at this time. Patient is allergic to ceftriaxone and vancomycin. .  Patient states that fever was 101.4 today orally. Patient took Tylenol at 6 PM today and reduce fever to 98 degrees on arrival.  Patient denies nausea, vomiting, abdominal or pelvic pain, flank pain, or generalized weakness. Patient states that, if possible, he would prefer to be treated at home. Patient notes that he is \"always\" tachycardic when he sees the doctor. HPI    NursingNotes were reviewed. REVIEW OF SYSTEMS    (2-9 systems for level 4, 10 or more for level 5)     Review of Systems   Constitutional:  Negative for chills and fever. HENT:  Negative for congestion and rhinorrhea. Eyes:  Negative for discharge. Respiratory:  Negative for cough and shortness of breath. Cardiovascular:  Negative for chest pain and palpitations. Gastrointestinal:  Negative for abdominal pain, nausea and vomiting. Genitourinary:         Patient self caths daily due to neurogenic bladder. Patient has no sensation regarding bladder or ureter. Musculoskeletal:  Negative for back pain (Denies current pain. Chronic significant scoliosis.) and neck pain. Skin:  Negative for pallor and rash. Neurological:  Positive for weakness (Chronically neurologically impaired secondary to spina bifida, no new weakness. ). Negative for syncope, speech difficulty, light-headedness and headaches. Psychiatric/Behavioral:  Negative for agitation and confusion. All other systems reviewed and are negative. PAST MEDICALHISTORY     Past Medical History:   Diagnosis Date    Anxiety     Chronic kidney disease     kidney stones started at age 5, treated at [facility].   Has had lithotripsy and surgery to remove stones    Club Foot     [facility]en[facility] paraplegia ([facility] [address]     Hypertension     diagnosed at age 6    Leg fracture, left     while in body cast due to swelling    Myelomeningocele with hydrocephalus ([facility] [address] [facility] 1985    Open spina bifida at birth    Neuropathy     Open knee wound, left, subsequent encounter 1/13/2022    Pressure sore on ankle     and knee     Scoliosis     Spastic neurogenic bladder     cather x 5 a day     Ulcer of penis 10/31/2016     (ventriculoperitoneal) shunt status          SURGICAL HISTORY       Past Surgical History:   Procedure Laterality Date    HERNIA REPAIR      groin    HIP SURGERY Left     LITHOTRIPSY      VENTRICULOPERITONEAL SHUNT      with revisions          CURRENT MEDICATIONS     Discharge Medication List as of 11/12/2022  5:02 AM        CONTINUE these medications which have NOT CHANGED    Details   mirabegron (MYRBETRIQ) 50 MG TB24 Take 50 mg by mouth dailyHistorical Med      nitrofurantoin, macrocrystal-monohydrate, (MACROBID) 100 MG capsule Take 100 mg by mouth dailyHistorical Med      Omega-3 Fatty Acids (THE VERY FINEST FISH OIL) LIQD TAKE 3 ML BY MOUTH DAILY, Disp-200 mL, R-5Normal      lisinopril (PRINIVIL;ZESTRIL) 10 MG tablet TAKE 1 TABLET BY MOUTH DAILY FOR BLOOD PRESSURE, Disp-90 tablet, R-0Normal      busPIRone (BUSPAR) 5 MG tablet TAKE 1 TABLET three times a day  AS NEEDED FOR ANXIETY, Disp-90 tablet, R-11$Normal      !! Catheters (SELF-CATH PLUS COUDE TIP) MISC Disp-200 each, R-5, PrintUse as needed  ([address] [place] 16 inch)- self catheterize up to 6 times a day. Needs 200 a month  Dx:Spina Bifida      !! Catheters MISC Disp-180 each, R-11, NormalCATHETER 6 TIMES DAILY IN & OUT *Q05.9*      D-Mannose POWD 1 Units by Does not apply route 2 times dailyHistorical Med      Cranberry 200 MG CAPS Take 1 capsule by mouth dailyHistorical Med      menthol-zinc oxide (CALMOSEPTINE) 0.44-20.6 % OINT ointment Apply topically daily Max 30 ml per day., Topical, DAILY, Historical Med      aspirin 81 MG chewable tablet Take 81 mg by mouth daily. !! - Potential duplicate medications found. Please discuss with provider.           ALLERGIES     Latex, Ceftriaxone, Pravastatin, and Vancomycin    FAMILY HISTORY       Family History   Problem Relation Age of Onset    Colon Cancer Mother 54        In remission    High Cholesterol Mother     High Blood Pressure Mother     Thyroid Disease Mother         Hypothyroidism    Coronary Art Dis Maternal Grandmother         CABG x 2    Heart Attack Maternal Grandmother     High Blood Pressure Maternal Grandmother     Cancer Maternal Grandmother         Pancreatic Cancer    Heart Failure Maternal Grandfather          in     Immunodeficiency Neg Hx           SOCIAL HISTORY       Social History     Socioeconomic History    Marital status: Single   Tobacco Use    Smoking status: Never    Smokeless tobacco: Never   Vaping Use    Vaping Use: Never used   Substance and Sexual Activity    Alcohol use: No    Drug use: No    Sexual activity: Never   Social History Narrative    Adult living with his mother, does his own transfers, sleeps on twin mattress on the floor     Not working, on disability, enjoys painting, goes to [facility] most days     Social Determinants of Health     Financial Resource Strain: Low Risk     Difficulty of Paying Living Expenses: Not hard at all   Food Insecurity: No Food Insecurity    Worried About [address] in the Last Year: Never true    [facility] [place] in the Last Year: Never true       SCREENINGS    [place] Coma Scale  Eye Opening: Spontaneous  Best Verbal Response: Oriented  Best Motor Response: Obeys commands  [place] Coma Scale Score: 15        PHYSICAL EXAM    (up to 7 for level 4, 8 or more for level 5)     ED Triage Vitals [11/12/22 0041]   BP Temp Temp Source Heart Rate Resp SpO2 Height Weight   123/86 98.4 °F (36.9 °C) Oral (!) 138 20 95 % 4' (1.219 m) 70 lb 9.6 oz (32 kg)       Physical Exam  Vitals and nursing note reviewed. Constitutional:       General: He is not in acute distress. Appearance: Normal appearance. Comments: Patient is alert and discusses healthcare without difficulty. Patient is small in stature with history of spina bifida with resulting paraplegia, and obvious significant scoliosis. HENT:      Head: Normocephalic and atraumatic. Right Ear: External ear normal.      Left Ear: External ear normal.      Nose: Nose normal.      Mouth/Throat:      Mouth: Mucous membranes are moist.      Pharynx: Oropharynx is clear. No oropharyngeal exudate. Eyes:      General: No scleral icterus. Conjunctiva/sclera: Conjunctivae normal.      Pupils: Pupils are equal, round, and reactive to light. Cardiovascular:      Rate and Rhythm: Regular rhythm. Tachycardia present. Pulses: Normal pulses. Heart sounds: Normal heart sounds. Comments: Tachycardia in the 130s.   Pulmonary:      Effort: Pulmonary effort is normal. No respiratory distress. Breath sounds: Normal breath sounds. No stridor. No wheezing, rhonchi or rales. Abdominal:      General: Bowel sounds are normal. There is no distension. Palpations: Abdomen is soft. Tenderness: There is no abdominal tenderness. There is no guarding or rebound. Musculoskeletal:         General: No tenderness or deformity. Cervical back: Neck supple. No rigidity. Right lower leg: No edema. Left lower leg: No edema. Skin:     General: Skin is warm and dry. Capillary Refill: Capillary refill takes less than 2 seconds. Coloration: Skin is not jaundiced. Neurological:      General: No focal deficit present. Mental Status: He is alert and oriented to person, place, and time. Mental status is at baseline. Cranial Nerves: No cranial nerve deficit. Sensory: Sensory deficit present. Motor: Weakness present. Coordination: Coordination normal.      Comments: Chronic changes due to spina bifida/paraplegia.    Psychiatric:         Mood and Affect: Mood normal.         Behavior: Behavior normal.       DIAGNOSTIC RESULTS     RADIOLOGY:  Non-plain film images such as CT, Ultrasound and MRI are read by the radiologist. Plain radiographic images are visualized and preliminarily interpreted bythe emergency physician with the below findings:          No orders to display           LABS:  [address] - Abnormal; Notable for the following components:       Result Value    Clarity, UA CLOUDY (*)     Blood, Urine TRACE (*)     Leukocyte Esterase, Urine LARGE (*)     All other components within normal limits   CBC WITH AUTO DIFFERENTIAL - Abnormal; Notable for the following components:    WBC 23.3 (*)     Neutrophils % 87.4 (*)     Lymphocytes % 4.6 (*)     Neutrophils Absolute 20.3 (*)     Monocytes Absolute 1.70 (*)     All other components within normal limits   COMPREHENSIVE METABOLIC PANEL - Abnormal; Notable for the following components:    Glucose 116 (*)     Creatinine 0.3 (*)     All other components within normal limits   MICROSCOPIC URINALYSIS - Abnormal; Notable for the following components:    Bacteria, UA 4+ (*)     Crystals, UA NEG (*)     WBC,  (*)     All other components within normal limits   CULTURE, URINE    Narrative:     ORDER#: [id number]                          ORDERED BY: CARRILLO LO  SOURCE: Urine Clean Catch                  COLLECTED:  11/12/22 01:23  ANTIBIOTICS AT [facility].:                      RECEIVED :  11/12/22 01:28   CULTURE, BLOOD 1   CULTURE, BLOOD 2   LACTIC ACID       All other labs were within normal range or not returned as of this dictation. EMERGENCY DEPARTMENT COURSE and DIFFERENTIAL DIAGNOSIS/MDM:   Vitals:    Vitals:    11/12/22 0041 11/12/22 0100 11/12/22 0500   BP: 123/86 121/80    Pulse: (!) 138  (!) 118   Resp: 20     Temp: 98.4 °F (36.9 °C)     TempSrc: Oral     SpO2: 95%     Weight: 70 lb 9.6 oz (32 kg)     Height: 4' (1.219 m)         MDM     Amount and/or Complexity of Data Reviewed  Clinical lab tests: reviewed  Decide to obtain previous medical records or to obtain history from someone other than the patient: yes    49-year-old male with history of spina bifida/paraplegia presents with fever. Patient has had recurrent urinary tract infections in the past and self caths 6 times daily. Father states that patient's doctor has said that he may be colonized with certain bacteria. Lab results reviewed. Patient has a white count of 23.3. With history of recent fever added to leukocytosis and urinalysis results, it is likely that patient has an acute urinary tract infection at this time. Reflexive urine culture pending. Patient given IV antibiotics and fluids in the emergency department. Patient gives tachycardia in the hospital setting.   Patient is given option of staying in the hospital.  Patient wishes to be discharged home on oral antibiotics. Choice of antibiotic is Bactrim DS for 10 days. Patient has been on Macrobid as preventative and is allergic to ceftriaxone. Patient gives history of amoxicillin causing gastric upset. Culture reviews of Citrobacter and E. coli reveal they have both been sensitive to Bactrim. Patient will return or seek medical attention with abdominal pain, vomiting, increasing or persistent fever, weakness or other concerns. Patient will follow-up with Dr. Berta Hawk, PCP and urologist in [place]. CONSULTS:  None    PROCEDURES:  Unless otherwise noted below, none     Procedures    FINAL IMPRESSION      1.  Acute urinary tract infection          DISPOSITION/PLAN   DISPOSITION Decision To Discharge 11/12/2022 05:20:50 AM      PATIENT REFERRED TO:  Jesusita Kayser, MD  [address] [place] [address] [address] [place] [phone]    Schedule an appointment as soon as possible for a visit         DISCHARGE MEDICATIONS:  Discharge Medication List as of 11/12/2022  5:02 AM        START taking these medications    Details   sulfamethoxazole-trimethoprim (BACTRIM DS) 800-160 MG per tablet Take 1 tablet by mouth 2 times daily for 10 days, Disp-20 tablet, R-0Normal                (Please note that portions of this note were completed with a voice recognition program.  Efforts were made to edit thedictations but occasionally words are mis-transcribed.)    Robin Lester MD (electronically signed)  Attending Emergency Physician          Robin Lester MD  11/12/22 [phone]

## 2022-11-14 LAB
ORGANISM: ABNORMAL
URINE CULTURE, ROUTINE: ABNORMAL
URINE CULTURE, ROUTINE: ABNORMAL

## 2022-11-16 ENCOUNTER — TELEPHONE (OUTPATIENT)
Dept: PRIMARY CARE CLINIC | Age: 37
End: 2022-11-16

## 2022-11-16 NOTE — TELEPHONE ENCOUNTER
He does not need to come in .  We can just send him for another UA and CBC after he completes the abx

## 2022-11-16 NOTE — TELEPHONE ENCOUNTER
Patient went to emergency room over the weekend with another urinary tract infection. He stated he ran fever, and his white blood count came back over 70,000. They prescribed him some bactrim, and his last dose will be Monday. He called and asked if he needed to schedule a follow-up with you or can we enter labs to make sure uti is going away,and to make sure his white blood cell count is going down. Please advise?

## 2022-11-17 LAB
BLOOD CULTURE, ROUTINE: NORMAL
CULTURE, BLOOD 2: NORMAL

## 2022-11-22 ENCOUNTER — TELEPHONE (OUTPATIENT)
Dept: PRIMARY CARE CLINIC | Age: 37
End: 2022-11-22

## 2022-11-22 DIAGNOSIS — N39.0 RECURRENT UTI: Primary | ICD-10-CM

## 2022-11-22 DIAGNOSIS — D72.829 LEUKOCYTOSIS, UNSPECIFIED TYPE: ICD-10-CM

## 2022-11-22 DIAGNOSIS — N39.0 RECURRENT UTI: ICD-10-CM

## 2022-11-22 LAB
BASOPHILS ABSOLUTE: 0 K/UL (ref 0–0.2)
BASOPHILS RELATIVE PERCENT: 0.5 % (ref 0–1)
BILIRUBIN URINE: NEGATIVE
BLOOD, URINE: NEGATIVE
CLARITY: CLEAR
COLOR: YELLOW
EOSINOPHILS ABSOLUTE: 0 K/UL (ref 0–0.6)
EOSINOPHILS RELATIVE PERCENT: 0.7 % (ref 0–5)
GLUCOSE URINE: NEGATIVE MG/DL
HCT VFR BLD CALC: 46 % (ref 42–52)
HEMOGLOBIN: 15.1 G/DL (ref 14–18)
IMMATURE GRANULOCYTES #: 0 K/UL
KETONES, URINE: NEGATIVE MG/DL
LEUKOCYTE ESTERASE, URINE: NEGATIVE
LYMPHOCYTES ABSOLUTE: 2.3 K/UL (ref 1.1–4.5)
LYMPHOCYTES RELATIVE PERCENT: 37.8 % (ref 20–40)
MCH RBC QN AUTO: 31.1 PG (ref 27–31)
MCHC RBC AUTO-ENTMCNC: 32.8 G/DL (ref 33–37)
MCV RBC AUTO: 94.8 FL (ref 80–94)
MONOCYTES ABSOLUTE: 0.7 K/UL (ref 0–0.9)
MONOCYTES RELATIVE PERCENT: 11.1 % (ref 0–10)
NEUTROPHILS ABSOLUTE: 3 K/UL (ref 1.5–7.5)
NEUTROPHILS RELATIVE PERCENT: 49.6 % (ref 50–65)
NITRITE, URINE: NEGATIVE
PDW BLD-RTO: 12.3 % (ref 11.5–14.5)
PH UA: 6.5 (ref 5–8)
PLATELET # BLD: 181 K/UL (ref 130–400)
PMV BLD AUTO: 9.6 FL (ref 9.4–12.4)
PROTEIN UA: NEGATIVE MG/DL
RBC # BLD: 4.85 M/UL (ref 4.7–6.1)
SPECIFIC GRAVITY UA: 1.01 (ref 1–1.03)
UROBILINOGEN, URINE: 1 E.U./DL
WBC # BLD: 6.1 K/UL (ref 4.8–10.8)

## 2022-11-22 NOTE — TELEPHONE ENCOUNTER
Lab called and stated patient was up there to have repeat lab per recent emergency room visit orders placed for patient.

## 2022-12-06 ENCOUNTER — TELEPHONE (OUTPATIENT)
Dept: PRIMARY CARE CLINIC | Age: 37
End: 2022-12-06

## 2022-12-08 DIAGNOSIS — I10 ESSENTIAL HYPERTENSION: ICD-10-CM

## 2022-12-08 RX ORDER — LISINOPRIL 10 MG/1
TABLET ORAL
Qty: 90 TABLET | Refills: 1 | Status: SHIPPED | OUTPATIENT
Start: 2022-12-08

## 2022-12-08 NOTE — TELEPHONE ENCOUNTER
Tao García called to request a refill on his medication.       Last office visit : 11/9/2022   Next office visit : 3/13/2023     Requested Prescriptions     Pending Prescriptions Disp Refills    lisinopril (PRINIVIL;ZESTRIL) 10 MG tablet 90 tablet 1     Sig: TAKE 1 TABLET BY MOUTH DAILY FOR BLOOD PRESSURE            Kristen Lay LPN

## 2023-03-08 DIAGNOSIS — I10 ESSENTIAL HYPERTENSION: ICD-10-CM

## 2023-03-08 DIAGNOSIS — E78.2 MIXED HYPERLIPIDEMIA: ICD-10-CM

## 2023-03-08 LAB
ALBUMIN SERPL-MCNC: 4 G/DL (ref 3.5–5.2)
ALP BLD-CCNC: 98 U/L (ref 40–130)
ALT SERPL-CCNC: 28 U/L (ref 5–41)
ANION GAP SERPL CALCULATED.3IONS-SCNC: 10 MMOL/L (ref 7–19)
AST SERPL-CCNC: 16 U/L (ref 5–40)
BASOPHILS ABSOLUTE: 0 K/UL (ref 0–0.2)
BASOPHILS RELATIVE PERCENT: 0.5 % (ref 0–1)
BILIRUB SERPL-MCNC: 0.4 MG/DL (ref 0.2–1.2)
BUN BLDV-MCNC: 9 MG/DL (ref 6–20)
CALCIUM SERPL-MCNC: 9.3 MG/DL (ref 8.6–10)
CHLORIDE BLD-SCNC: 102 MMOL/L (ref 98–111)
CHOLESTEROL, TOTAL: 152 MG/DL (ref 160–199)
CO2: 27 MMOL/L (ref 22–29)
CREAT SERPL-MCNC: 0.4 MG/DL (ref 0.5–1.2)
EOSINOPHILS ABSOLUTE: 0.1 K/UL (ref 0–0.6)
EOSINOPHILS RELATIVE PERCENT: 1.8 % (ref 0–5)
GFR SERPL CREATININE-BSD FRML MDRD: >60 ML/MIN/{1.73_M2}
GLUCOSE BLD-MCNC: 89 MG/DL (ref 74–109)
HCT VFR BLD CALC: 45.4 % (ref 42–52)
HDLC SERPL-MCNC: 45 MG/DL (ref 55–121)
HEMOGLOBIN: 14.5 G/DL (ref 14–18)
IMMATURE GRANULOCYTES #: 0 K/UL
LDL CHOLESTEROL CALCULATED: 94 MG/DL
LYMPHOCYTES ABSOLUTE: 1.9 K/UL (ref 1.1–4.5)
LYMPHOCYTES RELATIVE PERCENT: 31.3 % (ref 20–40)
MCH RBC QN AUTO: 30.8 PG (ref 27–31)
MCHC RBC AUTO-ENTMCNC: 31.9 G/DL (ref 33–37)
MCV RBC AUTO: 96.4 FL (ref 80–94)
MONOCYTES ABSOLUTE: 0.5 K/UL (ref 0–0.9)
MONOCYTES RELATIVE PERCENT: 8.5 % (ref 0–10)
NEUTROPHILS ABSOLUTE: 3.5 K/UL (ref 1.5–7.5)
NEUTROPHILS RELATIVE PERCENT: 57.7 % (ref 50–65)
PDW BLD-RTO: 11.9 % (ref 11.5–14.5)
PLATELET # BLD: 207 K/UL (ref 130–400)
PMV BLD AUTO: 9.8 FL (ref 9.4–12.4)
POTASSIUM SERPL-SCNC: 4.3 MMOL/L (ref 3.5–5)
RBC # BLD: 4.71 M/UL (ref 4.7–6.1)
SODIUM BLD-SCNC: 139 MMOL/L (ref 136–145)
TOTAL PROTEIN: 6.8 G/DL (ref 6.6–8.7)
TRIGL SERPL-MCNC: 67 MG/DL (ref 0–149)
TSH SERPL DL<=0.05 MIU/L-ACNC: 2.04 UIU/ML (ref 0.27–4.2)
WBC # BLD: 6.1 K/UL (ref 4.8–10.8)

## 2023-03-10 SDOH — ECONOMIC STABILITY: FOOD INSECURITY: WITHIN THE PAST 12 MONTHS, YOU WORRIED THAT YOUR FOOD WOULD RUN OUT BEFORE YOU GOT MONEY TO BUY MORE.: NEVER TRUE

## 2023-03-10 SDOH — ECONOMIC STABILITY: TRANSPORTATION INSECURITY
IN THE PAST 12 MONTHS, HAS LACK OF TRANSPORTATION KEPT YOU FROM MEETINGS, WORK, OR FROM GETTING THINGS NEEDED FOR DAILY LIVING?: NO

## 2023-03-10 SDOH — ECONOMIC STABILITY: FOOD INSECURITY: WITHIN THE PAST 12 MONTHS, THE FOOD YOU BOUGHT JUST DIDN'T LAST AND YOU DIDN'T HAVE MONEY TO GET MORE.: NEVER TRUE

## 2023-03-10 SDOH — ECONOMIC STABILITY: INCOME INSECURITY: HOW HARD IS IT FOR YOU TO PAY FOR THE VERY BASICS LIKE FOOD, HOUSING, MEDICAL CARE, AND HEATING?: NOT HARD AT ALL

## 2023-03-10 SDOH — ECONOMIC STABILITY: HOUSING INSECURITY
IN THE LAST 12 MONTHS, WAS THERE A TIME WHEN YOU DID NOT HAVE A STEADY PLACE TO SLEEP OR SLEPT IN A SHELTER (INCLUDING NOW)?: NO

## 2023-03-13 ENCOUNTER — OFFICE VISIT (OUTPATIENT)
Dept: PRIMARY CARE CLINIC | Age: 38
End: 2023-03-13
Payer: MEDICARE

## 2023-03-13 VITALS
BODY MASS INDEX: 14.14 KG/M2 | SYSTOLIC BLOOD PRESSURE: 128 MMHG | DIASTOLIC BLOOD PRESSURE: 82 MMHG | HEART RATE: 119 BPM | OXYGEN SATURATION: 99 % | TEMPERATURE: 97.2 F | WEIGHT: 72 LBS | HEIGHT: 60 IN

## 2023-03-13 DIAGNOSIS — G80.9: ICD-10-CM

## 2023-03-13 DIAGNOSIS — Q05.9 SPINA BIFIDA, UNSPECIFIED HYDROCEPHALUS PRESENCE, UNSPECIFIED SPINAL REGION (HCC): ICD-10-CM

## 2023-03-13 DIAGNOSIS — Z87.440 HISTORY OF RECURRENT UTIS: ICD-10-CM

## 2023-03-13 DIAGNOSIS — I10 ESSENTIAL HYPERTENSION: ICD-10-CM

## 2023-03-13 DIAGNOSIS — E78.2 MIXED HYPERLIPIDEMIA: ICD-10-CM

## 2023-03-13 DIAGNOSIS — J06.9 VIRAL UPPER RESPIRATORY TRACT INFECTION: Primary | ICD-10-CM

## 2023-03-13 PROCEDURE — 3074F SYST BP LT 130 MM HG: CPT | Performed by: FAMILY MEDICINE

## 2023-03-13 PROCEDURE — G8420 CALC BMI NORM PARAMETERS: HCPCS | Performed by: FAMILY MEDICINE

## 2023-03-13 PROCEDURE — 99214 OFFICE O/P EST MOD 30 MIN: CPT | Performed by: FAMILY MEDICINE

## 2023-03-13 PROCEDURE — G8482 FLU IMMUNIZE ORDER/ADMIN: HCPCS | Performed by: FAMILY MEDICINE

## 2023-03-13 PROCEDURE — 1036F TOBACCO NON-USER: CPT | Performed by: FAMILY MEDICINE

## 2023-03-13 PROCEDURE — 3079F DIAST BP 80-89 MM HG: CPT | Performed by: FAMILY MEDICINE

## 2023-03-13 PROCEDURE — G8427 DOCREV CUR MEDS BY ELIG CLIN: HCPCS | Performed by: FAMILY MEDICINE

## 2023-03-13 RX ORDER — NITROFURANTOIN 25; 75 MG/1; MG/1
CAPSULE ORAL
COMMUNITY
Start: 2023-02-28

## 2023-03-13 ASSESSMENT — PATIENT HEALTH QUESTIONNAIRE - PHQ9
SUM OF ALL RESPONSES TO PHQ QUESTIONS 1-9: 0
SUM OF ALL RESPONSES TO PHQ9 QUESTIONS 1 & 2: 0
SUM OF ALL RESPONSES TO PHQ QUESTIONS 1-9: 0
2. FEELING DOWN, DEPRESSED OR HOPELESS: 0
1. LITTLE INTEREST OR PLEASURE IN DOING THINGS: 0

## 2023-03-13 ASSESSMENT — ENCOUNTER SYMPTOMS
GASTROINTESTINAL NEGATIVE: 1
NAUSEA: 0
EYES NEGATIVE: 1
COUGH: 1
VOMITING: 0

## 2023-03-13 NOTE — PROGRESS NOTES
[address] [facility]  [address] [facility] [address]  [address] [address]  Dept: [phone]  Dept Fax: [phone]  Loc: [phone]      Subjective:     Chief Complaint   Patient presents with    Follow-up     4 month        HPI:  Jacqueline Marcus is a 40 y.o. male presenting for routine follow-up and med check. He had recent blood work and they all came back within normal limits. His cholesterol is now at goal. No unpleasant s/e from meds reported  BP looks good today as well. Chronic meds reviewed -no change reported. He has not had a bladder infection since he was placed on prophylactic abx. He has a mild cold today but states it feels much better today. ROS:   Review of Systems   Constitutional: Negative. Negative for chills and fever. Activity change:  no change, pt is wheelchair bound. HENT:  Positive for congestion (mild). Eyes: Negative. Respiratory:  Positive for cough (mild). Cardiovascular: Negative. Gastrointestinal: Negative. Negative for nausea and vomiting. Endocrine: Negative. Genitourinary:  Negative for dysuria, flank pain and penile discharge. Pt self catheterize 6 times a day as needed   Neurological: Negative. Hematological: Negative. Psychiatric/Behavioral:  The patient is not nervous/anxious. PMHx:  Past Medical History:   Diagnosis Date    Anxiety     Chronic kidney disease     kidney stones started at age 5, treated at [address] [place] [address].   Has had lithotripsy and surgery to remove stones    Club Foot     [facility]en[facility] paraplegia ([facility] [address]     Hypertension     diagnosed at age 6    Kidney stones 12/1/2011    Leg fracture, left     while in body cast due to swelling    Myelomeningocele with hydrocephalus ([address] 1985    Open spina bifida at birth    Neuropathy     Open knee wound, left, subsequent encounter 1/13/2022    Pressure sore on ankle     and knee     Scoliosis     Spastic neurogenic bladder     cather x 5 a day     Ulcer of penis 10/31/2016     (ventriculoperitoneal) shunt status      Patient Active Problem List   Diagnosis    Spina bifida ([address]    Family history of malignant melanoma of skin    Catheter-associated urinary tract infection ([address]    Myelomeningocele with hydrocephalus ([address]    Congenital flaccid paralysis ([address]    Panic attack    Anxiety    Recurrent UTI    Essential hypertension    Keloid    Open knee wound, left, subsequent encounter       PSHx:  Past Surgical History:   Procedure Laterality Date    HERNIA REPAIR      groin    HIP SURGERY Left     LITHOTRIPSY      VENTRICULOPERITONEAL SHUNT      with revisions        PFHx:  Family History   Problem Relation Age of Onset    Colon Cancer Mother 54        In remission    High Cholesterol Mother     High Blood Pressure Mother     Thyroid Disease Mother         Hypothyroidism    Coronary Art Dis Maternal Grandmother         CABG x 2    Heart Attack Maternal Grandmother     High Blood Pressure Maternal Grandmother     Cancer Maternal Grandmother         Pancreatic Cancer    Heart Failure Maternal Grandfather          in     Immunodeficiency Neg Hx        SocialHx:  Social History     Tobacco Use    Smoking status: Never    Smokeless tobacco: Never   Substance Use Topics    Alcohol use: No       Allergies: Allergies   Allergen Reactions    Latex Swelling and Rash     Had reaction at dentist office-redness, swelling, and rash  Other reaction(s): rash  Had reaction at dentist office-redness, swelling, and rash    Ceftriaxone Other (See Comments)     Other reaction(s): Other (see comments)  Fever, hallucinations was transported via ambulance [address]   Other reaction(s): high fever  Fever, hallucinations was transported via ambulance [place]     Pravastatin      Myalgias    Vancomycin Other (See Comments)     Other reaction(s):  Other (See Comments)  Red warm hands and itching  Red warm hands and itching       Medications:  Current Outpatient Medications   Medication Sig Dispense Refill    nitrofurantoin, macrocrystal-monohydrate, (MACROBID) 100 MG capsule TAKE 1 CAPSULE (100 MG TOTAL) BY MOUTH DAILY. lisinopril (PRINIVIL;ZESTRIL) 10 MG tablet TAKE 1 TABLET BY MOUTH DAILY FOR BLOOD PRESSURE 90 tablet 1    mirabegron (MYRBETRIQ) 50 MG TB24 Take 50 mg by mouth daily      Omega-3 Fatty Acids (THE VERY FINEST FISH OIL) LIQD TAKE 3 ML BY MOUTH DAILY 200 mL 5    busPIRone (BUSPAR) 5 MG tablet TAKE 1 TABLET three times a day  AS NEEDED FOR ANXIETY 90 tablet 11    Catheters (SELF-CATH PLUS COUDE TIP) MISC Use as needed  ([address] [place] 16 inch)- self catheterize up to 6 times a day. Needs 200 a month    Dx:Spina Bifida 200 each 5    Catheters MISC CATHETER 6 TIMES DAILY IN & OUT *Q05.9* 180 each 11    D-Mannose POWD 1 Units by Does not apply route 2 times daily      Cranberry 200 MG CAPS Take 1 capsule by mouth daily      menthol-zinc oxide (CALMOSEPTINE) 0.44-20.6 % OINT ointment Apply topically daily Max 30 ml per day. aspirin 81 MG chewable tablet Take 81 mg by mouth daily. No current facility-administered medications for this visit. Objective:   PE:  /82   Pulse (!) 119   Temp 97.2 °F (36.2 °C) (Temporal)   Ht 4' (1.219 m)   Wt 72 lb (32.7 kg)   SpO2 99%   BMI 21.97 kg/m²   Physical Exam  Vitals and nursing note reviewed. Exam conducted with a chaperone present (mother). Constitutional:       General: He is not in acute distress. Appearance: He is not ill-appearing. HENT:      Head: Normocephalic. Right Ear: Tympanic membrane, ear canal and external ear normal.      Left Ear: Tympanic membrane, ear canal and external ear normal.      Nose: Congestion (mild) and rhinorrhea (mild) present. Mouth/Throat:      Mouth: Mucous membranes are moist.      Dentition: Abnormal dentition. Comments: poor dentition  Eyes:      General: No scleral icterus.      Conjunctiva/sclera: Conjunctivae normal.      Pupils: Pupils are equal, round, and reactive to light. Cardiovascular:      Rate and Rhythm: Regular rhythm. Heart sounds: Murmur heard. Systolic murmur is present with a grade of 2/6. Pulmonary:      Effort: Pulmonary effort is normal.      Breath sounds: Normal breath sounds. Abdominal:      Palpations: Abdomen is soft. Tenderness: There is no abdominal tenderness. Musculoskeletal:      Cervical back: Neck supple. Thoracic back: Deformity present. Lumbar back: Deformity present. Legs:       Comments: +scoliosis   Skin:     General: Skin is warm and dry. Neurological:      Mental Status: He is alert and oriented to person, place, and time. Mental status is at baseline. Psychiatric:         Attention and Perception: Attention normal.         Mood and Affect: Mood normal.         Behavior: Behavior normal.         Thought Content: Thought content normal.         Judgment: Judgment normal.          Assessment & Plan   Ga Mujica was seen today for follow-up. Diagnoses and all orders for this visit:    Viral upper respiratory tract infection    Essential hypertension    Mixed hyperlipidemia    Spina bifida, unspecified hydrocephalus presence, unspecified spinal region [facility])    Congenital flaccid paralysis ([address]    History of recurrent UTIs    Continue present care and management  Continue all maintenance medications  Encouraged to continue healthy lifestyle change  Stay well  hydrated - drink at least 64 oz of fluid a day  Eat 6 servings of fruit and vegetables daily  Keep scheduled follow-up appt with me and other providers/specialists  Call with new concerns   Return in about 6 months (around 9/13/2023) for chronic care management. All questions were answered. Medications, including possible adverse effects, and instructions were reviewed and  understanding was confirmed.    Follow-up recommendations, including when to contact or return to office (ie; if symptoms worsen or fail to improve), were discussed and acknowledged.     Electronically signed by Denilson Green MD on 3/13/23 at 9:00 AM CDT

## 2023-03-15 ENCOUNTER — TELEPHONE (OUTPATIENT)
Dept: PRIMARY CARE CLINIC | Age: 38
End: 2023-03-15

## 2023-03-15 NOTE — TELEPHONE ENCOUNTER
----- Message from Peggy Zavaleta MD sent at 3/8/2023  3:03 PM CST -----  Cholesterol is within normal limits

## 2023-03-25 ENCOUNTER — OFFICE VISIT (OUTPATIENT)
Age: 38
End: 2023-03-25

## 2023-03-25 VITALS
HEIGHT: 60 IN | DIASTOLIC BLOOD PRESSURE: 70 MMHG | SYSTOLIC BLOOD PRESSURE: 110 MMHG | OXYGEN SATURATION: 98 % | BODY MASS INDEX: 14.14 KG/M2 | RESPIRATION RATE: 19 BRPM | HEART RATE: 120 BPM | TEMPERATURE: 98.1 F | WEIGHT: 72 LBS

## 2023-03-25 DIAGNOSIS — R82.90 CLOUDY URINE: Primary | ICD-10-CM

## 2023-03-25 LAB
APPEARANCE FLUID: CLEAR
BILIRUBIN, POC: NEGATIVE
BLOOD URINE, POC: ABNORMAL
CLARITY, POC: CLEAR
COLOR, POC: YELLOW
GLUCOSE URINE, POC: NEGATIVE
KETONES, POC: NEGATIVE
LEUKOCYTE EST, POC: ABNORMAL
NITRITE, POC: NEGATIVE
PH, POC: 7
PROTEIN, POC: NEGATIVE
SPECIFIC GRAVITY, POC: 1.01
UROBILINOGEN, POC: ABNORMAL

## 2023-03-25 NOTE — PROGRESS NOTES
and Affect: Mood normal.         Behavior: Behavior normal.         Thought Content: Thought content normal.         Judgment: Judgment normal.     Results for orders placed or performed in visit on 03/25/23   POCT Urinalysis no Micro   Result Value Ref Range    Color, UA yellow     Clarity, UA clear     Glucose, UA POC negative     Bilirubin, UA negative     Ketones, UA negative     Spec Grav, UA 1.010     Blood, UA POC trace-lysed     pH, UA 7.0     Protein, UA POC negative     Urobilinogen, UA 1.0 E.U./dL     Leukocytes, UA small     Nitrite, UA negative     Appearance, Fluid Clear Clear, Slightly Cloudy       Assessment:       Diagnosis Orders   1. Dysuria  POCT Urinalysis no Micro    Culture, Urine              Plan:      His UA only showed a small amount of leukocytes and trace of blood. I will send his urine for culture. We will decide if he needs abx after that.           José Manuel Lopez PA-C

## 2023-03-26 LAB — BACTERIA UR CULT: NO GROWTH

## 2023-03-27 LAB — BACTERIA UR CULT: NORMAL

## 2023-04-20 ENCOUNTER — TELEPHONE (OUTPATIENT)
Dept: PRIMARY CARE CLINIC | Age: 38
End: 2023-04-20

## 2023-04-21 NOTE — TELEPHONE ENCOUNTER
They were already signed and faxed, I faxed them again yesterday and got confirmation on both of them. I called patient and informed him of the both dates I faxed them and let him know they should have it.

## 2023-06-30 ENCOUNTER — TRANSCRIBE ORDERS (OUTPATIENT)
Dept: ADMINISTRATIVE | Age: 38
End: 2023-06-30

## 2023-06-30 DIAGNOSIS — N31.9 NEUROGENIC BLADDER DISORDER: Primary | ICD-10-CM

## 2023-07-05 ENCOUNTER — HOSPITAL ENCOUNTER (OUTPATIENT)
Dept: ULTRASOUND IMAGING | Age: 38
Discharge: HOME | End: 2023-07-05
Payer: MEDICARE

## 2023-07-05 DIAGNOSIS — N31.9 NEUROGENIC BLADDER DISORDER: ICD-10-CM

## 2023-07-05 PROCEDURE — 76770 US EXAM ABDO BACK WALL COMP: CPT

## 2023-08-30 DIAGNOSIS — N39.0 RECURRENT UTI: Primary | ICD-10-CM

## 2023-08-30 RX ORDER — NITROFURANTOIN 25; 75 MG/1; MG/1
100 CAPSULE ORAL 2 TIMES DAILY
Qty: 20 CAPSULE | Refills: 0 | Status: SHIPPED | OUTPATIENT
Start: 2023-08-30 | End: 2023-09-09

## 2023-08-30 NOTE — TELEPHONE ENCOUNTER
Libby Griggs called to request a refill on his medication. Last office visit : 6/16/2023   Next office visit : 9/18/2023     Requested Prescriptions     Pending Prescriptions Disp Refills    nitrofurantoin, macrocrystal-monohydrate, (MACROBID) 100 MG capsule 20 capsule 0     Sig: Take 1 capsule by mouth 2 times daily for 10 days     Patients mother called and stated his urine is cloudy and unable to see through it. She stated she was diagnosed covid and unable to bring him in for a urine sample. Patient has a diagnosis of frquent UTI'S, and take same medication at night.    Pended medication for approval       Mika Schmid MA

## 2023-08-31 ENCOUNTER — TELEPHONE (OUTPATIENT)
Dept: PRIMARY CARE CLINIC | Age: 38
End: 2023-08-31

## 2023-08-31 NOTE — TELEPHONE ENCOUNTER
----- Message from Shelley Jackie sent at 8/31/2023  1:54 PM CDT -----  Subject: Medication Problem    Medication: nitrofurantoin, macrocrystal-monohydrate, (MACROBID) 100 MG   capsule  Dosage: 1 in AM and 1 in PM  Ordering Provider: Henna Bahena    Question/Problem: Mother states that the patient has been drinking a lot   of water since taking the medication in the morning and the evening. The   last check in he had at 11 am his urine was cloudy but not a dark brown.    She would like to know if this is the right medication and if maybe he   could be put on Bactrim for 10 days which has helped him in the past?      Pharmacy: [facility], [address] [phone]    ---------------------------------------------------------------------------  --------------  [address]  [phone]; OK to leave message on voicemail  ---------------------------------------------------------------------------  --------------    SCRIPT ANSWERS  Relationship to Patient: Parent  Representative Name: Evangelist chowdhury   Patient is under 25 and the Parent has custody: No  Is the representative on the Communication Release of Information (NOEMI)   form in Epic: Yes

## 2023-09-05 ENCOUNTER — TELEPHONE (OUTPATIENT)
Dept: PRIMARY CARE CLINIC | Age: 38
End: 2023-09-05

## 2023-09-05 NOTE — TELEPHONE ENCOUNTER
Reported over the weekend one urine would be dark and could not see and one would be clear. Patient has been taking the macrobid twice daily and he reported to his mom that his urine was clear today.  Patient has as scheduled appointment tomorrow

## 2023-09-06 ENCOUNTER — OFFICE VISIT (OUTPATIENT)
Dept: PRIMARY CARE CLINIC | Age: 38
End: 2023-09-06
Payer: MEDICARE

## 2023-09-06 VITALS
BODY MASS INDEX: 13.35 KG/M2 | WEIGHT: 68 LBS | HEIGHT: 60 IN | SYSTOLIC BLOOD PRESSURE: 124 MMHG | TEMPERATURE: 97.6 F | OXYGEN SATURATION: 98 % | HEART RATE: 102 BPM | DIASTOLIC BLOOD PRESSURE: 88 MMHG

## 2023-09-06 DIAGNOSIS — R82.90 CLOUDY URINE: ICD-10-CM

## 2023-09-06 DIAGNOSIS — N31.8 SPASTIC NEUROGENIC BLADDER: ICD-10-CM

## 2023-09-06 DIAGNOSIS — M62.838 NECK MUSCLE SPASM: ICD-10-CM

## 2023-09-06 DIAGNOSIS — Q05.9 SPINA BIFIDA, UNSPECIFIED HYDROCEPHALUS PRESENCE, UNSPECIFIED SPINAL REGION (HCC): ICD-10-CM

## 2023-09-06 DIAGNOSIS — R82.90 CLOUDY URINE: Primary | ICD-10-CM

## 2023-09-06 LAB
APPEARANCE FLUID: ABNORMAL
BILIRUBIN, POC: NEGATIVE
BLOOD URINE, POC: ABNORMAL
CLARITY, POC: ABNORMAL
COLOR, POC: YELLOW
GLUCOSE URINE, POC: NEGATIVE
KETONES, POC: NEGATIVE
LEUKOCYTE EST, POC: ABNORMAL
NITRITE, POC: NEGATIVE
PH, POC: 7.5
PROTEIN, POC: NEGATIVE
SPECIFIC GRAVITY, POC: 1.01
UROBILINOGEN, POC: 0.2

## 2023-09-06 PROCEDURE — 99214 OFFICE O/P EST MOD 30 MIN: CPT | Performed by: FAMILY MEDICINE

## 2023-09-06 PROCEDURE — 3074F SYST BP LT 130 MM HG: CPT | Performed by: FAMILY MEDICINE

## 2023-09-06 PROCEDURE — 3079F DIAST BP 80-89 MM HG: CPT | Performed by: FAMILY MEDICINE

## 2023-09-06 PROCEDURE — 1036F TOBACCO NON-USER: CPT | Performed by: FAMILY MEDICINE

## 2023-09-06 PROCEDURE — 81002 URINALYSIS NONAUTO W/O SCOPE: CPT | Performed by: FAMILY MEDICINE

## 2023-09-06 PROCEDURE — G8427 DOCREV CUR MEDS BY ELIG CLIN: HCPCS | Performed by: FAMILY MEDICINE

## 2023-09-06 PROCEDURE — G8420 CALC BMI NORM PARAMETERS: HCPCS | Performed by: FAMILY MEDICINE

## 2023-09-06 RX ORDER — CYCLOBENZAPRINE HCL 5 MG
5 TABLET ORAL DAILY
Qty: 10 TABLET | Refills: 0 | Status: SHIPPED | OUTPATIENT
Start: 2023-09-06 | End: 2023-09-16

## 2023-09-06 ASSESSMENT — ENCOUNTER SYMPTOMS
NAUSEA: 0
EYES NEGATIVE: 1
RESPIRATORY NEGATIVE: 1
GASTROINTESTINAL NEGATIVE: 1
VOMITING: 0

## 2023-09-06 NOTE — PROGRESS NOTES
are moist.      Dentition: Abnormal dentition. Eyes:      Pupils: Pupils are equal, round, and reactive to light. Cardiovascular:      Rate and Rhythm: Regular rhythm. Heart sounds: Murmur heard. Systolic murmur is present with a grade of 2/6. Pulmonary:      Effort: Pulmonary effort is normal.      Breath sounds: Normal breath sounds. Abdominal:      Palpations: Abdomen is soft. Tenderness: There is no abdominal tenderness. Musculoskeletal:      Cervical back: Normal range of motion. Thoracic back: Deformity present. Lumbar back: Deformity present. Legs:    Skin:     General: Skin is warm and dry. Neurological:      Mental Status: He is alert and oriented to person, place, and time. Mental status is at baseline. Psychiatric:         Attention and Perception: Attention normal.         Mood and Affect: Mood normal.         Behavior: Behavior normal.          Assessment & Plan   Gama Yepez was seen today for back pain and dysuria. Diagnoses and all orders for this visit:    Cloudy urine  -     Culture, Urine; Future  -     POCT Urinalysis no Micro    Neck muscle spasm  -     cyclobenzaprine (FLEXERIL) 5 MG tablet; Take 1 tablet by mouth Daily for 10 days    Spastic neurogenic bladder    Spina bifida, unspecified hydrocephalus presence, unspecified spinal region [facility])    Continue present care and management  Continue present maintenance medication  Cautioned on possible s/e of muscle relaxeer  May use warm compress prn  Continue self cath care  Call with new concerns    Return for follow up as needed. All questions were answered. Medications, including possible adverse effects, and instructions were reviewed and  understanding was confirmed. Follow-up recommendations, including when to contact or return to office (ie; if symptoms worsen or fail to improve), were discussed and acknowledged.     Electronically signed by Sergey Javed MD on 9/6/23 at 12:08 PM CDT

## 2023-09-08 ENCOUNTER — TELEPHONE (OUTPATIENT)
Dept: PRIMARY CARE CLINIC | Age: 38
End: 2023-09-08

## 2023-09-08 DIAGNOSIS — N30.00 ACUTE CYSTITIS WITHOUT HEMATURIA: Primary | ICD-10-CM

## 2023-09-08 LAB
BACTERIA UR CULT: ABNORMAL
BACTERIA UR CULT: ABNORMAL
ORGANISM: ABNORMAL

## 2023-09-08 RX ORDER — LEVOFLOXACIN 500 MG/1
500 TABLET, FILM COATED ORAL DAILY
Qty: 7 TABLET | Refills: 0 | Status: SHIPPED | OUTPATIENT
Start: 2023-09-08 | End: 2023-09-15

## 2023-09-08 NOTE — TELEPHONE ENCOUNTER
Petar Reddy, friend of patient called stating that Rika Silva was in the office the day before with a UTI and has been prescribed to take Nitrofurantoin BID. He said that his urine was cloudy and was sent for a culture. The culture grew e-coli and nothing was said about taking any meds. He wanted to know should he have another medication for the e-coli or do anything differently. If something is called in he wants it sent to [id number] [facility]. Patient friend wants a call back from the provider on this matter. @ [phone]

## 2023-09-08 NOTE — TELEPHONE ENCOUNTER
Please call and let patient and friend (if hippa compliant) to stop macrobid/nitrofurantoin and to start levaquin that has been sent to [facility]. Make sure to stay hydrated.

## 2023-11-13 DIAGNOSIS — I10 ESSENTIAL HYPERTENSION: ICD-10-CM

## 2023-11-13 RX ORDER — LISINOPRIL 10 MG/1
TABLET ORAL
Qty: 30 TABLET | Refills: 5 | OUTPATIENT
Start: 2023-11-13

## 2023-11-13 NOTE — TELEPHONE ENCOUNTER
Huma Barragan called to request a refill on his medication.       Last office visit : 9/6/2023   Next office visit : 3/14/2024     Requested Prescriptions     Pending Prescriptions Disp Refills    lisinopril (PRINIVIL;ZESTRIL) 10 MG tablet [Pharmacy Med Name: LISINOPRIL 10MG TABLET] 30 tablet 5     Sig: TAKE 1 TABLET BY MOUTH DAILY FOR BLOOD PRESSURE            Marie Rodrigez LPN

## 2023-12-06 ENCOUNTER — OFFICE VISIT (OUTPATIENT)
Dept: PRIMARY CARE CLINIC | Age: 38
End: 2023-12-06

## 2023-12-06 VITALS
SYSTOLIC BLOOD PRESSURE: 118 MMHG | BODY MASS INDEX: 13.35 KG/M2 | HEIGHT: 60 IN | TEMPERATURE: 98.4 F | DIASTOLIC BLOOD PRESSURE: 70 MMHG | HEART RATE: 111 BPM | OXYGEN SATURATION: 97 % | WEIGHT: 68 LBS

## 2023-12-06 DIAGNOSIS — N30.00 ACUTE CYSTITIS WITHOUT HEMATURIA: ICD-10-CM

## 2023-12-06 DIAGNOSIS — R50.9 FEVER, UNSPECIFIED FEVER CAUSE: ICD-10-CM

## 2023-12-06 DIAGNOSIS — R82.90 CLOUDY URINE: Primary | ICD-10-CM

## 2023-12-06 LAB
APPEARANCE FLUID: ABNORMAL
BILIRUBIN, POC: NEGATIVE
BLOOD URINE, POC: ABNORMAL
CLARITY, POC: ABNORMAL
COLOR, POC: ABNORMAL
GLUCOSE URINE, POC: NEGATIVE
KETONES, POC: 15
LEUKOCYTE EST, POC: ABNORMAL
NITRITE, POC: NEGATIVE
PH, POC: 7
PROTEIN, POC: NEGATIVE
SPECIFIC GRAVITY, POC: 1.02
UROBILINOGEN, POC: 1

## 2023-12-06 RX ORDER — CIPROFLOXACIN 250 MG/1
250 TABLET, FILM COATED ORAL 2 TIMES DAILY
Qty: 14 TABLET | Refills: 0 | Status: SHIPPED | OUTPATIENT
Start: 2023-12-06 | End: 2023-12-13

## 2023-12-06 NOTE — PROGRESS NOTES
[address] [facility]  [facility],[address] [place] [address]  Dept: [phone]  Dept Fax: [phone]  Loc: [phone]      Subjective:     Chief Complaint   Patient presents with    Frequent/Recurrent UTI       HPI:  Agatha Baer is a 45 y.o. male presents today with fever up to 102 this am. His urine is noted to be cloudy. UA today shows + leucocyte      ROS:   Review of Systems    PMHx:  Past Medical History:   Diagnosis Date    Anxiety     Chronic kidney disease     kidney stones started at age 5, treated at [facility].   Has had lithotripsy and surgery to remove stones    Club Foot     Congenital paraplegia ([address])     Hypertension     diagnosed at age 6    Kidney stones 12/1/2011    Leg fracture, left     while in body cast due to swelling    Myelomeningocele with hydrocephalus ([address]) 1985    Open spina bifida at birth    Neuropathy     Open knee wound, left, subsequent encounter 1/13/2022    Pressure sore on ankle     and knee     Scoliosis     Spastic neurogenic bladder     cather x 5 a day     Ulcer of penis 10/31/2016     (ventriculoperitoneal) shunt status      Patient Active Problem List   Diagnosis    Spina bifida ([address])    Family history of malignant melanoma of skin    Catheter-associated urinary tract infection ([address])    Myelomeningocele with hydrocephalus ([address])    Congenital flaccid paralysis ([address])    Panic attack    Anxiety    Recurrent UTI    Essential hypertension    Keloid    Open knee wound, left, subsequent encounter    Spastic neurogenic bladder       PSHx:  Past Surgical History:   Procedure Laterality Date    HERNIA REPAIR      groin    HIP SURGERY Left     LITHOTRIPSY      VENTRICULOPERITONEAL SHUNT      with revisions        PFHx:  Family History   Problem Relation Age of Onset    Colon Cancer Mother 54        In remission    High Cholesterol Mother     High Blood Pressure Mother     Thyroid Disease Mother

## 2023-12-08 LAB
BACTERIA UR CULT: ABNORMAL
BACTERIA UR CULT: ABNORMAL
ORGANISM: ABNORMAL

## 2023-12-10 DIAGNOSIS — I10 ESSENTIAL HYPERTENSION: ICD-10-CM

## 2023-12-11 RX ORDER — LISINOPRIL 10 MG/1
TABLET ORAL
Qty: 90 TABLET | Refills: 0 | Status: SHIPPED | OUTPATIENT
Start: 2023-12-11

## 2023-12-11 NOTE — TELEPHONE ENCOUNTER
Jaison Nash called to request a refill on his medication.       Last office visit : 12/6/2023   Next office visit : 3/14/2024     Requested Prescriptions     Pending Prescriptions Disp Refills    lisinopril (PRINIVIL;ZESTRIL) 10 MG tablet 90 tablet 1     Sig: TAKE 1 TABLET BY MOUTH DAILY FOR BLOOD PRESSURE            Lizette Patel LPN

## 2023-12-12 ENCOUNTER — TELEPHONE (OUTPATIENT)
Dept: PRIMARY CARE CLINIC | Age: 38
End: 2023-12-12

## 2023-12-12 NOTE — TELEPHONE ENCOUNTER
----- Message from Brittany Ewing MD sent at 12/11/2023  5:12 PM CST -----  Urine culture + E coli, continue present abx

## 2024-01-08 RX ORDER — OMEGA-3/DHA/EPA/FISH OIL 1600MG/5ML
LIQUID (ML) ORAL
Qty: 200 ML | Refills: 5 | Status: SHIPPED | OUTPATIENT
Start: 2024-01-08

## 2024-01-29 DIAGNOSIS — M62.838 NECK MUSCLE SPASM: ICD-10-CM

## 2024-01-29 RX ORDER — CYCLOBENZAPRINE HCL 5 MG
5 TABLET ORAL DAILY
Qty: 10 TABLET | Refills: 0 | Status: SHIPPED | OUTPATIENT
Start: 2024-01-29 | End: 2024-02-08

## 2024-01-29 NOTE — TELEPHONE ENCOUNTER
Kevin E Thompson called to request a refill on his medication.      Last office visit : 12/6/2023   Next office visit : 3/14/2024     Requested Prescriptions     Pending Prescriptions Disp Refills    cyclobenzaprine (FLEXERIL) 5 MG tablet 10 tablet 0     Sig: Take 1 tablet by mouth Daily for 10 days       Patient is requesting refill on this medication due to muscle spasm in neck.      Autumn Brand MA

## 2024-01-30 ENCOUNTER — OFFICE VISIT (OUTPATIENT)
Dept: PRIMARY CARE CLINIC | Age: 39
End: 2024-01-30
Payer: MEDICARE

## 2024-01-30 VITALS
OXYGEN SATURATION: 98 % | SYSTOLIC BLOOD PRESSURE: 144 MMHG | DIASTOLIC BLOOD PRESSURE: 106 MMHG | BODY MASS INDEX: 13.35 KG/M2 | WEIGHT: 68 LBS | HEART RATE: 133 BPM | TEMPERATURE: 98.2 F | HEIGHT: 60 IN

## 2024-01-30 DIAGNOSIS — F41.1 GENERALIZED ANXIETY DISORDER WITH PANIC ATTACKS: ICD-10-CM

## 2024-01-30 DIAGNOSIS — F41.0 GENERALIZED ANXIETY DISORDER WITH PANIC ATTACKS: ICD-10-CM

## 2024-01-30 DIAGNOSIS — G80.9: ICD-10-CM

## 2024-01-30 DIAGNOSIS — I10 ESSENTIAL HYPERTENSION: ICD-10-CM

## 2024-01-30 DIAGNOSIS — R00.0 TACHYCARDIA: Primary | ICD-10-CM

## 2024-01-30 PROCEDURE — G8420 CALC BMI NORM PARAMETERS: HCPCS | Performed by: FAMILY MEDICINE

## 2024-01-30 PROCEDURE — G8484 FLU IMMUNIZE NO ADMIN: HCPCS | Performed by: FAMILY MEDICINE

## 2024-01-30 PROCEDURE — 3080F DIAST BP >= 90 MM HG: CPT | Performed by: FAMILY MEDICINE

## 2024-01-30 PROCEDURE — G8427 DOCREV CUR MEDS BY ELIG CLIN: HCPCS | Performed by: FAMILY MEDICINE

## 2024-01-30 PROCEDURE — 99213 OFFICE O/P EST LOW 20 MIN: CPT | Performed by: FAMILY MEDICINE

## 2024-01-30 PROCEDURE — 1036F TOBACCO NON-USER: CPT | Performed by: FAMILY MEDICINE

## 2024-01-30 PROCEDURE — 3077F SYST BP >= 140 MM HG: CPT | Performed by: FAMILY MEDICINE

## 2024-01-30 RX ORDER — PROPRANOLOL HYDROCHLORIDE 20 MG/1
20 TABLET ORAL 3 TIMES DAILY
Qty: 30 TABLET | Refills: 1 | Status: SHIPPED | OUTPATIENT
Start: 2024-01-30 | End: 2024-01-31

## 2024-01-30 ASSESSMENT — PATIENT HEALTH QUESTIONNAIRE - PHQ9
SUM OF ALL RESPONSES TO PHQ9 QUESTIONS 1 & 2: 0
1. LITTLE INTEREST OR PLEASURE IN DOING THINGS: NOT AT ALL
SUM OF ALL RESPONSES TO PHQ QUESTIONS 1-9: 0
SUM OF ALL RESPONSES TO PHQ QUESTIONS 1-9: 0
SUM OF ALL RESPONSES TO PHQ9 QUESTIONS 1 & 2: 0
SUM OF ALL RESPONSES TO PHQ QUESTIONS 1-9: 0
SUM OF ALL RESPONSES TO PHQ QUESTIONS 1-9: 0
2. FEELING DOWN, DEPRESSED OR HOPELESS: NOT AT ALL
2. FEELING DOWN, DEPRESSED OR HOPELESS: 0
1. LITTLE INTEREST OR PLEASURE IN DOING THINGS: 0

## 2024-01-30 NOTE — PROGRESS NOTES
[facility]  [address]  [address]  [place] [address]  Dept: [phone]  Dept Fax: [phone]  Loc: [phone]      Subjective:     Chief Complaint   Patient presents with    Hypertension       HPI:  Kevin Piper is a 38 y.o. male presents today for evaluation of elevated BP and increase HR. No chest pains. No sob. He is on Buspar but he states that his present dose does not seem to be effective anymore. He admits periods of panic devin when he is out shopping at [employer].       ROS:   Review of Systems   Constitutional: Negative.  Activity change:  no change, pt is wheelchair bound.   HENT: Negative.     Eyes: Negative.    Respiratory: Negative.  Negative for shortness of breath.    Cardiovascular:  Positive for palpitations. Negative for chest pain.   Gastrointestinal: Negative.    Endocrine: Negative.    Genitourinary: Negative.    Musculoskeletal: Negative.    Neurological: Negative.    Hematological: Negative.    Psychiatric/Behavioral:  The patient is nervous/anxious.        PMHx:  Past Medical History:   Diagnosis Date    Anxiety     Chronic kidney disease     kidney stones started at age 9, treated at [facility].  Has had lithotripsy and surgery to remove stones    Club Foot     Congenital paraplegia (HCC)     Hypertension     diagnosed at age 11    Kidney stones 12/1/2011    Leg fracture, left     while in body cast due to swelling    Myelomeningocele with hydrocephalus (HCC) 1985    Open spina bifida at birth    Neuropathy     Open knee wound, left, subsequent encounter 1/13/2022    Pressure sore on ankle     and knee     Scoliosis     Spastic neurogenic bladder     cather x 5 a day     Ulcer of penis 10/31/2016     (ventriculoperitoneal) shunt status      Patient Active Problem List   Diagnosis    Spina bifida (HCC)    Family history of malignant melanoma of skin    Catheter-associated urinary tract infection (HCC)

## 2024-01-31 RX ORDER — PROPRANOLOL HYDROCHLORIDE 20 MG/1
20 TABLET ORAL DAILY
Qty: 30 TABLET | Refills: 1 | Status: SHIPPED | OUTPATIENT
Start: 2024-01-31

## 2024-01-31 ASSESSMENT — ENCOUNTER SYMPTOMS
EYES NEGATIVE: 1
RESPIRATORY NEGATIVE: 1
SHORTNESS OF BREATH: 0
GASTROINTESTINAL NEGATIVE: 1

## 2024-02-07 DIAGNOSIS — I10 ESSENTIAL HYPERTENSION: ICD-10-CM

## 2024-02-08 RX ORDER — LISINOPRIL 10 MG/1
TABLET ORAL
Qty: 30 TABLET | Refills: 2 | OUTPATIENT
Start: 2024-02-08

## 2024-02-14 NOTE — PATIENT INSTRUCTIONS
We are committed to providing you with the best care possible. In order to help us achieve these goals please remember to bring all medications, herbal products, and over the counter supplements with you to each visit. If your provider has ordered testing for you, please be sure to follow up with our office if you have not received results within 7 days after the testing took place. *If you receive a survey after visiting one of our offices, please take time to share your experience concerning your physician office visit. These surveys are confidential and no health information about you is shared. We are eager to improve for you and we are counting on your feedback to help make that happen. Please follow up with Dr. Carballo within 1 week of discharge for a check up on your heart.

## 2024-02-19 ENCOUNTER — PATIENT MESSAGE (OUTPATIENT)
Dept: PRIMARY CARE CLINIC | Age: 39
End: 2024-02-19

## 2024-02-19 NOTE — TELEPHONE ENCOUNTER
From: Kevin Piper  To: Dr. Molly Martinez  Sent: 2/19/2024 4:17 PM CST  Subject: High blood pressure    In regards to my previous message I rechecked my blood pressure at home after I left [employer]. Checked 3 times. First reading was 141/101 then 128/96 then 1123/95. I have been having daily strong anxiety symptoms. Sweating overwelmness and adrenaline rushes. I increased my Buspar like Dr. Martinez said but it doesn't seem to be helping.

## 2024-02-22 ENCOUNTER — OFFICE VISIT (OUTPATIENT)
Dept: PRIMARY CARE CLINIC | Age: 39
End: 2024-02-22

## 2024-02-22 VITALS
DIASTOLIC BLOOD PRESSURE: 96 MMHG | HEART RATE: 86 BPM | BODY MASS INDEX: 13.35 KG/M2 | WEIGHT: 68 LBS | HEIGHT: 60 IN | OXYGEN SATURATION: 100 % | SYSTOLIC BLOOD PRESSURE: 122 MMHG | TEMPERATURE: 97.6 F

## 2024-02-22 DIAGNOSIS — F41.9 ANXIETY: ICD-10-CM

## 2024-02-22 DIAGNOSIS — I10 ESSENTIAL HYPERTENSION: Primary | ICD-10-CM

## 2024-02-22 ASSESSMENT — ENCOUNTER SYMPTOMS
RESPIRATORY NEGATIVE: 1
EYES NEGATIVE: 1
GASTROINTESTINAL NEGATIVE: 1
SHORTNESS OF BREATH: 0

## 2024-02-22 NOTE — PROGRESS NOTES
follow-up with me.    All questions were answered.  Medications, including possible adverse effects, and instructions were reviewed and  understanding was confirmed.   Follow-up recommendations, including when to contact or return to office (ie; if symptoms worsen or fail to improve), were discussed and acknowledged.    Electronically signed by Molly Martinez MD on 2/22/24 at 11:32 AM CST

## 2024-03-06 ENCOUNTER — PATIENT MESSAGE (OUTPATIENT)
Dept: PRIMARY CARE CLINIC | Age: 39
End: 2024-03-06

## 2024-03-06 DIAGNOSIS — F41.9 ANXIETY: ICD-10-CM

## 2024-03-07 RX ORDER — BUSPIRONE HYDROCHLORIDE 5 MG/1
TABLET ORAL
Qty: 150 TABLET | Refills: 1 | Status: SHIPPED | OUTPATIENT
Start: 2024-03-07

## 2024-03-07 NOTE — TELEPHONE ENCOUNTER
Kevin E Thompson called to request a refill on his medication.      Last office visit : 2/22/2024   Next office visit : 3/14/2024     Requested Prescriptions     Pending Prescriptions Disp Refills    busPIRone (BUSPAR) 5 MG tablet 150 tablet 1     Sig: Take 2 tablet by mouth Morning & Afternoon and 1 tablet by mouth at bedtime as needed for anxiety     New prescription showing for new instruction of medication       Autumn Brand MA

## 2024-03-07 NOTE — TELEPHONE ENCOUNTER
From: Kevin Piper  To: Dr. Molly Martinez  Sent: 3/6/2024 10:41 PM CST  Subject: Buspirone    Hi, I will be out of of my Buspirone on Sunday since I am taking 5 tablets a day now. I still have 3 refills on my current perscription but I just got them refilled on Feb. 19 so Medicaid probably will not pay for a refill this soon. Wondering if you needed to send in a new perscription since I increased my dosage. Thank you.

## 2024-03-11 DIAGNOSIS — I10 ESSENTIAL HYPERTENSION: ICD-10-CM

## 2024-03-11 RX ORDER — LISINOPRIL 10 MG/1
TABLET ORAL
Qty: 30 TABLET | Refills: 2 | OUTPATIENT
Start: 2024-03-11

## 2024-03-11 RX ORDER — LISINOPRIL 10 MG/1
TABLET ORAL
Qty: 90 TABLET | Refills: 0 | Status: SHIPPED | OUTPATIENT
Start: 2024-03-11

## 2024-03-11 NOTE — TELEPHONE ENCOUNTER
Kevin E Thompson called to request a refill on his medication.      Last office visit : 2/22/2024   Next office visit : 5/20/2024     Requested Prescriptions     Pending Prescriptions Disp Refills    lisinopril (PRINIVIL;ZESTRIL) 10 MG tablet 90 tablet 0     Sig: TAKE 1 TABLET BY MOUTH DAILY FOR BLOOD PRESSURE            Autumn Brand MA

## 2024-03-25 DIAGNOSIS — R00.0 TACHYCARDIA: ICD-10-CM

## 2024-03-25 DIAGNOSIS — I10 ESSENTIAL HYPERTENSION: ICD-10-CM

## 2024-03-26 RX ORDER — PROPRANOLOL HYDROCHLORIDE 20 MG/1
20 TABLET ORAL DAILY
Qty: 30 TABLET | Refills: 1 | Status: SHIPPED | OUTPATIENT
Start: 2024-03-26

## 2024-03-26 NOTE — TELEPHONE ENCOUNTER
Kevin E Thompson called to request a refill on his medication.      Last office visit : 2/22/2024   Next office visit : 5/20/2024     Requested Prescriptions     Pending Prescriptions Disp Refills    propranolol (INDERAL) 20 MG tablet 30 tablet 1     Sig: Take 1 tablet by mouth daily            Autumn Brand MA

## 2024-04-01 ENCOUNTER — OFFICE VISIT (OUTPATIENT)
Dept: PRIMARY CARE CLINIC | Age: 39
End: 2024-04-01
Payer: MEDICARE

## 2024-04-01 VITALS
DIASTOLIC BLOOD PRESSURE: 88 MMHG | WEIGHT: 68 LBS | HEIGHT: 60 IN | TEMPERATURE: 99 F | HEART RATE: 117 BPM | SYSTOLIC BLOOD PRESSURE: 138 MMHG | BODY MASS INDEX: 13.35 KG/M2 | OXYGEN SATURATION: 97 %

## 2024-04-01 DIAGNOSIS — N30.01 ACUTE CYSTITIS WITH HEMATURIA: ICD-10-CM

## 2024-04-01 DIAGNOSIS — N30.01 ACUTE CYSTITIS WITH HEMATURIA: Primary | ICD-10-CM

## 2024-04-01 DIAGNOSIS — Z76.0 MEDICATION REFILL: ICD-10-CM

## 2024-04-01 LAB
APPEARANCE FLUID: ABNORMAL
BILIRUBIN, POC: ABNORMAL
BLOOD URINE, POC: ABNORMAL
CLARITY, POC: ABNORMAL
COLOR, POC: ABNORMAL
GLUCOSE URINE, POC: 100
KETONES, POC: NEGATIVE
LEUKOCYTE EST, POC: ABNORMAL
NITRITE, POC: NEGATIVE
PH, POC: 6.5
PROTEIN, POC: 30
SPECIFIC GRAVITY, POC: 1.02
UROBILINOGEN, POC: 4

## 2024-04-01 PROCEDURE — G8427 DOCREV CUR MEDS BY ELIG CLIN: HCPCS | Performed by: FAMILY MEDICINE

## 2024-04-01 PROCEDURE — 3075F SYST BP GE 130 - 139MM HG: CPT | Performed by: FAMILY MEDICINE

## 2024-04-01 PROCEDURE — 3079F DIAST BP 80-89 MM HG: CPT | Performed by: FAMILY MEDICINE

## 2024-04-01 PROCEDURE — G8420 CALC BMI NORM PARAMETERS: HCPCS | Performed by: FAMILY MEDICINE

## 2024-04-01 PROCEDURE — 1036F TOBACCO NON-USER: CPT | Performed by: FAMILY MEDICINE

## 2024-04-01 PROCEDURE — 99213 OFFICE O/P EST LOW 20 MIN: CPT | Performed by: FAMILY MEDICINE

## 2024-04-01 PROCEDURE — 81002 URINALYSIS NONAUTO W/O SCOPE: CPT | Performed by: FAMILY MEDICINE

## 2024-04-01 RX ORDER — NITROFURANTOIN 25; 75 MG/1; MG/1
100 CAPSULE ORAL DAILY
COMMUNITY
Start: 2024-03-18

## 2024-04-01 RX ORDER — SULFAMETHOXAZOLE AND TRIMETHOPRIM 400; 80 MG/1; MG/1
1 TABLET ORAL DAILY
Qty: 10 TABLET | Refills: 0 | Status: SHIPPED | OUTPATIENT
Start: 2024-04-01 | End: 2024-04-11

## 2024-04-01 RX ORDER — MIRABEGRON 50 MG/1
50 TABLET, FILM COATED, EXTENDED RELEASE ORAL DAILY
COMMUNITY

## 2024-04-01 SDOH — ECONOMIC STABILITY: FOOD INSECURITY: WITHIN THE PAST 12 MONTHS, YOU WORRIED THAT YOUR FOOD WOULD RUN OUT BEFORE YOU GOT MONEY TO BUY MORE.: NEVER TRUE

## 2024-04-01 SDOH — ECONOMIC STABILITY: INCOME INSECURITY: HOW HARD IS IT FOR YOU TO PAY FOR THE VERY BASICS LIKE FOOD, HOUSING, MEDICAL CARE, AND HEATING?: NOT HARD AT ALL

## 2024-04-01 SDOH — ECONOMIC STABILITY: FOOD INSECURITY: WITHIN THE PAST 12 MONTHS, THE FOOD YOU BOUGHT JUST DIDN'T LAST AND YOU DIDN'T HAVE MONEY TO GET MORE.: NEVER TRUE

## 2024-04-01 ASSESSMENT — ENCOUNTER SYMPTOMS
EYES NEGATIVE: 1
GASTROINTESTINAL NEGATIVE: 1
VOMITING: 0
RESPIRATORY NEGATIVE: 1
NAUSEA: 0

## 2024-04-01 NOTE — PROGRESS NOTES
[facility]  [address]  [address]  [place] [address]  Dept: [phone]  Dept Fax: [phone]  Loc: [phone]      Subjective:     Chief Complaint   Patient presents with    Fever    Frequent/Recurrent UTI       HPI:  Kevin Piper is a 38 y.o. male presents today with another bout of UTI. He has done well in the last 3 months. He is still taking prophylactic macrodantin. He goes to a urologist in [place] but has not seen her in about a year.   Pt presents with low grade fever. No nausea or vomiting    ROS:   Review of Systems   Constitutional: Negative.  Negative for chills and fever. Activity change: pt is wheelchair bound.  HENT: Negative.     Eyes: Negative.    Respiratory: Negative.     Cardiovascular: Negative.    Gastrointestinal: Negative.  Negative for nausea and vomiting.   Endocrine: Negative.    Genitourinary:  Positive for dysuria, frequency, hematuria (microscopic) and urgency. Negative for flank pain and penile discharge.        Pt self catheterize 6 times a day as needed   Neurological: Negative.    Hematological: Negative.    Psychiatric/Behavioral:  The patient is not nervous/anxious.        PMHx:  Past Medical History:   Diagnosis Date    Anxiety     Chronic kidney disease     kidney stones started at age 9, treated at [place].  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

## 2024-04-03 ENCOUNTER — PATIENT MESSAGE (OUTPATIENT)
Dept: PRIMARY CARE CLINIC | Age: 39
End: 2024-04-03

## 2024-04-04 ENCOUNTER — TELEPHONE (OUTPATIENT)
Dept: PRIMARY CARE CLINIC | Age: 39
End: 2024-04-04

## 2024-04-04 LAB
BACTERIA UR CULT: ABNORMAL
BACTERIA UR CULT: ABNORMAL
ORGANISM: ABNORMAL

## 2024-04-04 NOTE — TELEPHONE ENCOUNTER
From: Kevin Piper  To: Dr. Molly Martinez  Sent: 4/3/2024 6:17 PM CDT  Subject: Urine culture results    When you called and told me my urine culture results were clear of bacteria I noticed I didn't receive it on MyChart. Just wondering if there was a reason. Bactrim is also still on my Medications.

## 2024-04-04 NOTE — TELEPHONE ENCOUNTER
----- Message from Molly Martinez MD sent at 4/2/2024  2:11 PM CDT -----  Please inform pt that his Urine culture did not grow any bacteria. Stop the Bactrim, continue prophylactic Macrodantin

## 2024-04-04 NOTE — TELEPHONE ENCOUNTER
Called and spoke with patient, and re informed him that there was not enough growth in urine culture for him to remain on antibiotic. Informed him to increase water intake and try to keep area as clean as possible when cathing.  Patient verbally understood.

## 2024-05-08 DIAGNOSIS — F41.9 ANXIETY: ICD-10-CM

## 2024-05-08 NOTE — TELEPHONE ENCOUNTER
Kevin E Thompson called to request a refill on his medication.      Last office visit : 4/1/2024   Next office visit : 5/20/2024     Requested Prescriptions     Pending Prescriptions Disp Refills    busPIRone (BUSPAR) 5 MG tablet 150 tablet 1     Sig: Take 2 tablet by mouth Morning & Afternoon and 1 tablet by mouth at bedtime as needed for anxiety            Autumn Brand MA

## 2024-05-09 RX ORDER — BUSPIRONE HYDROCHLORIDE 5 MG/1
TABLET ORAL
Qty: 150 TABLET | Refills: 0 | Status: SHIPPED | OUTPATIENT
Start: 2024-05-09

## 2024-05-20 ENCOUNTER — OFFICE VISIT (OUTPATIENT)
Dept: PRIMARY CARE CLINIC | Age: 39
End: 2024-05-20
Payer: MEDICARE

## 2024-05-20 VITALS
DIASTOLIC BLOOD PRESSURE: 108 MMHG | OXYGEN SATURATION: 99 % | TEMPERATURE: 98.2 F | SYSTOLIC BLOOD PRESSURE: 164 MMHG | HEART RATE: 89 BPM | WEIGHT: 68 LBS | HEIGHT: 60 IN | BODY MASS INDEX: 13.35 KG/M2

## 2024-05-20 DIAGNOSIS — R00.0 TACHYCARDIA: ICD-10-CM

## 2024-05-20 DIAGNOSIS — Q05.9 SPINA BIFIDA, UNSPECIFIED HYDROCEPHALUS PRESENCE, UNSPECIFIED SPINAL REGION (HCC): ICD-10-CM

## 2024-05-20 DIAGNOSIS — F41.9 ANXIETY: ICD-10-CM

## 2024-05-20 DIAGNOSIS — I10 ESSENTIAL HYPERTENSION: Primary | ICD-10-CM

## 2024-05-20 PROBLEM — L97.922 ULCER OF LEFT LOWER EXTREMITY WITH FAT LAYER EXPOSED (HCC): Status: ACTIVE | Noted: 2024-05-20

## 2024-05-20 PROCEDURE — G8427 DOCREV CUR MEDS BY ELIG CLIN: HCPCS | Performed by: FAMILY MEDICINE

## 2024-05-20 PROCEDURE — 3077F SYST BP >= 140 MM HG: CPT | Performed by: FAMILY MEDICINE

## 2024-05-20 PROCEDURE — 3080F DIAST BP >= 90 MM HG: CPT | Performed by: FAMILY MEDICINE

## 2024-05-20 PROCEDURE — 99214 OFFICE O/P EST MOD 30 MIN: CPT | Performed by: FAMILY MEDICINE

## 2024-05-20 PROCEDURE — 1036F TOBACCO NON-USER: CPT | Performed by: FAMILY MEDICINE

## 2024-05-20 PROCEDURE — G8420 CALC BMI NORM PARAMETERS: HCPCS | Performed by: FAMILY MEDICINE

## 2024-05-20 RX ORDER — VALSARTAN 160 MG/1
160 TABLET ORAL DAILY
Qty: 30 TABLET | Refills: 1 | Status: SHIPPED | OUTPATIENT
Start: 2024-05-20

## 2024-05-20 RX ORDER — PROPRANOLOL HYDROCHLORIDE 20 MG/1
20 TABLET ORAL DAILY
Qty: 30 TABLET | Refills: 1 | Status: SHIPPED | OUTPATIENT
Start: 2024-05-20

## 2024-05-20 RX ORDER — BUSPIRONE HYDROCHLORIDE 5 MG/1
TABLET ORAL
Qty: 150 TABLET | Refills: 0 | Status: SHIPPED | OUTPATIENT
Start: 2024-05-20

## 2024-05-20 RX ORDER — LISINOPRIL 10 MG/1
TABLET ORAL
Qty: 90 TABLET | Refills: 0 | Status: CANCELLED | OUTPATIENT
Start: 2024-05-20

## 2024-05-20 ASSESSMENT — ENCOUNTER SYMPTOMS
EYES NEGATIVE: 1
SHORTNESS OF BREATH: 0
GASTROINTESTINAL NEGATIVE: 1
RESPIRATORY NEGATIVE: 1

## 2024-05-20 NOTE — PROGRESS NOTES
propranolol (INDERAL) 20 MG tablet Take 1 tablet by mouth daily 30 tablet 1    valsartan (DIOVAN) 160 MG tablet Take 1 tablet by mouth daily 30 tablet 1    MYRBETRIQ 50 MG TB24 Take 50 mg by mouth daily      nitrofurantoin, macrocrystal-monohydrate, (MACROBID) 100 MG capsule Take 1 capsule by mouth daily      Omega-3 Fatty Acids (THE VERY FINEST FISH OIL) LIQD TAKE 3 ML BY MOUTH DAILY 200 mL 5    Catheters (SELF-CATH PLUS COUDE TIP) MISC Use as needed  (10 French 16 inch)- self catheterize up to 6 times a day.   Needs 200 a month    Dx:Spina Bifida 200 each 5    D-Mannose POWD 1 Units by Does not apply route 2 times daily      Cranberry 200 MG CAPS Take 1 capsule by mouth daily      menthol-zinc oxide (CALMOSEPTINE) 0.44-20.6 % OINT ointment Apply topically daily Max 30 ml per day.      aspirin 81 MG chewable tablet Take 1 tablet by mouth daily       No current facility-administered medications for this visit.       Objective:   PE:  BP (!) 164/108   Pulse 89   Temp 98.2 °F (36.8 °C) (Temporal)   Ht 1.219 m (4')   Wt 30.8 kg (68 lb)   SpO2 99%   BMI 20.75 kg/m²   Physical Exam  Vitals and nursing note reviewed. Exam conducted with a chaperone present (mom and dad).   HENT:      Head: Normocephalic.      Nose: No congestion or rhinorrhea.      Mouth/Throat:      Pharynx: Oropharynx is clear.      Comments: Poor dentition  Eyes:      Conjunctiva/sclera: Conjunctivae normal.   Cardiovascular:      Rate and Rhythm: Normal rate and regular rhythm.      Pulses: Normal pulses.      Heart sounds: Normal heart sounds.   Pulmonary:      Effort: Pulmonary effort is normal.      Breath sounds: Normal breath sounds.   Skin:     General: Skin is warm and dry.   Neurological:      Mental Status: He is alert and oriented to person, place, and time. Mental status is at baseline.   Psychiatric:         Behavior: Behavior normal.            Assessment & Plan   1. Essential hypertension  -     propranolol (INDERAL) 20 MG tablet;

## 2024-06-11 ENCOUNTER — NURSE ONLY (OUTPATIENT)
Dept: PRIMARY CARE CLINIC | Age: 39
End: 2024-06-11
Payer: MEDICARE

## 2024-06-11 DIAGNOSIS — Z23 NEED FOR TUBERCULOSIS VACCINATION: Primary | ICD-10-CM

## 2024-06-11 DIAGNOSIS — Z11.1 TUBERCULOSIS SCREENING: ICD-10-CM

## 2024-06-11 PROCEDURE — 86580 TB INTRADERMAL TEST: CPT | Performed by: FAMILY MEDICINE

## 2024-06-11 NOTE — PROGRESS NOTES
After obtaining consent, and per orders of Dr. Molly Martinez, TB screening was given in Right forearm by Autumn Brand [facility].

## 2024-06-15 DIAGNOSIS — I10 ESSENTIAL HYPERTENSION: ICD-10-CM

## 2024-06-17 RX ORDER — VALSARTAN 160 MG/1
160 TABLET ORAL DAILY
Qty: 30 TABLET | Refills: 1 | OUTPATIENT
Start: 2024-06-17

## 2024-07-01 ENCOUNTER — OFFICE VISIT (OUTPATIENT)
Dept: PRIMARY CARE CLINIC | Age: 39
End: 2024-07-01
Payer: MEDICARE

## 2024-07-01 VITALS
BODY MASS INDEX: 13.94 KG/M2 | TEMPERATURE: 98.4 F | HEART RATE: 91 BPM | SYSTOLIC BLOOD PRESSURE: 136 MMHG | HEIGHT: 60 IN | WEIGHT: 71 LBS | DIASTOLIC BLOOD PRESSURE: 98 MMHG | OXYGEN SATURATION: 99 %

## 2024-07-01 DIAGNOSIS — R00.0 TACHYCARDIA: ICD-10-CM

## 2024-07-01 DIAGNOSIS — I10 ESSENTIAL HYPERTENSION: ICD-10-CM

## 2024-07-01 DIAGNOSIS — F41.9 ANXIETY: ICD-10-CM

## 2024-07-01 PROCEDURE — 3080F DIAST BP >= 90 MM HG: CPT | Performed by: FAMILY MEDICINE

## 2024-07-01 PROCEDURE — 3075F SYST BP GE 130 - 139MM HG: CPT | Performed by: FAMILY MEDICINE

## 2024-07-01 PROCEDURE — G8427 DOCREV CUR MEDS BY ELIG CLIN: HCPCS | Performed by: FAMILY MEDICINE

## 2024-07-01 PROCEDURE — 99213 OFFICE O/P EST LOW 20 MIN: CPT | Performed by: FAMILY MEDICINE

## 2024-07-01 PROCEDURE — 1036F TOBACCO NON-USER: CPT | Performed by: FAMILY MEDICINE

## 2024-07-01 PROCEDURE — G8420 CALC BMI NORM PARAMETERS: HCPCS | Performed by: FAMILY MEDICINE

## 2024-07-01 RX ORDER — BUSPIRONE HYDROCHLORIDE 5 MG/1
TABLET ORAL
Qty: 150 TABLET | Refills: 0 | Status: SHIPPED | OUTPATIENT
Start: 2024-07-01

## 2024-07-01 RX ORDER — VALSARTAN 160 MG/1
160 TABLET ORAL DAILY
Qty: 90 TABLET | Refills: 0 | Status: SHIPPED | OUTPATIENT
Start: 2024-07-01

## 2024-07-01 RX ORDER — PROPRANOLOL HYDROCHLORIDE 20 MG/1
20 TABLET ORAL DAILY
Qty: 90 TABLET | Refills: 0 | Status: SHIPPED | OUTPATIENT
Start: 2024-07-01

## 2024-07-01 NOTE — PROGRESS NOTES
Blood Pressure Mother     Thyroid Disease Mother         Hypothyroidism    Coronary Art Dis Maternal Grandmother         CABG x 2    Heart Attack Maternal Grandmother     High Blood Pressure Maternal Grandmother     Cancer Maternal Grandmother         Pancreatic Cancer    Heart Failure Maternal Grandfather          in     Immunodeficiency Neg Hx        SocialHx:  Social History     Tobacco Use    Smoking status: Never    Smokeless tobacco: Never   Substance Use Topics    Alcohol use: No       Allergies:  Allergies   Allergen Reactions    Latex Swelling and Rash     Had reaction at dentist office-redness, swelling, and rash  Other reaction(s): rash  Had reaction at dentist office-redness, swelling, and rash    Ceftriaxone Other (See Comments)     Other reaction(s): Other (see comments)  Fever, hallucinations was transported via ambulance [facility]   Other reaction(s): high fever  Fever, hallucinations was transported via ambulance [facility]     Pravastatin      Myalgias    Vancomycin Other (See Comments)     Other reaction(s): Other (See Comments)  Red warm hands and itching  Red warm hands and itching       Medications:  Current Outpatient Medications   Medication Sig Dispense Refill    busPIRone (BUSPAR) 5 MG tablet Take 2 tablet by mouth Morning & Afternoon and 1 tablet by mouth at bedtime as needed for anxiety 150 tablet 0    propranolol (INDERAL) 20 MG tablet Take 1 tablet by mouth daily 90 tablet 0    valsartan (DIOVAN) 160 MG tablet Take 1 tablet by mouth daily 90 tablet 0    MYRBETRIQ 50 MG TB24 Take 50 mg by mouth daily      nitrofurantoin, macrocrystal-monohydrate, (MACROBID) 100 MG capsule Take 1 capsule by mouth daily      Omega-3 Fatty Acids (THE VERY FINEST FISH OIL) LIQD TAKE 3 ML BY MOUTH DAILY 200 mL 5    Catheters (SELF-CATH PLUS COUDE TIP) MISC Use as needed  (10 French 16 inch)- self catheterize up to 6 times a day.   Needs 200 a month    Dx:Spina Bifida 200 each 5    D-Mannose POWD 1 Units

## 2024-07-26 DIAGNOSIS — I10 ESSENTIAL HYPERTENSION: ICD-10-CM

## 2024-07-26 LAB
ALBUMIN SERPL-MCNC: 4.2 G/DL (ref 3.5–5.2)
ALP SERPL-CCNC: 89 U/L (ref 40–130)
ALT SERPL-CCNC: 18 U/L (ref 5–41)
ANION GAP SERPL CALCULATED.3IONS-SCNC: 10 MMOL/L (ref 7–19)
AST SERPL-CCNC: 18 U/L (ref 5–40)
BASOPHILS # BLD: 0 K/UL (ref 0–0.2)
BASOPHILS NFR BLD: 0.7 % (ref 0–1)
BILIRUB SERPL-MCNC: 0.5 MG/DL (ref 0.2–1.2)
BUN SERPL-MCNC: 14 MG/DL (ref 6–20)
CALCIUM SERPL-MCNC: 9.2 MG/DL (ref 8.6–10)
CHLORIDE SERPL-SCNC: 105 MMOL/L (ref 98–111)
CHOLEST SERPL-MCNC: 182 MG/DL (ref 160–199)
CO2 SERPL-SCNC: 26 MMOL/L (ref 22–29)
CREAT SERPL-MCNC: 0.4 MG/DL (ref 0.5–1.2)
EOSINOPHIL # BLD: 0.1 K/UL (ref 0–0.6)
EOSINOPHIL NFR BLD: 1.6 % (ref 0–5)
ERYTHROCYTE [DISTWIDTH] IN BLOOD BY AUTOMATED COUNT: 12 % (ref 11.5–14.5)
GLUCOSE SERPL-MCNC: 87 MG/DL (ref 74–109)
HCT VFR BLD AUTO: 44.1 % (ref 42–52)
HDLC SERPL-MCNC: 52 MG/DL (ref 55–121)
HGB BLD-MCNC: 14.3 G/DL (ref 14–18)
IMM GRANULOCYTES # BLD: 0 K/UL
LDLC SERPL CALC-MCNC: 121 MG/DL
LYMPHOCYTES # BLD: 2.4 K/UL (ref 1.1–4.5)
LYMPHOCYTES NFR BLD: 42.4 % (ref 20–40)
MCH RBC QN AUTO: 31.6 PG (ref 27–31)
MCHC RBC AUTO-ENTMCNC: 32.4 G/DL (ref 33–37)
MCV RBC AUTO: 97.4 FL (ref 80–94)
MONOCYTES # BLD: 0.5 K/UL (ref 0–0.9)
MONOCYTES NFR BLD: 8 % (ref 0–10)
NEUTROPHILS # BLD: 2.6 K/UL (ref 1.5–7.5)
NEUTS SEG NFR BLD: 47.1 % (ref 50–65)
PLATELET # BLD AUTO: 248 K/UL (ref 130–400)
PMV BLD AUTO: 10.3 FL (ref 9.4–12.4)
POTASSIUM SERPL-SCNC: 4.4 MMOL/L (ref 3.5–5)
PROT SERPL-MCNC: 6.6 G/DL (ref 6.6–8.7)
RBC # BLD AUTO: 4.53 M/UL (ref 4.7–6.1)
SODIUM SERPL-SCNC: 141 MMOL/L (ref 136–145)
TRIGL SERPL-MCNC: 47 MG/DL (ref 0–149)
WBC # BLD AUTO: 5.6 K/UL (ref 4.8–10.8)

## 2024-07-27 SDOH — HEALTH STABILITY: PHYSICAL HEALTH
ON AVERAGE, HOW MANY DAYS PER WEEK DO YOU ENGAGE IN MODERATE TO STRENUOUS EXERCISE (LIKE A BRISK WALK)?: PATIENT DECLINED

## 2024-07-27 ASSESSMENT — PATIENT HEALTH QUESTIONNAIRE - PHQ9
SUM OF ALL RESPONSES TO PHQ QUESTIONS 1-9: 0
1. LITTLE INTEREST OR PLEASURE IN DOING THINGS: NOT AT ALL
SUM OF ALL RESPONSES TO PHQ QUESTIONS 1-9: 0
SUM OF ALL RESPONSES TO PHQ QUESTIONS 1-9: 0
SUM OF ALL RESPONSES TO PHQ9 QUESTIONS 1 & 2: 0
SUM OF ALL RESPONSES TO PHQ QUESTIONS 1-9: 0
2. FEELING DOWN, DEPRESSED OR HOPELESS: NOT AT ALL

## 2024-07-27 ASSESSMENT — LIFESTYLE VARIABLES
HOW OFTEN DO YOU HAVE SIX OR MORE DRINKS ON ONE OCCASION: 1
HOW OFTEN DO YOU HAVE A DRINK CONTAINING ALCOHOL: NEVER
HOW MANY STANDARD DRINKS CONTAINING ALCOHOL DO YOU HAVE ON A TYPICAL DAY: PATIENT DOES NOT DRINK
HOW MANY STANDARD DRINKS CONTAINING ALCOHOL DO YOU HAVE ON A TYPICAL DAY: 0
HOW OFTEN DO YOU HAVE A DRINK CONTAINING ALCOHOL: 1

## 2024-07-29 ENCOUNTER — OFFICE VISIT (OUTPATIENT)
Dept: PRIMARY CARE CLINIC | Age: 39
End: 2024-07-29
Payer: MEDICARE

## 2024-07-29 VITALS
TEMPERATURE: 98.2 F | SYSTOLIC BLOOD PRESSURE: 136 MMHG | WEIGHT: 71 LBS | OXYGEN SATURATION: 98 % | DIASTOLIC BLOOD PRESSURE: 88 MMHG | BODY MASS INDEX: 13.94 KG/M2 | HEART RATE: 89 BPM | HEIGHT: 60 IN

## 2024-07-29 DIAGNOSIS — Z00.00 MEDICARE ANNUAL WELLNESS VISIT, SUBSEQUENT: Primary | ICD-10-CM

## 2024-07-29 PROCEDURE — 3079F DIAST BP 80-89 MM HG: CPT | Performed by: FAMILY MEDICINE

## 2024-07-29 PROCEDURE — G0439 PPPS, SUBSEQ VISIT: HCPCS | Performed by: FAMILY MEDICINE

## 2024-07-29 PROCEDURE — 3075F SYST BP GE 130 - 139MM HG: CPT | Performed by: FAMILY MEDICINE

## 2024-07-29 NOTE — PATIENT INSTRUCTIONS
We are committed to providing you with the best care possible.   In order to help us achieve these goals please remember to bring all medications, herbal products, and over the counter supplements with you to each visit.     If your provider has ordered testing for you, please be sure to follow up with our office if you have not received results within 7 days after the testing took place.     *If you receive a survey after visiting one of our offices, please take time to share your experience concerning your physician office visit. These surveys are confidential and no health information about you is shared.  We are eager to improve for you and we are counting on your feedback to help make that happen.         Learning About Vision Tests  What are vision tests?     The four most common vision tests are visual acuity tests, refraction, visual field tests, and color vision tests.  Visual acuity (sharpness) tests  These tests are used:  To see if you need glasses or contact lenses.  To monitor an eye problem.  To check an eye injury.  Visual acuity tests are done as part of routine exams. You may also have this test when you get your 's license or apply for some types of jobs.  Visual field tests  These tests are used:  To check for vision loss in any area of your range of vision.  To screen for certain eye diseases.  To look for nerve damage after a stroke, head injury, or other problem that could reduce blood flow to the brain.  Refraction and color tests  A refraction test is done to find the right prescription for glasses and contact lenses.  A color vision test is done to check for color blindness.  Color vision is often tested as part of a routine exam. You may also have this test when you apply for a job where recognizing different colors is important, such as , electronics, or the .  How are vision tests done?  Visual acuity test   You cover one eye at a time.  You read aloud from a

## 2024-07-29 NOTE — PROGRESS NOTES
MD Jennifer   Cranberry 200 MG CAPS Take 1 capsule by mouth daily Yes Provider, MD Jennifer   menthol-zinc oxide (CALMOSEPTINE) 0.44-20.6 % OINT ointment Apply topically daily Max 30 ml per day. Yes ProviderJennifer MD   aspirin 81 MG chewable tablet Take 1 tablet by mouth daily Yes Provider, MD Jennifer       CareTeam (Including outside providers/suppliers regularly involved in providing care):   Patient Care Team:  Molly Martinez MD as PCP - General (Family Medicine)  [facility] Martinez [facility]  NO TEAM MEMBERS      Reviewed and updated this visit:  Allergies  Meds  Med Hx  Surg Hx  Soc Hx  Fam Hx

## 2024-08-06 DIAGNOSIS — F41.9 ANXIETY: ICD-10-CM

## 2024-08-06 RX ORDER — BUSPIRONE HYDROCHLORIDE 5 MG/1
TABLET ORAL
Qty: 150 TABLET | Refills: 0 | Status: SHIPPED | OUTPATIENT
Start: 2024-08-06

## 2024-08-06 NOTE — TELEPHONE ENCOUNTER
Kevin E Thompson called to request a refill on his medication.      Last office visit : 7/29/2024   Next office visit : 10/28/2024     Requested Prescriptions     Pending Prescriptions Disp Refills    busPIRone (BUSPAR) 5 MG tablet [Pharmacy Med Name: BUSPIRONE HYDROCHLORIDE 5MG TABLET] 150 tablet 0     Sig: TAKE 2 TABLET BY MOUTH MORNING & AFTERNOON AND 1 TABLET BY MOUTH AT BEDTIME AS NEEDED FOR ANXIETY            Autumn Brand MA

## 2024-09-04 DIAGNOSIS — F41.9 ANXIETY: ICD-10-CM

## 2024-09-04 RX ORDER — BUSPIRONE HYDROCHLORIDE 5 MG/1
TABLET ORAL
Qty: 150 TABLET | Refills: 0 | Status: SHIPPED | OUTPATIENT
Start: 2024-09-04

## 2024-09-04 NOTE — TELEPHONE ENCOUNTER
Kevin E Thompson called to request a refill on his medication.      Last office visit : 7/29/2024   Next office visit : 10/28/2024     Requested Prescriptions     Pending Prescriptions Disp Refills    busPIRone (BUSPAR) 5 MG tablet [Pharmacy Med Name: BUSPIRONE HYDROCHLORIDE 5MG TABLET] 150 tablet 0     Sig: TAKE 2 TABLET BY MOUTH MORNING & AFTERNOON AND 1 TABLET BY MOUTH AT BEDTIME AS NEEDED FOR ANXIETY            Dhara Georges MA

## 2024-09-17 DIAGNOSIS — I10 ESSENTIAL HYPERTENSION: ICD-10-CM

## 2024-09-17 DIAGNOSIS — R00.0 TACHYCARDIA: ICD-10-CM

## 2024-09-17 DIAGNOSIS — F41.9 ANXIETY: ICD-10-CM

## 2024-09-18 RX ORDER — PROPRANOLOL HCL 20 MG
20 TABLET ORAL DAILY
Qty: 90 TABLET | Refills: 0 | Status: SHIPPED | OUTPATIENT
Start: 2024-09-18

## 2024-10-03 DIAGNOSIS — F41.9 ANXIETY: ICD-10-CM

## 2024-10-04 DIAGNOSIS — F41.9 ANXIETY: ICD-10-CM

## 2024-10-04 RX ORDER — BUSPIRONE HYDROCHLORIDE 5 MG/1
TABLET ORAL
Qty: 150 TABLET | Refills: 0 | Status: SHIPPED | OUTPATIENT
Start: 2024-10-04

## 2024-10-06 DIAGNOSIS — I10 ESSENTIAL HYPERTENSION: ICD-10-CM

## 2024-10-07 RX ORDER — VALSARTAN 160 MG/1
160 TABLET ORAL DAILY
Qty: 30 TABLET | Refills: 0 | Status: SHIPPED | OUTPATIENT
Start: 2024-10-07

## 2024-10-07 RX ORDER — BUSPIRONE HYDROCHLORIDE 5 MG/1
TABLET ORAL
Qty: 150 TABLET | Refills: 0 | OUTPATIENT
Start: 2024-10-07

## 2024-10-07 NOTE — TELEPHONE ENCOUNTER
Kevin E Thompson called to request a refill on his medication.      Last office visit : 7/29/2024   Next office visit : 10/28/2024     Requested Prescriptions     Pending Prescriptions Disp Refills    valsartan (DIOVAN) 160 MG tablet 30 tablet 0     Sig: Take 1 tablet by mouth daily            Chioma Vincent LPN

## 2024-10-07 NOTE — TELEPHONE ENCOUNTER
Kevin E Thompson called to request a refill on his medication.      Last office visit : 7/29/2024   Next office visit : 10/4/2024     Requested Prescriptions     Refused Prescriptions Disp Refills    busPIRone (BUSPAR) 5 MG tablet 150 tablet 0     Sig: Take 2 tablet by mouth Morning & Afternoon and 1 tablet by mouth at bedtime as needed for anxiety            Chioma Vincent, LPN

## 2024-10-28 ENCOUNTER — OFFICE VISIT (OUTPATIENT)
Dept: PRIMARY CARE CLINIC | Age: 39
End: 2024-10-28
Payer: MEDICARE

## 2024-10-28 VITALS
DIASTOLIC BLOOD PRESSURE: 88 MMHG | BODY MASS INDEX: 13.94 KG/M2 | TEMPERATURE: 97.3 F | HEIGHT: 60 IN | HEART RATE: 82 BPM | WEIGHT: 71 LBS | OXYGEN SATURATION: 99 % | SYSTOLIC BLOOD PRESSURE: 134 MMHG

## 2024-10-28 DIAGNOSIS — F41.9 ANXIETY: ICD-10-CM

## 2024-10-28 DIAGNOSIS — I10 ESSENTIAL HYPERTENSION: Primary | ICD-10-CM

## 2024-10-28 DIAGNOSIS — Z23 FLU VACCINE NEED: ICD-10-CM

## 2024-10-28 DIAGNOSIS — N31.8 SPASTIC NEUROGENIC BLADDER: ICD-10-CM

## 2024-10-28 DIAGNOSIS — R00.0 TACHYCARDIA: ICD-10-CM

## 2024-10-28 DIAGNOSIS — Q05.9 SPINA BIFIDA, UNSPECIFIED HYDROCEPHALUS PRESENCE, UNSPECIFIED SPINAL REGION (HCC): ICD-10-CM

## 2024-10-28 PROBLEM — L97.922 ULCER OF LEFT LOWER EXTREMITY WITH FAT LAYER EXPOSED (HCC): Status: RESOLVED | Noted: 2024-05-20 | Resolved: 2024-10-28

## 2024-10-28 PROCEDURE — 3079F DIAST BP 80-89 MM HG: CPT | Performed by: FAMILY MEDICINE

## 2024-10-28 PROCEDURE — G0008 ADMIN INFLUENZA VIRUS VAC: HCPCS | Performed by: FAMILY MEDICINE

## 2024-10-28 PROCEDURE — G8420 CALC BMI NORM PARAMETERS: HCPCS | Performed by: FAMILY MEDICINE

## 2024-10-28 PROCEDURE — 90661 CCIIV3 VAC ABX FR 0.5 ML IM: CPT | Performed by: FAMILY MEDICINE

## 2024-10-28 PROCEDURE — G8484 FLU IMMUNIZE NO ADMIN: HCPCS | Performed by: FAMILY MEDICINE

## 2024-10-28 PROCEDURE — 99214 OFFICE O/P EST MOD 30 MIN: CPT | Performed by: FAMILY MEDICINE

## 2024-10-28 PROCEDURE — 1036F TOBACCO NON-USER: CPT | Performed by: FAMILY MEDICINE

## 2024-10-28 PROCEDURE — 3075F SYST BP GE 130 - 139MM HG: CPT | Performed by: FAMILY MEDICINE

## 2024-10-28 PROCEDURE — G8427 DOCREV CUR MEDS BY ELIG CLIN: HCPCS | Performed by: FAMILY MEDICINE

## 2024-10-28 RX ORDER — BUSPIRONE HYDROCHLORIDE 5 MG/1
TABLET ORAL
Qty: 150 TABLET | Refills: 0 | Status: SHIPPED | OUTPATIENT
Start: 2024-10-28

## 2024-10-28 RX ORDER — VALSARTAN 160 MG/1
160 TABLET ORAL DAILY
Qty: 90 TABLET | Refills: 0 | Status: SHIPPED | OUTPATIENT
Start: 2024-10-28

## 2024-10-28 RX ORDER — PROPRANOLOL HCL 20 MG
20 TABLET ORAL DAILY
Qty: 90 TABLET | Refills: 0 | Status: SHIPPED | OUTPATIENT
Start: 2024-10-28

## 2024-10-28 ASSESSMENT — ENCOUNTER SYMPTOMS
RESPIRATORY NEGATIVE: 1
GASTROINTESTINAL NEGATIVE: 1
EYES NEGATIVE: 1
SHORTNESS OF BREATH: 0

## 2024-10-28 NOTE — PROGRESS NOTES
[facility]  [address]  [address]  [place] [address]  Dept: [phone]  Dept Fax: [phone]  Loc: [phone]      Subjective:     Chief Complaint   Patient presents with    3 Month Follow-Up       HPI:  Kevin Piper is a 39 y.o. male presents today for his routine follow-up  Pt is due for refills of his catheters again.   Pt has spina bifida and also has s spastic bladder  He has been doing self-catheteriization (since he was age 13-14) five to six times a day.   BP today is well controlled.  NIDIA stable on meds  No new problems voiced    ROS:   Review of Systems   Constitutional: Negative.  Activity change:  no change, pt is wheelchair bound.   HENT: Negative.     Eyes: Negative.    Respiratory: Negative.  Negative for shortness of breath.    Cardiovascular:  Negative for chest pain and palpitations (has improved with the addition of propranolol).   Gastrointestinal: Negative.    Endocrine: Negative.    Genitourinary: Negative.    Musculoskeletal: Negative.    Neurological: Negative.    Hematological: Negative.    Psychiatric/Behavioral: Negative.         PMHx:  Past Medical History:   Diagnosis Date    Anxiety     Chronic kidney disease     kidney stones started at age 9, treated at [place].  Has had lithotripsy and surgery to remove stones    Club Foot     Congenital paraplegia (HCC)     Hypertension     diagnosed at age 11    Kidney stones 12/1/2011    Leg fracture, left     while in body cast due to swelling    Myelomeningocele with hydrocephalus (HCC) 1985    Open spina bifida at birth    Neuropathy     Open knee wound, left, subsequent encounter 1/13/2022    Pressure sore on ankle     and knee     Scoliosis     Spastic neurogenic bladder     cather x 5 a day     Ulcer of penis 10/31/2016     (ventriculoperitoneal) shunt status      Patient Active Problem List   Diagnosis    Spina bifida (HCC)    Family history of malignant

## 2024-10-28 NOTE — PROGRESS NOTES
After obtaining consent, and per orders of Dr. Molly Martinez, injection of Flucelvax influenza injection given in Right deltoid by Autumn Brand [facility]. Patient signed consent scanned into patients chart.

## 2024-12-02 DIAGNOSIS — F41.9 ANXIETY: ICD-10-CM

## 2024-12-03 RX ORDER — BUSPIRONE HYDROCHLORIDE 5 MG/1
TABLET ORAL
Qty: 150 TABLET | Refills: 0 | Status: SHIPPED | OUTPATIENT
Start: 2024-12-03

## 2024-12-03 NOTE — TELEPHONE ENCOUNTER
Kevin E Thompson called to request a refill on his medication.      Last office visit : 10/28/2024   Next office visit : 1/27/2025     Requested Prescriptions     Pending Prescriptions Disp Refills    busPIRone (BUSPAR) 5 MG tablet 150 tablet 0     Sig: Take 2 tablet by mouth Morning & Afternoon and 1 tablet by mouth at bedtime as needed for anxiety            Chioma Vincent, LPN

## 2024-12-16 DIAGNOSIS — I10 ESSENTIAL HYPERTENSION: ICD-10-CM

## 2024-12-16 DIAGNOSIS — R00.0 TACHYCARDIA: ICD-10-CM

## 2024-12-16 DIAGNOSIS — F41.9 ANXIETY: ICD-10-CM

## 2024-12-16 RX ORDER — PROPRANOLOL HCL 20 MG
20 TABLET ORAL DAILY
Qty: 90 TABLET | Refills: 0 | OUTPATIENT
Start: 2024-12-16

## 2024-12-16 NOTE — TELEPHONE ENCOUNTER
Kevin E Thompson called to request a refill on his medication.      Last office visit : 10/28/2024   Next office visit : 1/27/2025     Requested Prescriptions     Pending Prescriptions Disp Refills    propranolol (INDERAL) 20 MG tablet 90 tablet 0     Sig: Take 1 tablet by mouth daily            Chioma Vincent LPN

## 2024-12-26 DIAGNOSIS — F41.9 ANXIETY: ICD-10-CM

## 2025-01-02 RX ORDER — BUSPIRONE HYDROCHLORIDE 5 MG/1
TABLET ORAL
Qty: 150 TABLET | Refills: 0 | Status: SHIPPED | OUTPATIENT
Start: 2025-01-02

## 2025-01-25 SDOH — ECONOMIC STABILITY: INCOME INSECURITY: IN THE LAST 12 MONTHS, WAS THERE A TIME WHEN YOU WERE NOT ABLE TO PAY THE MORTGAGE OR RENT ON TIME?: NO

## 2025-01-25 SDOH — ECONOMIC STABILITY: FOOD INSECURITY: WITHIN THE PAST 12 MONTHS, THE FOOD YOU BOUGHT JUST DIDN'T LAST AND YOU DIDN'T HAVE MONEY TO GET MORE.: NEVER TRUE

## 2025-01-25 SDOH — ECONOMIC STABILITY: FOOD INSECURITY: WITHIN THE PAST 12 MONTHS, YOU WORRIED THAT YOUR FOOD WOULD RUN OUT BEFORE YOU GOT MONEY TO BUY MORE.: NEVER TRUE

## 2025-01-25 ASSESSMENT — PATIENT HEALTH QUESTIONNAIRE - PHQ9
SUM OF ALL RESPONSES TO PHQ QUESTIONS 1-9: 0
1. LITTLE INTEREST OR PLEASURE IN DOING THINGS: NOT AT ALL
2. FEELING DOWN, DEPRESSED OR HOPELESS: NOT AT ALL
SUM OF ALL RESPONSES TO PHQ9 QUESTIONS 1 & 2: 0
SUM OF ALL RESPONSES TO PHQ9 QUESTIONS 1 & 2: 0
SUM OF ALL RESPONSES TO PHQ QUESTIONS 1-9: 0
1. LITTLE INTEREST OR PLEASURE IN DOING THINGS: NOT AT ALL
SUM OF ALL RESPONSES TO PHQ QUESTIONS 1-9: 0
SUM OF ALL RESPONSES TO PHQ QUESTIONS 1-9: 0
2. FEELING DOWN, DEPRESSED OR HOPELESS: NOT AT ALL

## 2025-01-27 ENCOUNTER — OFFICE VISIT (OUTPATIENT)
Dept: PRIMARY CARE CLINIC | Age: 40
End: 2025-01-27
Payer: MEDICARE

## 2025-01-27 VITALS
HEIGHT: 60 IN | TEMPERATURE: 98.4 F | DIASTOLIC BLOOD PRESSURE: 96 MMHG | SYSTOLIC BLOOD PRESSURE: 142 MMHG | WEIGHT: 71 LBS | HEART RATE: 85 BPM | BODY MASS INDEX: 13.94 KG/M2 | OXYGEN SATURATION: 99 %

## 2025-01-27 DIAGNOSIS — I10 ESSENTIAL HYPERTENSION: ICD-10-CM

## 2025-01-27 DIAGNOSIS — G80.9: ICD-10-CM

## 2025-01-27 DIAGNOSIS — F41.1 GAD (GENERALIZED ANXIETY DISORDER): Primary | ICD-10-CM

## 2025-01-27 PROBLEM — S81.002D OPEN KNEE WOUND, LEFT, SUBSEQUENT ENCOUNTER: Status: RESOLVED | Noted: 2022-01-13 | Resolved: 2025-01-27

## 2025-01-27 PROCEDURE — G8420 CALC BMI NORM PARAMETERS: HCPCS | Performed by: FAMILY MEDICINE

## 2025-01-27 PROCEDURE — 3080F DIAST BP >= 90 MM HG: CPT | Performed by: FAMILY MEDICINE

## 2025-01-27 PROCEDURE — 1036F TOBACCO NON-USER: CPT | Performed by: FAMILY MEDICINE

## 2025-01-27 PROCEDURE — 3077F SYST BP >= 140 MM HG: CPT | Performed by: FAMILY MEDICINE

## 2025-01-27 PROCEDURE — G8427 DOCREV CUR MEDS BY ELIG CLIN: HCPCS | Performed by: FAMILY MEDICINE

## 2025-01-27 PROCEDURE — 99213 OFFICE O/P EST LOW 20 MIN: CPT | Performed by: FAMILY MEDICINE

## 2025-01-27 RX ORDER — VALSARTAN 160 MG/1
160 TABLET ORAL DAILY
Qty: 90 TABLET | Refills: 0 | Status: SHIPPED | OUTPATIENT
Start: 2025-01-27

## 2025-01-27 ASSESSMENT — ENCOUNTER SYMPTOMS
RESPIRATORY NEGATIVE: 1
SHORTNESS OF BREATH: 0
EYES NEGATIVE: 1
GASTROINTESTINAL NEGATIVE: 1

## 2025-01-27 NOTE — PROGRESS NOTES
[facility]  [address]  [address]  [place] [address]  Dept: [phone]  Dept Fax: [phone]  Loc: [phone]      Subjective:     Chief Complaint   Patient presents with    3 Month Follow-Up       HPI  Kevin Piper is a 39 y.o. male presents today for routine follow-up and chronic care management  Pt's BP is slightly elevated today. He tends to be anxious and his mom states he often gets himself worked up.  Family states he is following a good diet  Overall, he is doing well. No reports of sob or CP    ROS:   Review of Systems   Constitutional: Negative.  Activity change:  no change, pt is wheelchair bound.   HENT: Negative.     Eyes: Negative.    Respiratory: Negative.  Negative for shortness of breath.    Cardiovascular:  Negative for chest pain and palpitations (has improved with the addition of propranolol).   Gastrointestinal: Negative.    Endocrine: Negative.    Genitourinary: Negative.    Musculoskeletal: Negative.    Neurological: Negative.    Hematological: Negative.    Psychiatric/Behavioral: Negative.         PMHx:  Past Medical History:   Diagnosis Date    Anxiety     Chronic kidney disease     kidney stones started at age 9, treated at [place].  Has had lithotripsy and surgery to remove stones    Club Foot     Congenital paraplegia (HCC)     Hypertension     diagnosed at age 11    Kidney stones 12/01/2011    Leg fracture, left     while in body cast due to swelling    Myelomeningocele with hydrocephalus (HCC) 1985    Open spina bifida at birth    Neuropathy     Open knee wound, left, subsequent encounter 01/13/2022    Pressure sore on ankle     and knee     Recurrent UTI     Scoliosis     Spastic neurogenic bladder     cather x 5 a day     Ulcer of penis 10/31/2016     (ventriculoperitoneal) shunt status      Patient Active Problem List   Diagnosis    Spina bifida (HCC)    Family history of malignant melanoma of skin

## 2025-01-30 DIAGNOSIS — F41.9 ANXIETY: ICD-10-CM

## 2025-01-30 RX ORDER — BUSPIRONE HYDROCHLORIDE 5 MG/1
TABLET ORAL
Qty: 150 TABLET | Refills: 1 | Status: SHIPPED | OUTPATIENT
Start: 2025-01-30

## 2025-01-30 NOTE — TELEPHONE ENCOUNTER
Kevin E Thompson called to request a refill on his medication.      Last office visit : 1/27/2025   Next office visit : 7/14/2025     Requested Prescriptions     Pending Prescriptions Disp Refills    busPIRone (BUSPAR) 5 MG tablet 150 tablet 0     Sig: Take 2 tablet by mouth Morning & Afternoon and 1 tablet by mouth at bedtime as needed for anxiety            Chioma Vincent, LPN

## 2025-02-11 ENCOUNTER — PATIENT MESSAGE (OUTPATIENT)
Dept: PRIMARY CARE CLINIC | Age: 40
End: 2025-02-11

## 2025-02-11 DIAGNOSIS — R82.90 CLOUDY URINE: Primary | ICD-10-CM

## 2025-02-11 DIAGNOSIS — R82.90 CLOUDY URINE: ICD-10-CM

## 2025-02-11 LAB
BACTERIA URNS QL MICRO: ABNORMAL /HPF
BILIRUB UR QL STRIP: NEGATIVE
CLARITY UR: CLEAR
COLOR UR: YELLOW
CRYSTALS URNS MICRO: ABNORMAL /HPF
EPI CELLS #/AREA URNS AUTO: 1 /HPF (ref 0–5)
GLUCOSE UR STRIP.AUTO-MCNC: NEGATIVE MG/DL
HGB UR STRIP.AUTO-MCNC: NEGATIVE MG/L
HYALINE CASTS #/AREA URNS AUTO: 0 /HPF (ref 0–8)
KETONES UR STRIP.AUTO-MCNC: NEGATIVE MG/DL
LEUKOCYTE ESTERASE UR QL STRIP.AUTO: ABNORMAL
NITRITE UR QL STRIP.AUTO: NEGATIVE
PH UR STRIP.AUTO: 6.5 [PH] (ref 5–8)
PROT UR STRIP.AUTO-MCNC: NEGATIVE MG/DL
RBC #/AREA URNS AUTO: 0 /HPF (ref 0–4)
SP GR UR STRIP.AUTO: 1 (ref 1–1.03)
UROBILINOGEN UR STRIP.AUTO-MCNC: 1 E.U./DL
WBC #/AREA URNS AUTO: 12 /HPF (ref 0–5)

## 2025-02-12 RX ORDER — CIPROFLOXACIN 500 MG/1
500 TABLET, FILM COATED ORAL 2 TIMES DAILY
Qty: 14 TABLET | Refills: 0 | Status: SHIPPED | OUTPATIENT
Start: 2025-02-12 | End: 2025-02-19

## 2025-02-14 LAB
BACTERIA UR CULT: ABNORMAL
BACTERIA UR CULT: ABNORMAL
ORGANISM: ABNORMAL

## 2025-03-18 DIAGNOSIS — F41.9 ANXIETY: ICD-10-CM

## 2025-03-18 DIAGNOSIS — I10 ESSENTIAL HYPERTENSION: ICD-10-CM

## 2025-03-18 DIAGNOSIS — R00.0 TACHYCARDIA: ICD-10-CM

## 2025-03-18 RX ORDER — PROPRANOLOL HCL 20 MG
20 TABLET ORAL DAILY
Qty: 90 TABLET | Refills: 0 | Status: SHIPPED | OUTPATIENT
Start: 2025-03-18

## 2025-03-18 NOTE — TELEPHONE ENCOUNTER
Kevin E Thompson called to request a refill on his medication.      Last office visit : 1/27/2025   Next office visit : 7/14/2025     Requested Prescriptions     Pending Prescriptions Disp Refills    propranolol (INDERAL) 20 MG tablet 90 tablet 0     Sig: Take 1 tablet by mouth daily            Autumn Brand MA

## 2025-03-31 DIAGNOSIS — F41.9 ANXIETY: ICD-10-CM

## 2025-03-31 RX ORDER — BUSPIRONE HYDROCHLORIDE 10 MG/1
TABLET ORAL
Qty: 90 TABLET | Refills: 0 | Status: SHIPPED | OUTPATIENT
Start: 2025-03-31

## 2025-03-31 NOTE — TELEPHONE ENCOUNTER
Kevin E Thompson called to request a refill on his medication.      Last office visit : 1/27/2025   Next office visit : 7/14/2025     Requested Prescriptions     Pending Prescriptions Disp Refills    busPIRone (BUSPAR) 5 MG tablet 150 tablet 1     Sig: Take 2 tablet by mouth Morning & Afternoon and 1 tablet by mouth at bedtime as needed for anxiety            Donna Andrea

## 2025-04-01 DIAGNOSIS — F41.9 ANXIETY: ICD-10-CM

## 2025-04-02 RX ORDER — BUSPIRONE HYDROCHLORIDE 5 MG/1
TABLET ORAL
Qty: 150 TABLET | Refills: 1 | OUTPATIENT
Start: 2025-04-02

## 2025-04-07 DIAGNOSIS — Z76.0 MEDICATION REFILL: ICD-10-CM

## 2025-04-07 RX ORDER — MIRABEGRON 50 MG/1
50 TABLET, FILM COATED, EXTENDED RELEASE ORAL DAILY
Qty: 30 TABLET | Refills: 0 | Status: SHIPPED | OUTPATIENT
Start: 2025-04-07

## 2025-04-07 NOTE — TELEPHONE ENCOUNTER
Pt needs the    MYRBETRIQ 50 MG TB24   Refilled and a prior authorization filled out. Would like it to be sent to the [facility] on [address]

## 2025-04-07 NOTE — TELEPHONE ENCOUNTER
Kevin E Thompson called to request a refill on his medication.      Last office visit : 1/27/2025   Next office visit : 7/14/2025     Requested Prescriptions     Pending Prescriptions Disp Refills    MYRBETRIQ 50 MG TB24 30 tablet 0     Sig: Take 50 mg by mouth daily            Brenda Medina MA

## 2025-05-01 DIAGNOSIS — I10 ESSENTIAL HYPERTENSION: ICD-10-CM

## 2025-05-01 DIAGNOSIS — F41.9 ANXIETY: ICD-10-CM

## 2025-05-01 RX ORDER — VALSARTAN 160 MG/1
160 TABLET ORAL DAILY
Qty: 90 TABLET | Refills: 0 | Status: SHIPPED | OUTPATIENT
Start: 2025-05-01

## 2025-05-01 RX ORDER — BUSPIRONE HYDROCHLORIDE 10 MG/1
TABLET ORAL
Qty: 90 TABLET | Refills: 0 | Status: SHIPPED | OUTPATIENT
Start: 2025-05-01

## 2025-05-19 ENCOUNTER — CLINICAL SUPPORT (OUTPATIENT)
Dept: PRIMARY CARE CLINIC | Age: 40
End: 2025-05-19
Payer: MEDICARE

## 2025-05-19 DIAGNOSIS — Z11.1 SCREENING FOR TUBERCULOSIS: Primary | ICD-10-CM

## 2025-05-19 NOTE — PROGRESS NOTES
PPD Placement note  Kevin Piper, 39 y.o. male is here today for placement of PPD test  Reason for PPD test: needed for Easter Seals   Pt taken PPD test before: yes  Verified in allergy area and with patient that they are not allergic to the products PPD is made of (Phenol or Tween). Yes  Is patient taking any oral or IV steroid medication now or have they taken it in the last month? no  Has the patient ever received the BCG vaccine?:   Has the patient been in recent contact with anyone known or suspected of having active TB disease?: no       Date of exposure (if applicable):  NA       Name of person they were exposed to (if applicable): NA  Patient's Country of origin?: [place]   O: Alert and oriented in NAD.  P:  PPD placed on 5/19/2025 on Right [facility].  Patient advised to return for reading within 48-72 hours.

## 2025-05-21 ENCOUNTER — CLINICAL SUPPORT (OUTPATIENT)
Dept: PRIMARY CARE CLINIC | Age: 40
End: 2025-05-21

## 2025-05-21 LAB
INDURATION: NORMAL
TB SKIN TEST: NEGATIVE

## 2025-05-21 PROCEDURE — 86580 TB INTRADERMAL TEST: CPT | Performed by: FAMILY MEDICINE

## 2025-05-21 NOTE — PROGRESS NOTES
PPD Reading Note  PPD read and results entered in [facility].5/21/25  Result: 0 mm induration.  Interpretation: neg    Allergic reaction: no

## 2025-05-29 DIAGNOSIS — F41.9 ANXIETY: ICD-10-CM

## 2025-05-30 DIAGNOSIS — F41.9 ANXIETY: ICD-10-CM

## 2025-05-30 NOTE — TELEPHONE ENCOUNTER
Kevin E Thompson called to request a refill on his medication.      Last office visit : 5/21/2025   Next office visit : 7/14//2025 Dr.Ethan Arzate    Requested Prescriptions     Pending Prescriptions Disp Refills    busPIRone (BUSPAR) 10 MG tablet 90 tablet 0     Sig: Take 1 tablet 3 times a day            Martha Perdomo LPN

## 2025-06-02 RX ORDER — BUSPIRONE HYDROCHLORIDE 10 MG/1
10 TABLET ORAL 3 TIMES DAILY
Qty: 90 TABLET | Refills: 0 | OUTPATIENT
Start: 2025-06-02

## 2025-06-02 RX ORDER — BUSPIRONE HYDROCHLORIDE 10 MG/1
TABLET ORAL
Qty: 90 TABLET | Refills: 0 | Status: SHIPPED | OUTPATIENT
Start: 2025-06-02

## 2025-06-02 NOTE — TELEPHONE ENCOUNTER
Kevin called requesting a refill of the below medication which has been pended for you:     Requested Prescriptions     Pending Prescriptions Disp Refills    busPIRone (BUSPAR) 10 MG tablet [Pharmacy Med Name: BUSPIRONE HYDROCHLORIDE 10MG TABLET] 90 tablet 0     Sig: TAKE 1 TABLET BY MOUTH THREE TIMES DAILY.       Last Appointment Date: 1/27/2025  Next Appointment Date: Visit date not found    Allergies   Allergen Reactions    Latex Swelling and Rash     Had reaction at dentist office-redness, swelling, and rash  Other reaction(s): rash  Had reaction at dentist office-redness, swelling, and rash    Ceftriaxone Other (See Comments)     Other reaction(s): Other (see comments)  Fever, hallucinations was transported via ambulance [facility]   Other reaction(s): high fever  Fever, hallucinations was transported via ambulance [facility]     Pravastatin      Myalgias    Vancomycin Other (See Comments)     Other reaction(s): Other (See Comments)  Red warm hands and itching  Red warm hands and itching

## 2025-06-12 DIAGNOSIS — R00.0 TACHYCARDIA: ICD-10-CM

## 2025-06-12 DIAGNOSIS — I10 ESSENTIAL HYPERTENSION: ICD-10-CM

## 2025-06-12 DIAGNOSIS — F41.9 ANXIETY: ICD-10-CM

## 2025-06-13 RX ORDER — PROPRANOLOL HCL 20 MG
20 TABLET ORAL DAILY
Qty: 30 TABLET | Refills: 0 | Status: SHIPPED | OUTPATIENT
Start: 2025-06-13

## 2025-06-13 NOTE — TELEPHONE ENCOUNTER
Kevin E Thompson called to request a refill on his medication.      Last office visit : 5/21/2025 (RONIT Martinez)  Next office visit : 7/14/2025 (NTP appointment Walt Arzate MD)    Requested Prescriptions     Pending Prescriptions Disp Refills    propranolol (INDERAL) 20 MG tablet 90 tablet 0     Sig: Take 1 tablet by mouth daily            Michelle Bal LPN

## 2025-06-23 NOTE — TELEPHONE ENCOUNTER
Kevin E Thompson called to request a refill on his medication.      Last office visit : 5/21/2025   Next office visit : Visit date not found     Requested Prescriptions     Pending Prescriptions Disp Refills    Omega-3 Fatty Acids (THE VERY FINEST FISH OIL) LIQD [Pharmacy Med Name: THE VERY FINEST FISH OIL 500MG/5ML-800MG/5ML-1600MG/5ML-10UNIT/5ML LIQUID] 200 mL 0     Sig: TAKE 3 ML BY MOUTH DAILY            Donna Andrea MA

## 2025-06-24 RX ORDER — OMEGA-3/DHA/EPA/FISH OIL 1600MG/5ML
LIQUID (ML) ORAL
Qty: 200 ML | Refills: 0 | Status: SHIPPED | OUTPATIENT
Start: 2025-06-24

## 2025-07-01 DIAGNOSIS — F41.9 ANXIETY: ICD-10-CM

## 2025-07-01 RX ORDER — BUSPIRONE HYDROCHLORIDE 10 MG/1
TABLET ORAL
Qty: 90 TABLET | Refills: 0 | Status: SHIPPED | OUTPATIENT
Start: 2025-07-01

## 2025-07-11 SDOH — HEALTH STABILITY: PHYSICAL HEALTH: ON AVERAGE, HOW MANY MINUTES DO YOU ENGAGE IN EXERCISE AT THIS LEVEL?: PATIENT DECLINED

## 2025-07-14 ENCOUNTER — OFFICE VISIT (OUTPATIENT)
Dept: PRIMARY CARE CLINIC | Age: 40
End: 2025-07-14

## 2025-07-14 VITALS
HEART RATE: 94 BPM | HEIGHT: 60 IN | DIASTOLIC BLOOD PRESSURE: 80 MMHG | RESPIRATION RATE: 18 BRPM | WEIGHT: 76 LBS | SYSTOLIC BLOOD PRESSURE: 130 MMHG | BODY MASS INDEX: 14.92 KG/M2 | OXYGEN SATURATION: 99 % | TEMPERATURE: 98.7 F

## 2025-07-14 DIAGNOSIS — F41.9 ANXIETY: ICD-10-CM

## 2025-07-14 DIAGNOSIS — I10 ESSENTIAL HYPERTENSION: ICD-10-CM

## 2025-07-14 DIAGNOSIS — R00.0 TACHYCARDIA: ICD-10-CM

## 2025-07-14 DIAGNOSIS — Z76.0 MEDICATION REFILL: ICD-10-CM

## 2025-07-14 DIAGNOSIS — Z76.89 ENCOUNTER TO ESTABLISH CARE: Primary | ICD-10-CM

## 2025-07-14 RX ORDER — VALSARTAN 160 MG/1
160 TABLET ORAL DAILY
Qty: 90 TABLET | Refills: 1 | Status: SHIPPED | OUTPATIENT
Start: 2025-07-14

## 2025-07-14 RX ORDER — MIRABEGRON 50 MG/1
50 TABLET, FILM COATED, EXTENDED RELEASE ORAL DAILY
Qty: 90 TABLET | Refills: 1 | Status: SHIPPED | OUTPATIENT
Start: 2025-07-14

## 2025-07-14 RX ORDER — PROPRANOLOL HCL 20 MG
20 TABLET ORAL DAILY
Qty: 90 TABLET | Refills: 1 | Status: SHIPPED | OUTPATIENT
Start: 2025-07-14

## 2025-07-14 SDOH — ECONOMIC STABILITY: FOOD INSECURITY: WITHIN THE PAST 12 MONTHS, THE FOOD YOU BOUGHT JUST DIDN'T LAST AND YOU DIDN'T HAVE MONEY TO GET MORE.: NEVER TRUE

## 2025-07-14 SDOH — ECONOMIC STABILITY: FOOD INSECURITY: WITHIN THE PAST 12 MONTHS, YOU WORRIED THAT YOUR FOOD WOULD RUN OUT BEFORE YOU GOT MONEY TO BUY MORE.: NEVER TRUE

## 2025-07-14 ASSESSMENT — ENCOUNTER SYMPTOMS
ABDOMINAL PAIN: 0
BLOOD IN STOOL: 0
WHEEZING: 0
CHEST TIGHTNESS: 0
SHORTNESS OF BREATH: 0

## 2025-07-14 ASSESSMENT — PATIENT HEALTH QUESTIONNAIRE - PHQ9
SUM OF ALL RESPONSES TO PHQ QUESTIONS 1-9: 0
1. LITTLE INTEREST OR PLEASURE IN DOING THINGS: NOT AT ALL
SUM OF ALL RESPONSES TO PHQ QUESTIONS 1-9: 0
2. FEELING DOWN, DEPRESSED OR HOPELESS: NOT AT ALL

## 2025-07-14 NOTE — PROGRESS NOTES
tablet Take 1 tablet by mouth daily       No current facility-administered medications for this visit.      Family History   Problem Relation Age of Onset    Colon Cancer Mother 55        In remission    High Cholesterol Mother     High Blood Pressure Mother     Thyroid Disease Mother         Hypothyroidism    Coronary Art Dis Maternal Grandmother         CABG x 2    Heart Attack Maternal Grandmother     High Blood Pressure Maternal Grandmother     Cancer Maternal Grandmother         Pancreatic Cancer    Heart Failure Maternal Grandfather          in     Immunodeficiency Neg Hx       Past Medical History:   Diagnosis Date    Anxiety     Chronic kidney disease     kidney stones started at age 9, treated at [facility].  Has had lithotripsy and surgery to remove stones    Club Foot     Congenital paraplegia (HCC)     Hypertension     diagnosed at age 11    Kidney stones 2011    Leg fracture, left     while in body cast due to swelling    Myelomeningocele with hydrocephalus (HCC) 1985    Open spina bifida at birth    Neuropathy     Open knee wound, left, subsequent encounter 2022    Pressure sore on ankle     and knee     Recurrent UTI     Scoliosis     Spastic neurogenic bladder     cather x 5 a day     Ulcer of penis 10/31/2016     (ventriculoperitoneal) shunt status       Past Surgical History:   Procedure Laterality Date    HERNIA REPAIR      groin    HIP SURGERY Left     LITHOTRIPSY      VENTRICULOPERITONEAL SHUNT      with revisions       Allergies   Allergen Reactions    Latex Swelling and Rash     Had reaction at dentist office-redness, swelling, and rash  Other reaction(s): rash  Had reaction at dentist office-redness, swelling, and rash    Ceftriaxone Other (See Comments)     Other reaction(s): Other (see comments)  Fever, hallucinations was transported via ambulance [facility]   Other reaction(s): high fever  [facility]

## 2025-07-28 DIAGNOSIS — F41.9 ANXIETY: ICD-10-CM

## 2025-07-28 RX ORDER — BUSPIRONE HYDROCHLORIDE 10 MG/1
10 TABLET ORAL 3 TIMES DAILY
Qty: 90 TABLET | Refills: 3 | Status: SHIPPED | OUTPATIENT
Start: 2025-07-28